# Patient Record
Sex: FEMALE | Race: WHITE | Employment: UNEMPLOYED | URBAN - METROPOLITAN AREA
[De-identification: names, ages, dates, MRNs, and addresses within clinical notes are randomized per-mention and may not be internally consistent; named-entity substitution may affect disease eponyms.]

---

## 2016-10-18 LAB — ANTIBODY: NEGATIVE

## 2018-03-12 ENCOUNTER — OFFICE VISIT (OUTPATIENT)
Dept: OBGYN CLINIC | Age: 48
End: 2018-03-12
Payer: COMMERCIAL

## 2018-03-12 VITALS
SYSTOLIC BLOOD PRESSURE: 120 MMHG | BODY MASS INDEX: 23.66 KG/M2 | WEIGHT: 142 LBS | HEIGHT: 65 IN | DIASTOLIC BLOOD PRESSURE: 74 MMHG

## 2018-03-12 DIAGNOSIS — Z12.39 SCREENING FOR BREAST CANCER: ICD-10-CM

## 2018-03-12 DIAGNOSIS — Z01.419 VISIT FOR GYNECOLOGIC EXAMINATION: ICD-10-CM

## 2018-03-12 DIAGNOSIS — Z01.419 VISIT FOR GYNECOLOGIC EXAMINATION: Primary | ICD-10-CM

## 2018-03-12 LAB
ESTRADIOL LEVEL: 85 PG/ML
FOLLICLE STIMULATING HORMONE: 12.5 MIU/ML
T4 FREE: 1.16 NG/DL (ref 0.93–1.7)
TSH SERPL DL<=0.05 MIU/L-ACNC: 0.13 UIU/ML (ref 0.27–4.2)

## 2018-03-12 PROCEDURE — 99396 PREV VISIT EST AGE 40-64: CPT | Performed by: OBSTETRICS & GYNECOLOGY

## 2018-03-12 RX ORDER — ALPRAZOLAM 0.25 MG/1
TABLET ORAL
Refills: 0 | COMMUNITY
Start: 2018-01-20 | End: 2019-04-12 | Stop reason: ALTCHOICE

## 2018-03-12 RX ORDER — AMLODIPINE BESYLATE 5 MG/1
TABLET ORAL
Refills: 5 | COMMUNITY
Start: 2018-02-12 | End: 2018-11-05 | Stop reason: SDUPTHER

## 2018-03-12 ASSESSMENT — PATIENT HEALTH QUESTIONNAIRE - PHQ9
SUM OF ALL RESPONSES TO PHQ9 QUESTIONS 1 & 2: 0
2. FEELING DOWN, DEPRESSED OR HOPELESS: 0
1. LITTLE INTEREST OR PLEASURE IN DOING THINGS: 0
SUM OF ALL RESPONSES TO PHQ QUESTIONS 1-9: 0

## 2018-03-12 ASSESSMENT — ENCOUNTER SYMPTOMS
ABDOMINAL PAIN: 0
BLOOD IN STOOL: 0
SORE THROAT: 0
SHORTNESS OF BREATH: 0
VOMITING: 0
DIARRHEA: 0
CONSTIPATION: 0
ABDOMINAL DISTENTION: 0
NAUSEA: 0
COUGH: 0
WHEEZING: 0

## 2018-03-12 NOTE — PROGRESS NOTES
Subjective:      Isidro Bishop is a 46 y.o. female  here for routine exam.  Current Complaints: normal menses, no abnormal bleeding, pelvic pain or discharge, no breast pain or new or enlarging lumps on self exam, she complains of some recent bleeding irregularities however nothing consistent. Menstrual history:   regular menses 30 days  Sexual activity:  yes, denies knowledge of risky exposure  Abnormal vaginal discharge:  No  Contraceptive method:  tubal ligation    Vitals:  /74 (Site: Right Arm, Position: Sitting, Cuff Size: Large Adult)   Ht 5' 5\" (1.651 m)   Wt 142 lb (64.4 kg)   LMP 2018 (Approximate)   BMI 23.63 kg/m²   Allergies:  Amoxicillin; Nsaids; and Pcn [penicillins]  Past Medical History:   Diagnosis Date    Stroke ([facility] [address] 2016    Thyroid disease      Past Surgical History:   Procedure Laterality Date    BLADDER SURGERY      DILATION AND CURETTAGE      ENDOMETRIAL ABLATION      TONSILLECTOMY       History reviewed. No pertinent family history. Social History     Social History    Marital status:      Spouse name: N/A    Number of children: N/A    Years of education: N/A     Occupational History    Not on file. Social History Main Topics    Smoking status: Former Smoker     Types: Cigarettes    Smokeless tobacco: Never Used    Alcohol use Yes      Comment: ocassionally    Drug use: No    Sexual activity: Not Currently     Other Topics Concern    Not on file     Social History Narrative    No narrative on file       Gynecologic History  Patient's last menstrual period was 2018 (approximate). Last Pap:  Results: normal  Last Mammogram: Yes Results: normal  no fmhx cancer  OB History      Para Term  AB Living    2 2       2    SAB TAB Ectopic Molar Multiple Live Births                       Patient's medications, allergies, past medical, surgical, social and family histories were reviewed and updated as appropriate. Review of Systems  Review of Systems   Constitutional: Negative for activity change, appetite change, fatigue and unexpected weight change. HENT: Negative for nosebleeds and sore throat. Eyes: Negative for visual disturbance. Respiratory: Negative for cough, shortness of breath and wheezing. Cardiovascular: Negative for chest pain, palpitations and leg swelling. Gastrointestinal: Negative for abdominal distention, abdominal pain, blood in stool, constipation, diarrhea, nausea and vomiting. Endocrine: Negative for cold intolerance, heat intolerance, polydipsia and polyuria. Genitourinary: Negative for difficulty urinating, dyspareunia, dysuria, frequency, genital sores, hematuria, menstrual problem, pelvic pain, urgency, vaginal bleeding, vaginal discharge and vaginal pain. Recent slight irregularity with prolonged cycle interval   Musculoskeletal: Negative for arthralgias. Skin: Negative for rash. Neurological: Negative for dizziness, weakness, light-headedness and headaches. Hematological: Negative for adenopathy. Does not bruise/bleed easily. Psychiatric/Behavioral: Negative for confusion and sleep disturbance. Objective:     Vitals:  /74 (Site: Right Arm, Position: Sitting, Cuff Size: Large Adult)   Ht 5' 5\" (1.651 m)   Wt 142 lb (64.4 kg)   LMP 01/16/2018 (Approximate)   BMI 23.63 kg/m²     Physical Exam   Constitutional: She is oriented to person, place, and time. She appears well-developed and well-nourished. No distress. HENT:   Head: Normocephalic. Right Ear: External ear normal.   Left Ear: External ear normal.   Nose: Nose normal.   Eyes: Conjunctivae and EOM are normal. Pupils are equal, round, and reactive to light. Neck: No tracheal deviation present. No thyromegaly present. Cardiovascular: Normal rate, regular rhythm, normal heart sounds and intact distal pulses. Exam reveals no gallop and no friction rub. No murmur heard.   Pulmonary/Chest: Effort normal and breath sounds normal. No respiratory distress. She has no wheezes. She has no rales. She exhibits no tenderness. Right breast exhibits no inverted nipple, no mass, no nipple discharge, no skin change and no tenderness. Left breast exhibits no inverted nipple, no mass, no nipple discharge, no skin change and no tenderness. Abdominal: Soft. Bowel sounds are normal. She exhibits no distension and no mass. There is no tenderness. There is no rebound and no guarding. Genitourinary: Vagina normal and uterus normal. No breast swelling, tenderness, discharge or bleeding. There is no rash, tenderness or lesion on the right labia. There is no rash, tenderness or lesion on the left labia. Uterus is not deviated, not enlarged, not fixed and not tender. Cervix exhibits no motion tenderness, no discharge and no friability. Right adnexum displays no mass, no tenderness and no fullness. Left adnexum displays no mass, no tenderness and no fullness. No erythema, tenderness or bleeding in the vagina. No vaginal discharge found. Genitourinary Comments: Uterus is not prolapsed and there is not a cystocele or rectocele noted   Musculoskeletal: She exhibits no edema. Lymphadenopathy:        Right: No inguinal adenopathy present. Left: No inguinal adenopathy present. Neurological: She is alert and oriented to person, place, and time. She displays normal reflexes. No cranial nerve deficit. Skin: Skin is warm and dry. She is not diaphoretic. Psychiatric: She has a normal mood and affect. Her behavior is normal. Judgment normal.       Assessment:     1. Visit for gynecologic examination  Candida DNA probe    Bacterial Vaginosis Panel    PAP SMEAR    Follicle Stimulating Hormone    Estradiol    TSH without Reflex    T4, Free   2. Screening for breast cancer  [facility] DIGITAL SCREEN W OR WO CAD BILATERAL       Body mass index is 23.63 kg/m².   Obesity:  Normal weight        Plan:   Pap smear : indicated:

## 2018-03-14 LAB — PAP SMEAR: NORMAL

## 2018-05-26 ENCOUNTER — HOSPITAL ENCOUNTER (OUTPATIENT)
Dept: WOMENS IMAGING | Age: 48
Discharge: HOME | End: 2018-05-28
Payer: COMMERCIAL

## 2018-05-26 DIAGNOSIS — Z12.39 SCREENING FOR BREAST CANCER: ICD-10-CM

## 2018-05-26 PROCEDURE — 77067 SCR MAMMO BI INCL CAD: CPT

## 2018-07-05 ENCOUNTER — TELEPHONE (OUTPATIENT)
Dept: FAMILY MEDICINE CLINIC | Age: 48
End: 2018-07-05

## 2018-07-05 ENCOUNTER — OFFICE VISIT (OUTPATIENT)
Dept: FAMILY MEDICINE CLINIC | Age: 48
End: 2018-07-05
Payer: COMMERCIAL

## 2018-07-05 VITALS
HEIGHT: 65 IN | RESPIRATION RATE: 18 BRPM | OXYGEN SATURATION: 97 % | SYSTOLIC BLOOD PRESSURE: 124 MMHG | BODY MASS INDEX: 24.16 KG/M2 | TEMPERATURE: 98.4 F | WEIGHT: 145 LBS | HEART RATE: 78 BPM | DIASTOLIC BLOOD PRESSURE: 74 MMHG

## 2018-07-05 DIAGNOSIS — I10 ESSENTIAL HYPERTENSION: ICD-10-CM

## 2018-07-05 DIAGNOSIS — F32.A DEPRESSION, UNSPECIFIED DEPRESSION TYPE: Primary | ICD-10-CM

## 2018-07-05 DIAGNOSIS — F32.A DEPRESSION, UNSPECIFIED DEPRESSION TYPE: ICD-10-CM

## 2018-07-05 DIAGNOSIS — Z86.73 HISTORY OF STROKE: ICD-10-CM

## 2018-07-05 DIAGNOSIS — E03.9 HYPOTHYROIDISM, UNSPECIFIED TYPE: ICD-10-CM

## 2018-07-05 LAB
ALBUMIN SERPL-MCNC: 4.9 G/DL (ref 3.9–4.9)
ALP BLD-CCNC: 60 U/L (ref 40–130)
ALT SERPL-CCNC: 19 U/L (ref 0–33)
ANION GAP SERPL CALCULATED.3IONS-SCNC: 15 MEQ/L (ref 7–13)
AST SERPL-CCNC: 20 U/L (ref 0–35)
BASOPHILS ABSOLUTE: 0.1 K/UL (ref 0–0.2)
BASOPHILS RELATIVE PERCENT: 0.6 %
BILIRUB SERPL-MCNC: <0.2 MG/DL (ref 0–1.2)
BUN BLDV-MCNC: 14 MG/DL (ref 6–20)
CALCIUM SERPL-MCNC: 10.3 MG/DL (ref 8.6–10.2)
CHLORIDE BLD-SCNC: 99 MEQ/L (ref 98–107)
CO2: 27 MEQ/L (ref 22–29)
CREAT SERPL-MCNC: 0.73 MG/DL (ref 0.5–0.9)
EOSINOPHILS ABSOLUTE: 0 K/UL (ref 0–0.7)
EOSINOPHILS RELATIVE PERCENT: 0.5 %
GFR AFRICAN AMERICAN: >60
GFR NON-AFRICAN AMERICAN: >60
GLOBULIN: 2.9 G/DL (ref 2.3–3.5)
GLUCOSE BLD-MCNC: 93 MG/DL (ref 74–109)
HCT VFR BLD CALC: 43.3 % (ref 37–47)
HEMOGLOBIN: 14.9 G/DL (ref 12–16)
LYMPHOCYTES ABSOLUTE: 2.1 K/UL (ref 1–4.8)
LYMPHOCYTES RELATIVE PERCENT: 24 %
MCH RBC QN AUTO: 32.2 PG (ref 27–31.3)
MCHC RBC AUTO-ENTMCNC: 34.4 % (ref 33–37)
MCV RBC AUTO: 93.8 FL (ref 82–100)
MONOCYTES ABSOLUTE: 0.5 K/UL (ref 0.2–0.8)
MONOCYTES RELATIVE PERCENT: 6.1 %
NEUTROPHILS ABSOLUTE: 5.9 K/UL (ref 1.4–6.5)
NEUTROPHILS RELATIVE PERCENT: 68.8 %
PDW BLD-RTO: 12.8 % (ref 11.5–14.5)
PLATELET # BLD: 268 K/UL (ref 130–400)
POTASSIUM SERPL-SCNC: 4.4 MEQ/L (ref 3.5–5.1)
RBC # BLD: 4.62 M/UL (ref 4.2–5.4)
SODIUM BLD-SCNC: 141 MEQ/L (ref 132–144)
T4 FREE: 1.42 NG/DL (ref 0.93–1.7)
TOTAL PROTEIN: 7.8 G/DL (ref 6.4–8.1)
TSH REFLEX: 1.28 UIU/ML (ref 0.27–4.2)
WBC # BLD: 8.6 K/UL (ref 4.8–10.8)

## 2018-07-05 PROCEDURE — 99204 OFFICE O/P NEW MOD 45 MIN: CPT | Performed by: INTERNAL MEDICINE

## 2018-07-05 RX ORDER — BUSPIRONE HYDROCHLORIDE 7.5 MG/1
7.5 TABLET ORAL 2 TIMES DAILY
Qty: 60 TABLET | Refills: 0 | Status: SHIPPED | OUTPATIENT
Start: 2018-07-05 | End: 2018-10-15

## 2018-07-05 RX ORDER — UBIDECARENONE 75 MG
1 CAPSULE ORAL
COMMUNITY
End: 2019-06-05 | Stop reason: CLARIF

## 2018-07-05 RX ORDER — ATORVASTATIN CALCIUM 40 MG/1
40 TABLET, FILM COATED ORAL DAILY
Qty: 90 TABLET | Refills: 0 | Status: SHIPPED | OUTPATIENT
Start: 2018-07-05 | End: 2018-08-07 | Stop reason: DRUGHIGH

## 2018-07-05 RX ORDER — LEVOTHYROXINE SODIUM 0.1 MG/1
100 TABLET ORAL
COMMUNITY
Start: 2018-03-29 | End: 2018-09-25

## 2018-07-05 ASSESSMENT — ENCOUNTER SYMPTOMS
ABDOMINAL PAIN: 0
BACK PAIN: 0
EYE PAIN: 0
SHORTNESS OF BREATH: 0

## 2018-07-05 NOTE — TELEPHONE ENCOUNTER
Patient states she was see today and there was a cholesterol  Script sent to the pharmacy but was not discussed at appt.  She wants to know why it was sent to the pharmacy

## 2018-07-05 NOTE — PROGRESS NOTES
Subjective:      Patient ID: Carlos Puga is a 46 y.o. female who presents today with:  Chief Complaint   Patient presents with    Depression     went off meds and feels that she is ready to start back on them    Menstrual Problem     has not had a cycle in 3months; has been for a check up with [facility]       HPI    Depression-Chronic issue. Associated with Anxiety. She has been on multiple SSRI' in the past. Wellbutrin made her more anxious. She has been off of it     Menstrual problems-She believes is related to her lola-menopausal symptoms. Hypertension-Chronic, essential in nature, improved with amlodipine. Past Medical History:   Diagnosis Date    Depression     Stroke ([facility] [address] 05/2016    Idiopathic she is still in process     Thyroid disease      Past Surgical History:   Procedure Laterality Date    BLADDER SURGERY      DILATION AND CURETTAGE      ENDOMETRIAL ABLATION      TONSILLECTOMY       Social History     Social History    Marital status:      Spouse name: N/A    Number of children: N/A    Years of education: N/A     Occupational History    Not on file. Social History Main Topics    Smoking status: Former Smoker     Types: Cigarettes     Start date: 7/5/1988     Quit date: 7/5/1990    Smokeless tobacco: Never Used    Alcohol use Yes      Comment: ocassionally    Drug use: No    Sexual activity: Not Currently     Other Topics Concern    Not on file     Social History Narrative    No narrative on file     Allergies   Allergen Reactions    Amoxicillin Hives    Nsaids Other (See Comments)     STROKE/NO NSAIDS ALLOWED    Pcn [Penicillins] Rash     Current Outpatient Prescriptions on File Prior to Visit   Medication Sig Dispense Refill    ALPRAZolam (XANAX) 0.25 MG tablet TAKE 1 TABLET BY MOUTH AS NEEDED for up to 90 days.   0    amLODIPine (NORVASC) 5 MG tablet TAKE 1 TABLET BY MOUTH ONCE DAILY  5    Multiple Vitamin (MVI, CELEBRATE, CHEWABLE TABLET) Take 1 tablet by mouth      aspirin 81 MG tablet Take 81 mg by mouth 2 times daily      Glucosamine-Chondroit-Vit C-Mn (GLUCOSAMINE 1500 COMPLEX PO) Take 1 tablet by mouth daily      fluticasone (FLONASE) 50 MCG/ACT nasal spray 2 sprays       No current facility-administered medications on file prior to visit. History reviewed. No pertinent family history. I have personally reviewed the ROS, PMH, PFH, and social history     Review of Systems   Constitutional: Negative for chills and fever. HENT: Negative for congestion. Eyes: Negative for pain. Respiratory: Negative for shortness of breath. Cardiovascular: Negative for chest pain. Gastrointestinal: Negative for abdominal pain. Genitourinary: Negative for hematuria. Musculoskeletal: Negative for back pain. Allergic/Immunologic: Negative for immunocompromised state. Neurological: Negative for headaches. Psychiatric/Behavioral: Negative for hallucinations. +depression  +Anxiety        Objective:   /74 (Site: Left Arm, Position: Sitting, Cuff Size: Large Adult)   Pulse 78   Temp 98.4 °F (36.9 °C) (Tympanic)   Resp 18   Ht 5' 4.76\" (1.645 m)   Wt 145 lb (65.8 kg)   LMP 04/05/2018 (Approximate) Comment: Has been 3months since last cycle  SpO2 97%   BMI 24.31 kg/m²     Physical Exam   Constitutional: She is oriented to person, place, and time. She appears well-developed and well-nourished. HENT:   Head: Normocephalic. Eyes: Pupils are equal, round, and reactive to light. Neck: No tracheal deviation present. Cardiovascular: Normal rate, regular rhythm and normal heart sounds. Exam reveals no gallop and no friction rub. No murmur heard. Pulmonary/Chest: No respiratory distress. Abdominal: Soft. Bowel sounds are normal. She exhibits no distension. There is no rebound. Musculoskeletal: She exhibits no edema. Neurological: She is oriented to person, place, and time. Skin: Skin is warm and dry.

## 2018-07-09 ENCOUNTER — TELEPHONE (OUTPATIENT)
Dept: FAMILY MEDICINE CLINIC | Age: 48
End: 2018-07-09

## 2018-07-09 NOTE — TELEPHONE ENCOUNTER
Please restart with just zoloft. The combination of the two may have been too much. If still having diarrhea I will switch.

## 2018-08-06 ENCOUNTER — OFFICE VISIT (OUTPATIENT)
Dept: FAMILY MEDICINE CLINIC | Age: 48
End: 2018-08-06
Payer: COMMERCIAL

## 2018-08-06 VITALS
TEMPERATURE: 98.4 F | SYSTOLIC BLOOD PRESSURE: 110 MMHG | OXYGEN SATURATION: 98 % | RESPIRATION RATE: 16 BRPM | HEART RATE: 82 BPM | WEIGHT: 144 LBS | BODY MASS INDEX: 23.99 KG/M2 | HEIGHT: 65 IN | DIASTOLIC BLOOD PRESSURE: 62 MMHG

## 2018-08-06 DIAGNOSIS — L91.8 SKIN TAG: ICD-10-CM

## 2018-08-06 DIAGNOSIS — Z86.73 HISTORY OF STROKE: ICD-10-CM

## 2018-08-06 DIAGNOSIS — F32.A DEPRESSION, UNSPECIFIED DEPRESSION TYPE: Primary | ICD-10-CM

## 2018-08-06 DIAGNOSIS — I10 ESSENTIAL HYPERTENSION: ICD-10-CM

## 2018-08-06 DIAGNOSIS — F41.9 ANXIETY: ICD-10-CM

## 2018-08-06 LAB
CHOLESTEROL, TOTAL: 244 MG/DL (ref 0–199)
HDLC SERPL-MCNC: 108 MG/DL (ref 40–59)
LDL CHOLESTEROL CALCULATED: 91 MG/DL (ref 0–129)
TRIGL SERPL-MCNC: 225 MG/DL (ref 0–200)

## 2018-08-06 PROCEDURE — 99214 OFFICE O/P EST MOD 30 MIN: CPT | Performed by: INTERNAL MEDICINE

## 2018-08-06 ASSESSMENT — ENCOUNTER SYMPTOMS
EYE PAIN: 0
SHORTNESS OF BREATH: 0
BACK PAIN: 0
ABDOMINAL PAIN: 0

## 2018-08-06 NOTE — PATIENT INSTRUCTIONS
Patient Education        Depression Treatment: Care Instructions  Your Care Instructions    Depression is a condition that affects the way you feel, think, and act. It causes symptoms such as low energy, loss of interest in daily activities, and sadness or grouchiness that goes on for a long time. Depression is very common and affects men and women of all ages. Depression is a medical illness caused by changes in the natural chemicals in your brain. It is not a character flaw, and it does not mean that you are a bad or weak person. It does not mean that you are going crazy. It is important to know that depression can be treated. Medicines, counseling, and self-care can all help. Many people do not get help because they are embarrassed or think that they will get over the depression on their own. But some people do not get better without treatment. Follow-up care is a key part of your treatment and safety. Be sure to make and go to all appointments, and call your doctor if you are having problems. It's also a good idea to know your test results and keep a list of the medicines you take. How can you care for yourself at home? Learn about antidepressant medicines  Antidepressant medicines can improve or end the symptoms of depression. You may need to take the medicine for at least 6 months, and often longer. Keep taking your medicine even if you feel better. If you stop taking it too soon, your symptoms may come back or get worse. You may start to feel better within 1 to 3 weeks of taking antidepressant medicine. But it can take as many as 6 to 8 weeks to see more improvement. Talk to your doctor if you have problems with your medicine or if you do not notice any improvement after 3 weeks. Antidepressants can make you feel tired, dizzy, or nervous. Some people have dry mouth, constipation, headaches, sexual problems, an upset stomach, or diarrhea.  Many of these side effects are mild and go away on their own \"I am hopeful\"; \"Things will get better\"; and \"I can ask for the help I need. \" Write down these statements and read them often, even if you don't believe them yet. · Be patient with yourself. It took time for your depression to develop, and it will take time for your symptoms to improve. Do not take on too much or be too hard on yourself. · Learn all you can about depression from written and online materials. · Check out behavioral health classes to learn more about dealing with depression. · Keep the numbers for these national suicide hotlines: 6-247-286-TALK (5-671.665.5855) and 2-228-ZATINOG (5-911.292.8851). If you or someone you know talks about suicide or feeling hopeless, get help right away. When should you call for help? Call 911 anytime you think you may need emergency care. For example, call if:    · You feel you cannot stop from hurting yourself or someone else.   [facility] your doctor now or seek immediate medical care if:    · You hear voices.     · You feel much more depressed.    Watch closely for changes in your health, and be sure to contact your doctor if:    · You are having problems with your depression medicine.     · You are not getting better as expected. Where can you learn more? Go to https://Coraidpebambieb.[employer]. org and sign in to your [employer] account. Enter E634 in the [employer] box to learn more about \"Depression Treatment: Care Instructions. \"     If you do not have an account, please click on the \"Sign Up Now\" link. Current as of: December 7, 2017  Content Version: 11.6  © 1247-4141 "[employer] Incorporated. Care instructions adapted under license by [facility] ([facility]). If you have questions about a medical condition or this instruction, always ask your healthcare professional. [address] any warranty or liability for your use of this information.

## 2018-08-07 DIAGNOSIS — Z86.73 HISTORY OF STROKE: Primary | ICD-10-CM

## 2018-08-07 RX ORDER — ATORVASTATIN CALCIUM 10 MG/1
10 TABLET, FILM COATED ORAL DAILY
Qty: 30 TABLET | Refills: 2 | Status: SHIPPED | OUTPATIENT
Start: 2018-08-07 | End: 2018-10-15

## 2018-08-13 ENCOUNTER — OFFICE VISIT (OUTPATIENT)
Dept: BEHAVIORAL/MENTAL HEALTH CLINIC | Age: 48
End: 2018-08-13
Payer: COMMERCIAL

## 2018-08-13 DIAGNOSIS — F33.1 MODERATE RECURRENT MAJOR DEPRESSION (HCC): Primary | ICD-10-CM

## 2018-08-13 PROCEDURE — 90791 PSYCH DIAGNOSTIC EVALUATION: CPT | Performed by: PSYCHOLOGIST

## 2018-08-13 ASSESSMENT — PATIENT HEALTH QUESTIONNAIRE - PHQ9
7. TROUBLE CONCENTRATING ON THINGS, SUCH AS READING THE NEWSPAPER OR WATCHING TELEVISION: 1
9. THOUGHTS THAT YOU WOULD BE BETTER OFF DEAD, OR OF HURTING YOURSELF: 0
5. POOR APPETITE OR OVEREATING: 2
SUM OF ALL RESPONSES TO PHQ9 QUESTIONS 1 & 2: 4
3. TROUBLE FALLING OR STAYING ASLEEP: 3
2. FEELING DOWN, DEPRESSED OR HOPELESS: 2
4. FEELING TIRED OR HAVING LITTLE ENERGY: 3
SUM OF ALL RESPONSES TO PHQ QUESTIONS 1-9: 14
6. FEELING BAD ABOUT YOURSELF - OR THAT YOU ARE A FAILURE OR HAVE LET YOURSELF OR YOUR FAMILY DOWN: 1
10. IF YOU CHECKED OFF ANY PROBLEMS, HOW DIFFICULT HAVE THESE PROBLEMS MADE IT FOR YOU TO DO YOUR WORK, TAKE CARE OF THINGS AT HOME, OR GET ALONG WITH OTHER PEOPLE: 1
SUM OF ALL RESPONSES TO PHQ QUESTIONS 1-9: 14
1. LITTLE INTEREST OR PLEASURE IN DOING THINGS: 2
8. MOVING OR SPEAKING SO SLOWLY THAT OTHER PEOPLE COULD HAVE NOTICED. OR THE OPPOSITE, BEING SO FIGETY OR RESTLESS THAT YOU HAVE BEEN MOVING AROUND A LOT MORE THAN USUAL: 0

## 2018-08-13 NOTE — PROGRESS NOTES
Behavioral Health Consultation  Betsey Sanchez, Ph.D., [facility]  Psychologist  8/13/18  11:27 AM      Time spent with Patient: 30 minutes  This is patient's first  [facility] appointment. Reason for Consult:  depression, anxiety, stress and relationship conflict  Referring Provider: Jayla Bone MD    Pt provided informed consent for the behavioral health program. Discussed with patient model of service to include the limits of confidentiality (i.e. abuse reporting, suicide intervention, etc.) and short-term intervention focused approach. Pt indicated understanding. Feedback given to PCP. S:   Pt reports a history of MH treatment though counseling  And has been on antidepressants most of her adult life through PCPs. Pt states she last had counseling at [facility] in [place] but left to do couples counseling and several providers have left the practice. Pt was just established with PCP and referred for [facility] . Pt notes she just started Zoloft and had terrible side effects, was switched to trintellix hoping she might have some relief. Since starting it she has had an improvement in the diarrhea but not relief. Pt lives alone and is in a relationship \"and that's a big reason I'm here\". Pt notes she has been dating someone for about 14 months and things were great at first. He started back on his antidepressant and experienced sexual side effects and \"ever since then the wall has gone up\". Pt's boyfriend has since stopped medication and hasn't restarted another antidepressant, had been taking steroids at that time. He is \"miserable\". \"honestly everything is telling me to end the relationship\" but doesn't know why she doesn't. Pt is from [place], has extended family  Close, has two daughters. 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 Pt  Used to exercise but had a stroke and other medical concerns \"so I backed off\". Pt notes fatigue.  Pt has good functional assessment, [place]-setting to identify pt's primary goals for [facility] visit / overall health, Supportive techniques, Provided Psychoeducation re: genesight testing, anxiety, depression and stress management, Explained relaxed breathing technique in detail and practiced this with pt in visit, Emphasized importance of regular practice of relaxation strategies to target / promote symptom relief and Provided pt list of websites and several smartphone helen resources for further practicing guided meditations and breathing exercises. Pt Behavioral Change Plan:    On Wednesday the Trintillex dose moves to 10mg on Friday add the Lipitor. Review the options with the Genesight testing    1. Dedicate 5 minutes 3x/day to practice the deep breathing    2. Review the list of apps and give some a try! [facility] Box, CBTi  and progressive muscle relaxation for sleep, etc.)    3. Read the below information about stress management    4. Return in about 2-3 weeks    Please note this report has been partially produced using speech recognition software  And may cause contain errors related to that system including grammar, punctuation and spelling as well as words and phrases that may seem inappropriate. If there are questions or concerns please feel free to contact me to clarify.

## 2018-08-13 NOTE — PATIENT INSTRUCTIONS
On Wednesday the Trintillex dose moves to 10mg on Friday add the Lipitor. Review the options with the Genesight testing    1. Dedicate 5 minutes 3x/day to practice the deep breathing    2. Review the list of apps and give some a try! [facility] Box, CBTi  and progressive muscle relaxation for sleep, etc.)    3. Read the below information about stress management    4. Return in about 2-3 weeks    \"The entire autonomic nervous system (and through it, our internal organs and glands) is largely driven by our breathing patterns. By changing our breathing we can influence millions of biochemical reactions in our body, producing more relaxing substances such as endorphins and fewer anxiety-producing ones like adrenaline and higher blood acidity. Mindfulness of the breath is so effective that it is common to all meditative and prayer traditions. \" Anxiety Fear & Breathing - Breathing. com    \"When overcoming high levels of anxiety, it is important to learn the techniques of correct breathing. Many people who live with high levels of anxiety are known to breathe through their chest. Shallow breathing through the chest means you are disrupting the balance of oxygen and carbon dioxide necessary to be in a relaxed state. This type of breathing will perpetuate the symptoms of anxiety. \" Yhat. com      Diaphragmatic Breathing             _____________________________________________________________________________  1. Sit in a comfortable position    2. Place one hand on your stomach and the other on your chest    3. Try to breathe so that only your stomach rises and falls    As you inhale, concentrate on your chest remaining relatively still while your stomach rises. It may be helpful for you to imagine that your pants are too big and you need to push your stomach out to hold them up. When exhaling, allow your stomach to fall in and the air to fully escape. Inhale slowly.   You may choose to hold the air breathing, muscle relaxation, and guided imagery/visualization to cope with stress, pain, anxiety and depression. I recommend to all my patients that they try different techniques to find the ones that work best for them. Below are 2 websites that have several breathing, relaxation, and visualization exercises that you can listen to and download for free.    NetworkZumba Fitnessair.tn. html  · Deep Breathing & Guided Relaxation Exercises (3)  · Guided Imagery/Visualization Exercises (5)  · Mindfulness & Meditation Exercises (3)  · Progressive Muscle Relaxation   · Soothing Instrumental Music (11)    http://[employer]/relax/  · Diaphragmatic Breathing   · Deep Breathing I   · Deep Breathing II   · Progressive Muscle Relaxation   · Guided Imagery: The SafetyCulture   · Guided Imagery: [facility]   · Relaxing Phrases   · Just This Breath   · Increasing Awareness   · Sending Thoughts Away on Clouds  · Sending Thoughts Away on Leaves  · Sorting Into Boxes     -----------------------------------------------------  Below are several apps that you can download to your smartphone to help with relaxation and mood coping. Ghbcpgq9Ijlol  Platform: StatAce  Cost: Free  Your breathing has a profound effect on your body. Dave Garcia know this fact to be true if youve ever taken deep breaths to calm yourself down when you were upset. That exercise can often make you feel more centered, and its proof that breathing is powerful. The Pyhxetz6Riypw helen uses guided breathing exercises to help reduce symptoms of an anxiety attack. If an attack is coming or the symptoms are unbearable, slip away into a quiet room, open your helen, and let the worry and stress slip away with each breath. Universal Breathing - Pranayama Free  Platform: StatAce  Cost: Free  Focused breathing exercises can help you regain composure during an anxiety attack.  They can also help you prevent an anxiety attack before videos that contain instructions for relaxing and clearing the mind. The videos are created by Dr. Maude Aguilar, a psychologist with more than 20 years of experience. In addition to viewing Dr. Fabian Gill videos, you can read a variety of articles related to anxiety, meditation, and stress management. Anxiety Free  Platform: Wagon   Cost: Free  Meditation requires mindfulness and a sense of presence in your thoughts. Hypnosis is one step beyond meditation. It works by sending signals to your brain and transforming it almost unknowingly. The creators of this helen say that its audio recordings contain subliminal signals that speak to the subconscious with powerful effect.  Hypnosis, like meditation, requires practice, and the goal is to get each user to a place where self-hypnosis is possible in order to reduce anxiety. Relax & Rest Guided Meditations  Platform: iPhone and Android  Cost: $0.99  While group meetings and discussions are always an option, some people find relaxation more easily on their own. This helen lets you relax in the space of your own home or office with three guided meditations. Breath Awareness Guided Meditation (5 minutes), Deep Rested Guided Meditation (13 minutes), and Whole Body Guided Relaxation (24 minutes) are designed specifically to help you relax and sink into a peaceful meditation moment. Equanimity - Meditation Timer & Tracker  Platform: Wagon   Cost: $4.99  Meditation is one way you can cope with the symptoms and side effects of anxiety. People who meditate can eventually train themselves to stay calm when feeling stressed or anxious. Equanimity - Meditation Timer & Tracker is simple and straightforward. Time each session and watch for visual light cues to let you know how long youve been meditating. Take notes about each session, and watch your progress as you learn to manage your stress and anxiety.   Virtual Hope Box  Platform: Wagon and Fannect  Cost: Mary Jane Escalera

## 2018-08-13 NOTE — Clinical Note
Pt would like to complete the 99dressesight testing if you would agree to order it. If so, can you please have the office reach out to her so she can come in for the swab?

## 2018-08-15 ENCOUNTER — TELEPHONE (OUTPATIENT)
Dept: FAMILY MEDICINE CLINIC | Age: 48
End: 2018-08-15

## 2018-08-15 NOTE — TELEPHONE ENCOUNTER
lmom x2 days to come in for genesight testing. No appt necessary, can come between 8:30-4:00 and have this done.

## 2018-08-20 ENCOUNTER — TELEPHONE (OUTPATIENT)
Dept: FAMILY MEDICINE CLINIC | Age: 48
End: 2018-08-20

## 2018-08-27 ENCOUNTER — TELEPHONE (OUTPATIENT)
Dept: FAMILY MEDICINE CLINIC | Age: 48
End: 2018-08-27

## 2018-08-27 NOTE — TELEPHONE ENCOUNTER
resubmitted pa for trintellix since pt has already tried 2 of the medications insurance wanted her to please advise pt when she calls back lmom

## 2018-08-31 NOTE — TELEPHONE ENCOUNTER
Left message for patient to call back.  I would suggest cymbalta, but I want to verify with her that she hasn't failed that one already

## 2018-09-05 NOTE — TELEPHONE ENCOUNTER
Patient called back to see status of PA for Trintellix. Has it been approved or are we still waiting?

## 2018-09-06 ENCOUNTER — TELEPHONE (OUTPATIENT)
Dept: FAMILY MEDICINE CLINIC | Age: 48
End: 2018-09-06

## 2018-09-06 NOTE — TELEPHONE ENCOUNTER
Patient is scheduled with you on 9/17/18 at 1030 for 15 minutes. It is a 6 wk f/u. Asking if you can remove skin tags during this appointment. Please advise.

## 2018-09-10 ENCOUNTER — TELEPHONE (OUTPATIENT)
Dept: FAMILY MEDICINE CLINIC | Age: 48
End: 2018-09-10

## 2018-09-17 ENCOUNTER — OFFICE VISIT (OUTPATIENT)
Dept: FAMILY MEDICINE CLINIC | Age: 48
End: 2018-09-17
Payer: COMMERCIAL

## 2018-09-17 VITALS
OXYGEN SATURATION: 96 % | HEART RATE: 76 BPM | HEIGHT: 65 IN | DIASTOLIC BLOOD PRESSURE: 60 MMHG | SYSTOLIC BLOOD PRESSURE: 114 MMHG | RESPIRATION RATE: 16 BRPM | BODY MASS INDEX: 23.96 KG/M2 | TEMPERATURE: 97.4 F

## 2018-09-17 DIAGNOSIS — E78.5 HYPERLIPIDEMIA, UNSPECIFIED HYPERLIPIDEMIA TYPE: ICD-10-CM

## 2018-09-17 DIAGNOSIS — F32.A DEPRESSION, UNSPECIFIED DEPRESSION TYPE: ICD-10-CM

## 2018-09-17 DIAGNOSIS — L98.9 SKIN LESION: ICD-10-CM

## 2018-09-17 DIAGNOSIS — L91.8 SKIN TAGS, MULTIPLE ACQUIRED: ICD-10-CM

## 2018-09-17 DIAGNOSIS — I10 ESSENTIAL HYPERTENSION: ICD-10-CM

## 2018-09-17 DIAGNOSIS — G47.09 OTHER INSOMNIA: ICD-10-CM

## 2018-09-17 DIAGNOSIS — L91.8 SKIN TAGS, MULTIPLE ACQUIRED: Primary | ICD-10-CM

## 2018-09-17 DIAGNOSIS — M21.619 BUNION: ICD-10-CM

## 2018-09-17 PROCEDURE — 99214 OFFICE O/P EST MOD 30 MIN: CPT | Performed by: INTERNAL MEDICINE

## 2018-09-17 PROCEDURE — 17110 DESTRUCTION B9 LES UP TO 14: CPT | Performed by: INTERNAL MEDICINE

## 2018-09-17 ASSESSMENT — ENCOUNTER SYMPTOMS
ABDOMINAL PAIN: 0
BACK PAIN: 0
EYE PAIN: 0
SHORTNESS OF BREATH: 0

## 2018-09-17 NOTE — PROGRESS NOTES
by mouth 2 times daily 60 tablet 0    ALPRAZolam (XANAX) 0.25 MG tablet TAKE 1 TABLET BY MOUTH AS NEEDED for up to 90 days. 0    amLODIPine (NORVASC) 5 MG tablet TAKE 1 TABLET BY MOUTH ONCE DAILY  5    Multiple Vitamin (MVI, CELEBRATE, CHEWABLE TABLET) Take 1 tablet by mouth      aspirin 81 MG tablet Take 81 mg by mouth 2 times daily      Glucosamine-Chondroit-Vit C-Mn (GLUCOSAMINE 1500 COMPLEX PO) Take 1 tablet by mouth daily      fluticasone (FLONASE) 50 MCG/ACT nasal spray 2 sprays      VORTIoxetine (TRINTELLIX) 5 MG tablet Take 1 tablet by mouth daily 14 tablet 0    sertraline (ZOLOFT) 50 MG tablet Take 1 tablet by mouth daily 30 tablet 1     No current facility-administered medications on file prior to visit. I have personally reviewed the ROS, PMH, PFH, and social history     Review of Systems   Constitutional: Negative for chills and fever. HENT: Negative for congestion. Eyes: Negative for pain. Respiratory: Negative for shortness of breath. Cardiovascular: Negative for chest pain. Gastrointestinal: Negative for abdominal pain. Genitourinary: Negative for hematuria. Musculoskeletal: Negative for back pain. Skin:        +skin tags  +lesion on left proximal thigh    Allergic/Immunologic: Negative for immunocompromised state. Neurological: Negative for headaches. Psychiatric/Behavioral: Negative for hallucinations. Objective:   /60 (Site: Left Upper Arm, Position: Sitting, Cuff Size: Large Adult)   Pulse 76   Temp 97.4 °F (36.3 °C) (Tympanic)   Resp 16   Ht 5' 5\" (1.651 m)   SpO2 96%   BMI 23.96 kg/m²     Physical Exam   Constitutional: She is oriented to person, place, and time. She appears well-developed and well-nourished. HENT:   Head: Normocephalic. Eyes: Pupils are equal, round, and reactive to light. Neck: No tracheal deviation present. Cardiovascular: Normal rate, regular rhythm and normal heart sounds.   Exam reveals no gallop and no friction rub. No murmur heard. Pulmonary/Chest: No respiratory distress. Abdominal: Soft. Bowel sounds are normal. She exhibits no distension. There is no rebound. Musculoskeletal: She exhibits no edema. Neurological: She is oriented to person, place, and time. Skin: Skin is warm and dry. Multiple flesh colored projections from axillae, back, and proximal thigh  Left whitish/flesh colored macule over left proximal thigh. Assessment:       Diagnosis Orders   1. Skin tags, multiple acquired  78742 - ID DESTRUCTION BENIGN LESIONS UP TO 14    Surgical Pathology    Surgical Pathology    Surgical Pathology    Surgical Pathology    Surgical Pathology   2. Depression, unspecified depression type     3. Essential hypertension     4. Other insomnia     5. Bunion  Referral To Podiatry - (AYESHA Barillas DPM   6. Skin lesion  Surgical Pathology    Surgical Pathology    Surgical Pathology    Surgical Pathology    Surgical Pathology   7. Hyperlipidemia, unspecified hyperlipidemia type  Lipid Panel         Plan:   R/B/A explained  Sites cleaned with alcohol and betadine  Sites anesthestized with 2% lidocaine with epi, time out taken prior to starting. Lesions removed with electrocautery. Same process followed for lesion on left proximal thigh but shave biopsy performed. Foreceps used to grab skin and 11 scalpel used to shave off section. Hemostasis achieved with electrocautery. Take 2 remfresh  Skin removal  Tj minor, she is not SI/HI  Continue Lipitor 10 mg once daily, repeat lipid panel in 3 months, likely increase to 20 mg once daily given history. Take 2 REM fresh, call if side effects occur. Referred to podiatry for suspected early bunions, referral to orthotics, etc.   Patient had one other lesion she wanted investigated for time purposes we will have to reschedule.      Orders Placed This Encounter   Procedures    Lipid Panel     Standing Status:   Future     Standing NO OR YES/SPECIMEN     Answer:   No    Referral To Podiatry - Vivian Galloway DPM ([facility])     Referral Priority:   Routine     Referral Type:   Eval and Treat     Referral Reason:   Specialty Services Required     Referred to Provider:   Charito Duarte DPM     Requested Specialty:   Podiatry     Number of Visits Requested:   1    89545 - [place] DESTRUCTION BENIGN LESIONS UP TO 14     No orders of the defined types were placed in this encounter. Return for regularly scheduled appointment with PCP, worsening symptoms, call ASAP for appointment.       Jayla Bone MD

## 2018-09-17 NOTE — PATIENT INSTRUCTIONS
Patient Education        Skin Tag Removal: Care Instructions  Your Care Instructions  Skin tags are small lumps of fleshy brown, tan, or pink skin. They are usually raised or hang from the skin on a small stalk. They often grow on the eyelids, neck, armpit, and groin. Skin tags are not moles and usually do not turn into cancer. You are more likely to get skin tags if you are overweight. They also tend to run in families. Skin tags may be removed if they bother you. Your doctor can remove an unwanted skin tag by simply cutting it off. However, new skin tags often form. Follow-up care is a key part of your treatment and safety. Be sure to make and go to all appointments, and call your doctor if you are having problems. It's also a good idea to know your test results and keep a list of the medicines you take. How can you care for yourself at home? · If clothing irritates a skin tag, cover it with a bandage to prevent rubbing and bleeding. · If you have a skin tag removed, clean the area with soap and water two times a day unless your doctor gives you different instructions. Don't use hydrogen peroxide or alcohol, which can slow healing. ¨ You may cover the wound with a thin layer of petroleum jelly, such as Vaseline, and a nonstick bandage. · Check all the skin on your body once a month for skin growths or other changes, such as color and feel of the skin. ¨  front of a full-length mirror. Look carefully at the front and back of your body. Then look at your right and left sides with your arms raised. [facility] your elbows and look carefully at your forearms, the back of your upper arms, and your palms. ¨ Look at your feet, the soles of your feet, and the spaces between your toes. ¨ Use a hand mirror to look at the back of your legs, the back of your neck, and your back, rear end (buttocks), and genital area. Part the hair on your head to look at your scalp.   · If you see a change in a skin growth, contact your doctor. Look for:  ¨ A mole that bleeds. ¨ A fast-growing mole. ¨ A scaly or crusted growth on the skin. ¨ A sore that will not heal.  When should you call for help? Call your doctor now or seek immediate medical care if:    · You have signs of infection such as:  ¨ Pain, warmth, or swelling in your skin. ¨ Red streaks near a wound in your skin. ¨ Pus coming from a wound in your skin. ¨ A fever.    Watch closely for changes in your health, and be sure to contact your doctor if:    · You have an area of normal skin that suddenly changes in shape, size, or how it looks.     · You do not get better as expected. Where can you learn more? Go to https://[employer]. org and sign in to your [employer] account. Enter [phone] in the [facility] box to learn more about \"Skin Tag Removal: Care Instructions. \"     If you do not have an account, please click on the \"Sign Up Now\" link. Current as of: October 5, 2017  Content Version: 11.7  © 5661-3673 [employer]. Care instructions adapted under license by [address] If you have questions about a medical condition or this instruction, always ask your healthcare professional. Prashanth Sumner any warranty or liability for your use of this information.

## 2018-09-24 ENCOUNTER — TELEPHONE (OUTPATIENT)
Dept: FAMILY MEDICINE CLINIC | Age: 48
End: 2018-09-24

## 2018-09-25 NOTE — TELEPHONE ENCOUNTER
Pt following up on her samples, she is asking if she can have an alternative for this medication, Viibryd as you noted below. Please advise.

## 2018-09-25 NOTE — TELEPHONE ENCOUNTER
We do not have samples. The drug rep has not contacted me back yet she was looking into her plan for me for coverage.

## 2018-09-27 ENCOUNTER — TELEPHONE (OUTPATIENT)
Dept: FAMILY MEDICINE CLINIC | Age: 48
End: 2018-09-27

## 2018-09-27 NOTE — TELEPHONE ENCOUNTER
Samples ready for pt. Patient assist paper work submitted and originals are ready for  also.  lmom informing pt

## 2018-10-15 ENCOUNTER — OFFICE VISIT (OUTPATIENT)
Dept: FAMILY MEDICINE CLINIC | Age: 48
End: 2018-10-15
Payer: COMMERCIAL

## 2018-10-15 ENCOUNTER — TELEPHONE (OUTPATIENT)
Dept: FAMILY MEDICINE CLINIC | Age: 48
End: 2018-10-15

## 2018-10-15 VITALS
HEART RATE: 61 BPM | SYSTOLIC BLOOD PRESSURE: 112 MMHG | TEMPERATURE: 98.2 F | DIASTOLIC BLOOD PRESSURE: 62 MMHG | OXYGEN SATURATION: 98 % | RESPIRATION RATE: 16 BRPM

## 2018-10-15 DIAGNOSIS — I63.9 CRYPTOGENIC STROKE (HCC): ICD-10-CM

## 2018-10-15 DIAGNOSIS — F32.A DEPRESSION, UNSPECIFIED DEPRESSION TYPE: ICD-10-CM

## 2018-10-15 DIAGNOSIS — Z12.11 SCREENING FOR COLON CANCER: ICD-10-CM

## 2018-10-15 DIAGNOSIS — E78.5 HYPERLIPIDEMIA, UNSPECIFIED HYPERLIPIDEMIA TYPE: Primary | ICD-10-CM

## 2018-10-15 DIAGNOSIS — Z23 ENCOUNTER FOR IMMUNIZATION: ICD-10-CM

## 2018-10-15 LAB
ALBUMIN SERPL-MCNC: 4.8 G/DL (ref 3.9–4.9)
ALP BLD-CCNC: 52 U/L (ref 40–130)
ALT SERPL-CCNC: 15 U/L (ref 0–33)
ANION GAP SERPL CALCULATED.3IONS-SCNC: 11 MEQ/L (ref 7–13)
AST SERPL-CCNC: 18 U/L (ref 0–35)
BILIRUB SERPL-MCNC: <0.2 MG/DL (ref 0–1.2)
BUN BLDV-MCNC: 20 MG/DL (ref 6–20)
CALCIUM SERPL-MCNC: 9.6 MG/DL (ref 8.6–10.2)
CHLORIDE BLD-SCNC: 101 MEQ/L (ref 98–107)
CO2: 28 MEQ/L (ref 22–29)
CREAT SERPL-MCNC: 0.58 MG/DL (ref 0.5–0.9)
GFR AFRICAN AMERICAN: >60
GFR NON-AFRICAN AMERICAN: >60
GLOBULIN: 2.3 G/DL (ref 2.3–3.5)
GLUCOSE BLD-MCNC: 81 MG/DL (ref 74–109)
POTASSIUM SERPL-SCNC: 4.1 MEQ/L (ref 3.5–5.1)
SODIUM BLD-SCNC: 140 MEQ/L (ref 132–144)
TOTAL PROTEIN: 7.1 G/DL (ref 6.4–8.1)

## 2018-10-15 PROCEDURE — 99214 OFFICE O/P EST MOD 30 MIN: CPT | Performed by: INTERNAL MEDICINE

## 2018-10-15 PROCEDURE — 90688 IIV4 VACCINE SPLT 0.5 ML IM: CPT | Performed by: INTERNAL MEDICINE

## 2018-10-15 PROCEDURE — 90471 IMMUNIZATION ADMIN: CPT | Performed by: INTERNAL MEDICINE

## 2018-10-15 RX ORDER — LEVOTHYROXINE SODIUM 0.1 MG/1
100 TABLET ORAL
COMMUNITY
Start: 2018-03-29 | End: 2018-11-05 | Stop reason: SDUPTHER

## 2018-10-15 RX ORDER — ATORVASTATIN CALCIUM 40 MG/1
TABLET, FILM COATED ORAL
Refills: 0 | COMMUNITY
Start: 2018-09-16 | End: 2019-04-12 | Stop reason: ALTCHOICE

## 2018-10-15 ASSESSMENT — ENCOUNTER SYMPTOMS
BACK PAIN: 0
ABDOMINAL PAIN: 0
SHORTNESS OF BREATH: 0
EYE PAIN: 0

## 2018-10-15 NOTE — PROGRESS NOTES
10 mg by mouth daily 30 tablet 1    vitamin B-12 (CYANOCOBALAMIN) 100 MCG tablet Take 1 tablet by mouth      amLODIPine (NORVASC) 5 MG tablet TAKE 1 TABLET BY MOUTH ONCE DAILY  5    Multiple Vitamin (MVI, CELEBRATE, CHEWABLE TABLET) Take 1 tablet by mouth      aspirin 81 MG tablet Take 81 mg by mouth 2 times daily      Glucosamine-Chondroit-Vit C-Mn (GLUCOSAMINE 1500 COMPLEX PO) Take 1 tablet by mouth daily      fluticasone (FLONASE) 50 MCG/ACT nasal spray 2 sprays      sertraline (ZOLOFT) 50 MG tablet Take 1 tablet by mouth daily 30 tablet 1    ALPRAZolam (XANAX) 0.25 MG tablet TAKE 1 TABLET BY MOUTH AS NEEDED for up to 90 days. 0     No current facility-administered medications on file prior to visit. I have personally reviewed the ROS, PMH, PFH, and social history     Review of Systems   Constitutional: Negative for chills and fever. HENT: Negative for congestion. Eyes: Negative for pain. Respiratory: Negative for shortness of breath. Cardiovascular: Negative for chest pain. Gastrointestinal: Negative for abdominal pain. Genitourinary: Negative for hematuria. Musculoskeletal: Negative for back pain. Allergic/Immunologic: Negative for immunocompromised state. Neurological: Negative for headaches. Psychiatric/Behavioral: Positive for sleep disturbance. Negative for hallucinations, self-injury and suicidal ideas. (depression)-well controlled. Objective:   /62 (Site: Left Upper Arm, Position: Sitting, Cuff Size: Large Adult)   Pulse 61   Temp 98.2 °F (36.8 °C) (Tympanic)   Resp 16   SpO2 98%     Physical Exam   Constitutional: She is oriented to person, place, and time. She appears well-developed and well-nourished. HENT:   Head: Normocephalic. Eyes: Pupils are equal, round, and reactive to light. Neck: No tracheal deviation present. Cardiovascular: Normal rate, regular rhythm and normal heart sounds. Exam reveals no gallop and no friction rub.

## 2018-10-15 NOTE — TELEPHONE ENCOUNTER
Test Ordered: Referral To Unknown External Hematology [LUA540]   Code: OXG889   ORD #: [id number]  [facility]ated Diagnosis: [facility] (I63.9 [ICD-10-CM])          Per Dr. Nuris Edwards,  are you wanting him to see patient for anticoag blood disorder? If no, then he is suggesting referral to cardio. Please advise.

## 2018-10-23 DIAGNOSIS — Z86.73 HISTORY OF STROKE: ICD-10-CM

## 2018-10-24 RX ORDER — ATORVASTATIN CALCIUM 40 MG/1
40 TABLET, FILM COATED ORAL DAILY
Qty: 90 TABLET | Refills: 1 | Status: SHIPPED | OUTPATIENT
Start: 2018-10-24 | End: 2018-11-05 | Stop reason: SDUPTHER

## 2018-11-02 ENCOUNTER — TELEPHONE (OUTPATIENT)
Dept: FAMILY MEDICINE CLINIC | Age: 48
End: 2018-11-02

## 2018-11-02 NOTE — TELEPHONE ENCOUNTER
Left a message for her to call back, scheduling will verify it's covered before they proceed. Then remainder of the costs would depend on her deductible, maxium out of pocket, etc. She would need to call [facility] for that. If she had to see one physician first a neurologist would be most helpful and helping her decide which tests/workup would be most helpful.

## 2018-11-05 DIAGNOSIS — Z86.73 HISTORY OF STROKE: ICD-10-CM

## 2018-11-05 RX ORDER — AMLODIPINE BESYLATE 5 MG/1
TABLET ORAL
Qty: 90 TABLET | Refills: 1 | Status: SHIPPED | OUTPATIENT
Start: 2018-11-05 | End: 2019-05-16 | Stop reason: SDUPTHER

## 2018-11-05 RX ORDER — ATORVASTATIN CALCIUM 40 MG/1
40 TABLET, FILM COATED ORAL DAILY
Qty: 90 TABLET | Refills: 1 | Status: SHIPPED | OUTPATIENT
Start: 2018-11-05 | End: 2019-02-12 | Stop reason: SINTOL

## 2018-11-05 RX ORDER — LEVOTHYROXINE SODIUM 0.1 MG/1
100 TABLET ORAL DAILY
Qty: 90 TABLET | Refills: 1 | Status: SHIPPED | OUTPATIENT
Start: 2018-11-05 | End: 2019-05-16 | Stop reason: SDUPTHER

## 2018-11-26 ENCOUNTER — HOSPITAL ENCOUNTER (OUTPATIENT)
Dept: NON INVASIVE DIAGNOSTICS | Age: 48
Discharge: HOME | End: 2018-11-26
Payer: COMMERCIAL

## 2018-11-26 DIAGNOSIS — I63.9 CRYPTOGENIC STROKE (HCC): ICD-10-CM

## 2018-12-04 ENCOUNTER — TELEPHONE (OUTPATIENT)
Dept: FAMILY MEDICINE CLINIC | Age: 48
End: 2018-12-04

## 2018-12-18 ENCOUNTER — OFFICE VISIT (OUTPATIENT)
Dept: FAMILY MEDICINE CLINIC | Age: 48
End: 2018-12-18
Payer: COMMERCIAL

## 2018-12-18 VITALS
OXYGEN SATURATION: 98 % | RESPIRATION RATE: 18 BRPM | SYSTOLIC BLOOD PRESSURE: 124 MMHG | DIASTOLIC BLOOD PRESSURE: 64 MMHG | HEART RATE: 67 BPM | TEMPERATURE: 98.2 F

## 2018-12-18 DIAGNOSIS — I63.9 CRYPTOGENIC STROKE (HCC): Primary | ICD-10-CM

## 2018-12-18 DIAGNOSIS — Z12.11 SCREENING FOR COLON CANCER: ICD-10-CM

## 2018-12-18 DIAGNOSIS — R15.9 INCONTINENCE OF FECES, UNSPECIFIED FECAL INCONTINENCE TYPE: ICD-10-CM

## 2018-12-18 PROCEDURE — 99214 OFFICE O/P EST MOD 30 MIN: CPT | Performed by: INTERNAL MEDICINE

## 2018-12-18 RX ORDER — FLUTICASONE PROPIONATE 50 MCG
1 SPRAY, SUSPENSION (ML) NASAL DAILY
Qty: 1 BOTTLE | Refills: 2 | Status: SHIPPED | OUTPATIENT
Start: 2018-12-18 | End: 2020-10-26

## 2018-12-18 RX ORDER — ROSUVASTATIN CALCIUM 5 MG/1
5 TABLET, COATED ORAL NIGHTLY
Qty: 30 TABLET | Refills: 2 | Status: SHIPPED | OUTPATIENT
Start: 2018-12-18 | End: 2019-02-12 | Stop reason: SDUPTHER

## 2018-12-18 ASSESSMENT — ENCOUNTER SYMPTOMS
ABDOMINAL PAIN: 0
SHORTNESS OF BREATH: 0
EYE PAIN: 0
BACK PAIN: 0

## 2018-12-18 NOTE — PROGRESS NOTES
Subjective:      Patient ID: Johnathan Browne is a 46 y.o. female who presents today with:  Chief Complaint   Patient presents with    Muscle Pain     tender everywhere    Discuss Medications    Diarrhea     dugan       HPI    Tender pain everywhere, started after she took the lipitor 40 mg once daily. She has history of cyprogenic stroke. She has not had complete follow up due to cost reasons. No known     Fecal incontinence-Chronic issue, no pain. She mentions it's embarrassing. NO pain. Needs colonoscopy regardless. Mentions some bloating and gas as well. Past Medical History:   Diagnosis Date    Depression     Stroke ([facility] 05/2016    Idiopathic she is still in process     Thyroid disease      Past Surgical History:   Procedure Laterality Date    BLADDER SURGERY      DILATION AND CURETTAGE      ENDOMETRIAL ABLATION      TONSILLECTOMY       Social History     Social History    Marital status:      Spouse name: N/A    Number of children: N/A    Years of education: N/A     Occupational History    Not on file.      Social History Main Topics    Smoking status: Former Smoker     Types: Cigarettes     Start date: 7/5/1988     Quit date: 7/5/1990    Smokeless tobacco: Never Used    Alcohol use Yes      Comment: ocassionally    Drug use: No    Sexual activity: Not Currently     Other Topics Concern    Not on file     Social History Narrative    No narrative on file     Allergies   Allergen Reactions    Amoxicillin Hives    Nsaids Other (See Comments)     STROKE/NO NSAIDS ALLOWED    Pcn [Penicillins] Rash     Current Outpatient Prescriptions on File Prior to Visit   Medication Sig Dispense Refill    atorvastatin (LIPITOR) 40 MG tablet Take 1 tablet by mouth daily 90 tablet 1    levothyroxine (SYNTHROID) 100 MCG tablet Take 1 tablet by mouth Daily 90 tablet 1    amLODIPine (NORVASC) 5 MG tablet TAKE 1 TABLET BY MOUTH ONCE DAILY 90 tablet 1    VORTIoxetine (TRINTELLIX) 10 MG TABS

## 2019-02-04 ENCOUNTER — OFFICE VISIT (OUTPATIENT)
Dept: GASTROENTEROLOGY | Age: 49
End: 2019-02-04

## 2019-02-04 VITALS
OXYGEN SATURATION: 97 % | WEIGHT: 149 LBS | HEART RATE: 69 BPM | DIASTOLIC BLOOD PRESSURE: 80 MMHG | SYSTOLIC BLOOD PRESSURE: 118 MMHG | TEMPERATURE: 97.9 F | HEIGHT: 65 IN | BODY MASS INDEX: 24.83 KG/M2

## 2019-02-04 DIAGNOSIS — Z12.11 COLON CANCER SCREENING: Primary | ICD-10-CM

## 2019-02-04 DIAGNOSIS — R15.9 FULL INCONTINENCE OF FECES: ICD-10-CM

## 2019-02-04 PROCEDURE — 99999 PR OFFICE/OUTPT VISIT,PROCEDURE ONLY: CPT | Performed by: NURSE PRACTITIONER

## 2019-02-04 RX ORDER — DULOXETIN HYDROCHLORIDE 60 MG/1
60 CAPSULE, DELAYED RELEASE ORAL DAILY
COMMUNITY
End: 2019-06-05 | Stop reason: CLARIF

## 2019-02-12 DIAGNOSIS — I63.9 CRYPTOGENIC STROKE (HCC): ICD-10-CM

## 2019-02-12 RX ORDER — ROSUVASTATIN CALCIUM 5 MG/1
5 TABLET, COATED ORAL NIGHTLY
Qty: 90 TABLET | Refills: 0 | Status: SHIPPED | OUTPATIENT
Start: 2019-02-12 | End: 2019-06-04 | Stop reason: ALTCHOICE

## 2019-03-04 ENCOUNTER — OUTSIDE SERVICES (OUTPATIENT)
Dept: GASTROENTEROLOGY | Age: 49
End: 2019-03-04
Payer: COMMERCIAL

## 2019-03-04 DIAGNOSIS — R19.7 DIARRHEA, UNSPECIFIED TYPE: ICD-10-CM

## 2019-03-04 DIAGNOSIS — Z12.11 COLON CANCER SCREENING: ICD-10-CM

## 2019-03-04 DIAGNOSIS — R15.9 FULL INCONTINENCE OF FECES: Primary | ICD-10-CM

## 2019-03-04 PROCEDURE — 45380 COLONOSCOPY AND BIOPSY: CPT | Performed by: INTERNAL MEDICINE

## 2019-04-12 ENCOUNTER — TELEPHONE (OUTPATIENT)
Dept: FAMILY MEDICINE CLINIC | Age: 49
End: 2019-04-12

## 2019-04-12 ENCOUNTER — OFFICE VISIT (OUTPATIENT)
Dept: FAMILY MEDICINE CLINIC | Age: 49
End: 2019-04-12
Payer: COMMERCIAL

## 2019-04-12 VITALS
RESPIRATION RATE: 15 BRPM | OXYGEN SATURATION: 98 % | WEIGHT: 148 LBS | HEIGHT: 65 IN | HEART RATE: 78 BPM | TEMPERATURE: 98.9 F | BODY MASS INDEX: 24.66 KG/M2 | DIASTOLIC BLOOD PRESSURE: 88 MMHG | SYSTOLIC BLOOD PRESSURE: 130 MMHG

## 2019-04-12 DIAGNOSIS — G47.00 INSOMNIA, UNSPECIFIED TYPE: ICD-10-CM

## 2019-04-12 DIAGNOSIS — N30.00 ACUTE CYSTITIS WITHOUT HEMATURIA: Primary | ICD-10-CM

## 2019-04-12 DIAGNOSIS — R15.9 INCONTINENCE OF FECES, UNSPECIFIED FECAL INCONTINENCE TYPE: ICD-10-CM

## 2019-04-12 DIAGNOSIS — E04.9 ENLARGED THYROID GLAND: ICD-10-CM

## 2019-04-12 DIAGNOSIS — M25.50 ARTHRALGIA, UNSPECIFIED JOINT: ICD-10-CM

## 2019-04-12 DIAGNOSIS — N30.00 ACUTE CYSTITIS WITHOUT HEMATURIA: ICD-10-CM

## 2019-04-12 PROBLEM — D50.9 IRON DEFICIENCY ANEMIA: Status: ACTIVE | Noted: 2019-04-12

## 2019-04-12 PROBLEM — E03.9 HYPOTHYROIDISM: Status: ACTIVE | Noted: 2019-04-12

## 2019-04-12 PROBLEM — I63.9 CEREBROVASCULAR ACCIDENT (CVA) (HCC): Status: ACTIVE | Noted: 2017-08-29

## 2019-04-12 PROBLEM — M25.851 RIGHT HIP IMPINGEMENT SYNDROME: Status: ACTIVE | Noted: 2017-08-01

## 2019-04-12 PROBLEM — F41.8 DEPRESSION WITH ANXIETY: Status: ACTIVE | Noted: 2017-08-29

## 2019-04-12 LAB
ALBUMIN SERPL-MCNC: 4.9 G/DL (ref 3.5–4.6)
ALP BLD-CCNC: 65 U/L (ref 40–130)
ALT SERPL-CCNC: 15 U/L (ref 0–33)
ANION GAP SERPL CALCULATED.3IONS-SCNC: 16 MEQ/L (ref 9–15)
AST SERPL-CCNC: 22 U/L (ref 0–35)
BACTERIA: NEGATIVE /HPF
BASOPHILS ABSOLUTE: 0 K/UL (ref 0–0.2)
BASOPHILS RELATIVE PERCENT: 0.5 %
BILIRUB SERPL-MCNC: <0.2 MG/DL (ref 0.2–0.7)
BILIRUBIN URINE: NEGATIVE
BLOOD, URINE: NEGATIVE
BUN BLDV-MCNC: 12 MG/DL (ref 6–20)
CALCIUM SERPL-MCNC: 9.6 MG/DL (ref 8.5–9.9)
CHLORIDE BLD-SCNC: 97 MEQ/L (ref 95–107)
CLARITY: CLEAR
CO2: 27 MEQ/L (ref 20–31)
COLOR: YELLOW
CREAT SERPL-MCNC: 0.46 MG/DL (ref 0.5–0.9)
EOSINOPHILS ABSOLUTE: 0.1 K/UL (ref 0–0.7)
EOSINOPHILS RELATIVE PERCENT: 1 %
EPITHELIAL CELLS, UA: NORMAL /HPF (ref 0–5)
FERRITIN: 112.5 NG/ML (ref 13–150)
GFR AFRICAN AMERICAN: >60
GFR NON-AFRICAN AMERICAN: >60
GLOBULIN: 2.8 G/DL (ref 2.3–3.5)
GLUCOSE BLD-MCNC: 84 MG/DL (ref 70–99)
GLUCOSE URINE: NEGATIVE MG/DL
HBA1C MFR BLD: 5.3 % (ref 4.8–5.9)
HCT VFR BLD CALC: 42 % (ref 37–47)
HEMOGLOBIN: 13.7 G/DL (ref 12–16)
HYALINE CASTS: NORMAL /HPF (ref 0–5)
IRON SATURATION: 32 % (ref 11–46)
IRON: 100 UG/DL (ref 37–145)
KETONES, URINE: NEGATIVE MG/DL
LEUKOCYTE ESTERASE, URINE: ABNORMAL
LYMPHOCYTES ABSOLUTE: 2.3 K/UL (ref 1–4.8)
LYMPHOCYTES RELATIVE PERCENT: 30.3 %
MCH RBC QN AUTO: 30.1 PG (ref 27–31.3)
MCHC RBC AUTO-ENTMCNC: 32.6 % (ref 33–37)
MCV RBC AUTO: 92.4 FL (ref 82–100)
MONOCYTES ABSOLUTE: 0.4 K/UL (ref 0.2–0.8)
MONOCYTES RELATIVE PERCENT: 5.6 %
NEUTROPHILS ABSOLUTE: 4.7 K/UL (ref 1.4–6.5)
NEUTROPHILS RELATIVE PERCENT: 62.6 %
NITRITE, URINE: NEGATIVE
PDW BLD-RTO: 13.1 % (ref 11.5–14.5)
PH UA: 7 (ref 5–9)
PLATELET # BLD: 250 K/UL (ref 130–400)
POTASSIUM SERPL-SCNC: 3.7 MEQ/L (ref 3.4–4.9)
PROTEIN UA: NEGATIVE MG/DL
RBC # BLD: 4.55 M/UL (ref 4.2–5.4)
RBC UA: NORMAL /HPF (ref 0–5)
SODIUM BLD-SCNC: 140 MEQ/L (ref 135–144)
SPECIFIC GRAVITY UA: 1 (ref 1–1.03)
TOTAL IRON BINDING CAPACITY: 314 UG/DL (ref 178–450)
TOTAL PROTEIN: 7.7 G/DL (ref 6.3–8)
TSH SERPL DL<=0.05 MIU/L-ACNC: 0.58 UIU/ML (ref 0.44–3.86)
URINE REFLEX TO CULTURE: YES
UROBILINOGEN, URINE: 0.2 E.U./DL
WBC # BLD: 7.5 K/UL (ref 4.8–10.8)
WBC UA: NORMAL /HPF (ref 0–5)

## 2019-04-12 PROCEDURE — 99214 OFFICE O/P EST MOD 30 MIN: CPT | Performed by: INTERNAL MEDICINE

## 2019-04-12 RX ORDER — SULFAMETHOXAZOLE AND TRIMETHOPRIM 800; 160 MG/1; MG/1
1 TABLET ORAL 2 TIMES DAILY
Qty: 10 TABLET | Refills: 0 | Status: SHIPPED | OUTPATIENT
Start: 2019-04-12 | End: 2019-04-17

## 2019-04-12 ASSESSMENT — ENCOUNTER SYMPTOMS
ABDOMINAL PAIN: 0
BACK PAIN: 0
EYE PAIN: 0
SHORTNESS OF BREATH: 0

## 2019-04-12 ASSESSMENT — PATIENT HEALTH QUESTIONNAIRE - PHQ9
1. LITTLE INTEREST OR PLEASURE IN DOING THINGS: 1
2. FEELING DOWN, DEPRESSED OR HOPELESS: 1
SUM OF ALL RESPONSES TO PHQ9 QUESTIONS 1 & 2: 2
SUM OF ALL RESPONSES TO PHQ QUESTIONS 1-9: 2
SUM OF ALL RESPONSES TO PHQ QUESTIONS 1-9: 2

## 2019-04-12 NOTE — PROGRESS NOTES
Subjective:      Patient ID: Niall Parikh is a 44 y.o. female who presents today with:  Chief Complaint   Patient presents with    Urinary Tract Infection     burning, odor with urine, dark urine, lower back pain    Discuss Labs     results from Colon screening     Insomnia    Other     fecal incontinence       HPI     UTI-Burning, frequency, questionable lower back pain. She mentions she always has back pain. (That's not new)     Fecal incontinence-Not improved with kegel, had colonoscopy and biopsies taken. Curious about other options. Not much better at all. Sensation of choking-On and off, was worried about her thyroid. No pain with swallowing, no fevers or chills. Insomnia -Was on remfresh wsan't working. Curious about other options. Complains of some stress and depression with health concerns regarding her daughter.        Past Medical History:   Diagnosis Date    Depression     Iron deficiency anemia 2019    Stroke ([facility] 2016    Idiopathic she is still in process     Thyroid disease      Past Surgical History:   Procedure Laterality Date    BLADDER SURGERY      DILATION AND CURETTAGE      ENDOMETRIAL ABLATION      TONSILLECTOMY       Social History     Socioeconomic History    Marital status:      Spouse name: Not on file    Number of children: Not on file    Years of education: Not on file    Highest education level: Not on file   Occupational History    Not on file   Social Needs    Financial resource strain: Not on file    Food insecurity:     Worry: Not on file     Inability: Not on file    Transportation needs:     Medical: Not on file     Non-medical: Not on file   Tobacco Use    Smoking status: Former Smoker     Types: Cigarettes     Start date: 1988     Last attempt to quit: 1990     Years since quittin.7    Smokeless tobacco: Never Used   Substance and Sexual Activity    Alcohol use: Yes     Comment: ocassionally    Drug use: No    Negative for chest pain. Gastrointestinal: Negative for abdominal pain. Genitourinary: Positive for dysuria. Negative for hematuria. Musculoskeletal: Negative for back pain. Allergic/Immunologic: Negative for immunocompromised state. Neurological: Negative for headaches. Psychiatric/Behavioral: Negative for hallucinations. Objective:   /88 (Site: Left Upper Arm, Position: Sitting, Cuff Size: Large Adult)   Pulse 78   Temp 98.9 °F (37.2 °C) (Tympanic)   Resp 15   Ht 5' 5\" (1.651 m)   Wt 148 lb (67.1 kg)   LMP 10/12/2018   SpO2 98%   BMI 24.63 kg/m²     Physical Exam   Constitutional: She is oriented to person, place, and time. She appears well-developed and well-nourished. HENT:   Head: Normocephalic. Enlarged thyroid    Eyes: Pupils are equal, round, and reactive to light. Neck: No tracheal deviation present. Cardiovascular: Normal rate, regular rhythm and normal heart sounds. Exam reveals no gallop and no friction rub. No murmur heard. Pulmonary/Chest: No respiratory distress. Abdominal: Soft. Bowel sounds are normal. She exhibits no distension. There is no rebound. Musculoskeletal: She exhibits no edema. Neurological: She is oriented to person, place, and time. Skin: Skin is warm and dry. Assessment:       Diagnosis Orders   1. Acute cystitis without hematuria  C.trachomatis N.gonorrhoeae DNA, Urine    CBC Auto Differential    Comprehensive Metabolic Panel    TSH without Reflex    Hemoglobin A1C    Iron And Tibc    Ferritin    Urine Reflex to Culture    sulfamethoxazole-trimethoprim (BACTRIM DS) 800-160 MG per tablet   2. Arthralgia, unspecified joint  C.trachomatis N.gonorrhoeae DNA, Urine    CBC Auto Differential    Comprehensive Metabolic Panel    TSH without Reflex    Hemoglobin A1C    Iron And Tibc    Ferritin   3.  Insomnia, unspecified type  CBC Auto Differential    Comprehensive Metabolic Panel    TSH without Reflex    Hemoglobin A1C    Iron And Tibc    Ferritin   4. Incontinence of feces, unspecified fecal incontinence type  Isabel Tuttle MD, Colorectal Surgery, [place]    Ferritin   5. Enlarged thyroid gland  US HEAD NECK SOFT TISSUE THYROID    Iron And Tibc    Ferritin         Plan:    wean off cymbalta   Restart trintellex   start trazodone once stable on trintellex  Referral to surgery (she want to wait)  Hold on infammatory markers  I think she has some symptoms of fibromyalgia  Under some stress  No si/hi  Labs per below  Don't think pyelo  Thyroid us concerned about goiter. No flank tenderness  Intermittent \"aches and pains\"  Bactrim  Chronci aches and pains (Fibro)  Call if uti sx get worse or focal back pain, fevers, chills, etc.   Please take yogurt and or probiotics, if you develop any diarrhea call us immediately. There is a risk of c-diff with any antibiotic, this will need to be treated and can have serious and even lethal consequences if not.    Will finalize colonoscopy follow up will   Call in 2 weeks if still having arthralgias and will get rf, ccp, etc.   Close follow up   Orders Placed This Encounter   Procedures    C.trachomatis N.gonorrhoeae DNA, Urine     Standing Status:   Future     Number of Occurrences:   1     Standing Expiration Date:   4/12/2020    US HEAD NECK SOFT TISSUE THYROID     Standing Status:   Future     Standing Expiration Date:   4/12/2020     Order Specific Question:   Reason for exam:     Answer:   large thyroid    CBC Auto Differential     Standing Status:   Future     Number of Occurrences:   1     Standing Expiration Date:   4/12/2020    Comprehensive Metabolic Panel     Standing Status:   Future     Number of Occurrences:   1     Standing Expiration Date:   4/11/2020    TSH without Reflex     Standing Status:   Future     Number of Occurrences:   1     Standing Expiration Date:   4/11/2020    Hemoglobin A1C     Standing Status:   Future     Number of Occurrences:   1     Standing Expiration Date:   4/11/2020    Iron And Tibc     Standing Status:   Future     Number of Occurrences:   1     Standing Expiration Date:   4/12/2020     Order Specific Question:   Is Patient Fasting? Answer:   yes     Order Specific Question:   No of Hours? Answer:   8    Ferritin     Standing Status:   Future     Number of Occurrences:   1     Standing Expiration Date:   4/12/2020    Urine Reflex to Culture     Standing Status:   Future     Number of Occurrences:   1     Standing Expiration Date:   4/12/2020     Order Specific Question:   SPECIFY(EX-CATH,MIDSTREAM,CYSTO,ETC)? Answer:   mid stream   Melani Burgos MD, Colorectal Surgery, [place]     Referral Priority:   Routine     Referral Type:   Eval and Treat     Referral Reason:   Specialty Services Required     Referred to Provider:   Darrick Damon MD     Requested Specialty:   Colon and Rectal Surgery     Number of Visits Requested:   1     Orders Placed This Encounter   Medications    sulfamethoxazole-trimethoprim (BACTRIM DS) 800-160 MG per tablet     Sig: Take 1 tablet by mouth 2 times daily for 5 days     Dispense:  10 tablet     Refill:  0       Return in about 1 month (around 5/10/2019) for regularly scheduled appointment with PCP, worsening symptoms, call ASAP for appointment. Elise Glaser MD    If anything should change or worsen call ASAP, don't wait for next scheduled appointment.

## 2019-04-14 LAB — URINE CULTURE, ROUTINE: NORMAL

## 2019-04-17 LAB
C. TRACHOMATIS DNA ,URINE: NEGATIVE
N. GONORRHOEAE DNA, URINE: NEGATIVE

## 2019-04-22 ENCOUNTER — OFFICE VISIT (OUTPATIENT)
Dept: FAMILY MEDICINE CLINIC | Age: 49
End: 2019-04-22
Payer: COMMERCIAL

## 2019-04-22 VITALS
OXYGEN SATURATION: 99 % | HEIGHT: 65 IN | DIASTOLIC BLOOD PRESSURE: 74 MMHG | RESPIRATION RATE: 12 BRPM | TEMPERATURE: 98.2 F | HEART RATE: 66 BPM | SYSTOLIC BLOOD PRESSURE: 112 MMHG | BODY MASS INDEX: 24.66 KG/M2 | WEIGHT: 148 LBS

## 2019-04-22 DIAGNOSIS — J02.9 SORE THROAT: ICD-10-CM

## 2019-04-22 DIAGNOSIS — J06.9 VIRAL URI: Primary | ICD-10-CM

## 2019-04-22 LAB — S PYO AG THROAT QL: NORMAL

## 2019-04-22 PROCEDURE — 99213 OFFICE O/P EST LOW 20 MIN: CPT | Performed by: NURSE PRACTITIONER

## 2019-04-22 PROCEDURE — 87880 STREP A ASSAY W/OPTIC: CPT | Performed by: NURSE PRACTITIONER

## 2019-04-22 RX ORDER — BROMPHENIRAMINE MALEATE, PSEUDOEPHEDRINE HYDROCHLORIDE, AND DEXTROMETHORPHAN HYDROBROMIDE 2; 30; 10 MG/5ML; MG/5ML; MG/5ML
10 SYRUP ORAL 4 TIMES DAILY PRN
Qty: 240 ML | Refills: 0 | Status: SHIPPED | OUTPATIENT
Start: 2019-04-22 | End: 2019-06-04 | Stop reason: ALTCHOICE

## 2019-04-22 ASSESSMENT — ENCOUNTER SYMPTOMS
EYES NEGATIVE: 1
ALLERGIC/IMMUNOLOGIC NEGATIVE: 1
COUGH: 0
RHINORRHEA: 0
VOMITING: 0
SHORTNESS OF BREATH: 0
SORE THROAT: 1
ABDOMINAL PAIN: 0
BACK PAIN: 0
NAUSEA: 0
SINUS PRESSURE: 1
WHEEZING: 0
SINUS COMPLAINT: 1

## 2019-04-22 NOTE — PROGRESS NOTES
Subjective  [address] [facility], 44 y.o. female presents today with:  Chief Complaint   Patient presents with    Sinusitis     Sinus congestion, ear pain, headaches, sore throat X 3 days. Pt was exposed to strep. Would not like to be tested until seen. Sinus Problem   This is a new problem. The current episode started in the past 7 days. The problem is unchanged. There has been no fever. Associated symptoms include congestion, ear pain, headaches, sinus pressure and a sore throat. Pertinent negatives include no chills, coughing or shortness of breath. Past treatments include nothing. Review of Systems   Constitutional: Negative for appetite change, chills, fatigue, fever and unexpected weight change. HENT: Positive for congestion, ear pain, sinus pressure and sore throat. Negative for rhinorrhea. Eyes: Negative. Respiratory: Negative for cough, shortness of breath and wheezing. Cardiovascular: Negative for chest pain. Gastrointestinal: Negative for abdominal pain, nausea and vomiting. Endocrine: Negative. Genitourinary: Negative for difficulty urinating, dysuria, menstrual problem, pelvic pain, vaginal bleeding, vaginal discharge and vaginal pain. Musculoskeletal: Negative for back pain. Skin: Negative. Allergic/Immunologic: Negative. Neurological: Positive for headaches. Hematological: Negative. Psychiatric/Behavioral: Negative.         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     Thyroid disease      Past Surgical History:   Procedure Laterality Date    BLADDER SURGERY      COLONOSCOPY  03/04/2019    KERRY AMADO MD    DILATION AND CURETTAGE      ENDOMETRIAL ABLATION      TONSILLECTOMY       Social History     Socioeconomic History    Marital status:      Spouse name: Not on file    Number of children: Not on file    Years of education: Not on file    Highest education level: Not on file   Occupational History    Not on file   Social Needs    Financial resource strain: Not on file    Food insecurity:     Worry: Not on file     Inability: Not on file    Transportation needs:     Medical: Not on file     Non-medical: Not on file   Tobacco Use    Smoking status: Former Smoker     Types: Cigarettes     Start date: 1988     Last attempt to quit: 1990     Years since quittin.8    Smokeless tobacco: Never Used   Substance and Sexual Activity    Alcohol use: Yes     Comment: ocassionally    Drug use: No    Sexual activity: Not Currently   Lifestyle    Physical activity:     Days per week: Not on file     Minutes per session: Not on file    Stress: Not on file   Relationships    Social connections:     Talks on phone: Not on file     Gets together: Not on file     Attends Jewish service: Not on file     Active member of club or organization: Not on file     Attends meetings of clubs or organizations: Not on file     Relationship status: Not on file    Intimate partner violence:     Fear of current or ex partner: Not on file     Emotionally abused: Not on file     Physically abused: Not on file     Forced sexual activity: Not on file   Other Topics Concern    Not on file   Social History Narrative    Not on file     History reviewed. No pertinent family history.   Allergies   Allergen Reactions    Amoxicillin Hives    Nsaids Other (See Comments)     STROKE/NO NSAIDS ALLOWED    Pcn [Penicillins] Rash     Current Outpatient Medications   Medication Sig Dispense Refill    brompheniramine-pseudoephedrine-DM 2-30-10 MG/5ML syrup Take 10 mLs by mouth 4 times daily as needed for Congestion or Cough 240 mL 0    rosuvastatin (CRESTOR) 5 MG tablet Take 1 tablet by mouth nightly 90 tablet 0    DULoxetine (CYMBALTA) 60 MG extended release capsule Take 60 mg by mouth daily      fluticasone (FLONASE) 50 MCG/ACT nasal spray 1 spray by Nasal route daily 1 Bottle 2    levothyroxine (SYNTHROID) 100 MCG tablet Take 1 tablet by mouth Daily 90 tablet 1    amLODIPine (NORVASC) 5 MG tablet TAKE 1 TABLET BY MOUTH ONCE DAILY 90 tablet 1    vitamin B-12 (CYANOCOBALAMIN) 100 MCG tablet Take 1 tablet by mouth      Multiple Vitamin (MVI, CELEBRATE, CHEWABLE TABLET) Take 1 tablet by mouth      aspirin 81 MG tablet Take 81 mg by mouth 2 times daily       No current facility-administered medications for this visit. PMH, Surgical Hx, Family Hx, and Social Hxreviewed and updated. Health Maintenance reviewed. Objective    Vitals:    04/22/19 1641   BP: 112/74   Site: Left Upper Arm   Position: Sitting   Cuff Size: Medium Adult   Pulse: 66   Resp: 12   Temp: 98.2 °F (36.8 °C)   TempSrc: Oral   SpO2: 99%   Weight: 148 lb (67.1 kg)   Height: 5' 5\" (1.651 m)       Physical Exam   Constitutional: She is oriented to person, place, and time. She appears well-developed and well-nourished. No distress. HENT:   Right Ear: External ear normal.   Left Ear: External ear normal.   Nose: Nose normal.   Mouth/Throat: Oropharynx is clear and moist.   Eyes: Pupils are equal, round, and reactive to light. Neck: Normal range of motion. Cardiovascular: Normal rate and regular rhythm. Pulmonary/Chest: Effort normal and breath sounds normal. No respiratory distress. She has no wheezes. She has no rales. Abdominal: Soft. Bowel sounds are normal. She exhibits no distension and no mass. There is no tenderness. There is no rebound and no guarding. No hernia. Musculoskeletal: Normal range of motion. Neurological: She is alert and oriented to person, place, and time. Skin: Skin is warm and dry. Capillary refill takes less than 2 seconds. Psychiatric: She has a normal mood and affect. Nursing note and vitals reviewed. Assessment & Plan    Diagnosis Orders   1. Viral URI     2.  Sore throat  POCT rapid strep A     Orders Placed This Encounter   Procedures    POCT rapid strep A     Orders Placed This Encounter   Medications    brompheniramine-pseudoephedrine-DM 2-30-10 MG/5ML syrup     Sig: Take 10 mLs by mouth 4 times daily as needed for Congestion or Cough     Dispense:  240 mL     Refill:  0     There are no discontinued medications. Return if symptoms worsen or fail to improve. Reviewed with the patient: current clinical status, medications, activities and diet. Side effects, adverse effects of the medication prescribed today, as well as treatment plan/ rationale and resultexpectations have been discussed with the patient who expresses understanding and desires to proceed. Close follow up to evaluate treatment resultsand for coordination of care. I have reviewed the patient's medical history in detail and updated the computerized patient record.     Mei Lara, RN, NP

## 2019-05-16 RX ORDER — LEVOTHYROXINE SODIUM 0.1 MG/1
100 TABLET ORAL DAILY
Qty: 90 TABLET | Refills: 1 | Status: SHIPPED | OUTPATIENT
Start: 2019-05-16 | End: 2019-07-26 | Stop reason: SDUPTHER

## 2019-05-16 RX ORDER — AMLODIPINE BESYLATE 5 MG/1
TABLET ORAL
Qty: 90 TABLET | Refills: 1 | Status: SHIPPED | OUTPATIENT
Start: 2019-05-16 | End: 2019-07-26 | Stop reason: SDUPTHER

## 2019-05-17 ENCOUNTER — OFFICE VISIT (OUTPATIENT)
Dept: SURGERY | Age: 49
End: 2019-05-17
Payer: COMMERCIAL

## 2019-05-17 VITALS
BODY MASS INDEX: 24.99 KG/M2 | TEMPERATURE: 97.9 F | OXYGEN SATURATION: 98 % | HEIGHT: 65 IN | WEIGHT: 150 LBS | HEART RATE: 69 BPM

## 2019-05-17 DIAGNOSIS — R15.9 FULL INCONTINENCE OF FECES: Primary | ICD-10-CM

## 2019-05-17 PROCEDURE — 99203 OFFICE O/P NEW LOW 30 MIN: CPT | Performed by: COLON & RECTAL SURGERY

## 2019-05-17 ASSESSMENT — ENCOUNTER SYMPTOMS
ABDOMINAL DISTENTION: 0
ANAL BLEEDING: 0
CHEST TIGHTNESS: 0
DIARRHEA: 0
CONSTIPATION: 0
RECTAL PAIN: 0
VOMITING: 0
ABDOMINAL PAIN: 0
NAUSEA: 0

## 2019-05-17 NOTE — PROGRESS NOTES
Subjective:      Patient ID: Edna Kumar is a 44 y.o. female who presents for:  Chief Complaint   Patient presents with    New Patient       This is a 47-year-old female who has had issues over the past year with worsening fecal incontinence. There is occasionally with loose stool but she has described incontinence of formed stool in the recent past.  She's had some rectal bleeding as well. She had a colonoscopy by Dr. Joel Troy which was unremarkable. She is here for surgical evaluation. She had 2 vaginal childbirths between 30 and 27 years ago. Both were associated with an episiotomy. 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 She has full recovery. Cause of her CVA is unknown.       Past Medical History:   Diagnosis Date    Depression     Iron deficiency anemia 2019    Stroke ([address] 2016    Idiopathic she is still in process     Thyroid disease      Past Surgical History:   Procedure Laterality Date    BLADDER SURGERY      COLONOSCOPY  2019    KERRY AMADO MD    DILATION AND CURETTAGE      ENDOMETRIAL ABLATION      TONSILLECTOMY       Social History     Socioeconomic History    Marital status:      Spouse name: Not on file    Number of children: Not on file    Years of education: Not on file    Highest education level: Not on file   Occupational History    Not on file   Social Needs    Financial resource strain: Not on file    Food insecurity:     Worry: Not on file     Inability: Not on file    Transportation needs:     Medical: Not on file     Non-medical: Not on file   Tobacco Use    Smoking status: Former Smoker     Types: Cigarettes     Start date: 1988     Last attempt to quit: 1990     Years since quittin.8    Smokeless tobacco: Never Used   Substance and Sexual Activity    Alcohol use: Yes     Comment: ocassionally    Drug use: No    Sexual activity: Not Currently   Lifestyle    Physical activity:     Days per week: Not on file     Minutes per session: Not on file    Stress: Not on file   Relationships    Social connections:     Talks on phone: Not on file     Gets together: Not on file     Attends Jew service: Not on file     Active member of club or organization: Not on file     Attends meetings of clubs or organizations: Not on file     Relationship status: Not on file    Intimate partner violence:     Fear of current or ex partner: Not on file     Emotionally abused: Not on file     Physically abused: Not on file     Forced sexual activity: Not on file   Other Topics Concern    Not on file   Social History Narrative    Not on file     History reviewed. No pertinent family history. Allergies:  Amoxicillin; Nsaids; and Pcn [penicillins]    Review of Systems   Constitutional: Negative for activity change, chills and fever. Respiratory: Negative for chest tightness. Cardiovascular: Negative for chest pain. Gastrointestinal: Negative for abdominal distention, abdominal pain, anal bleeding, constipation, diarrhea, nausea, rectal pain and vomiting. Genitourinary: Negative for difficulty urinating. Musculoskeletal: Negative for arthralgias. Neurological: Negative for dizziness, seizures, light-headedness and headaches. Psychiatric/Behavioral: Negative for agitation and confusion. Objective:    Pulse 69   Temp 97.9 °F (36.6 °C) (Temporal)   Ht 5' 5\" (1.651 m)   Wt 150 lb (68 kg)   SpO2 98%   BMI 24.96 kg/m²     Physical Exam   Constitutional: She is oriented to person, place, and time. She appears well-developed and well-nourished. HENT:   Head: Normocephalic and atraumatic. Eyes: Pupils are equal, round, and reactive to light. Neck: Normal range of motion. Neck supple. Cardiovascular: Normal rate, regular rhythm and normal heart sounds. Pulmonary/Chest: Effort normal and breath sounds normal. No respiratory distress. She has no wheezes. Abdominal: She exhibits no distension and no mass. There is no tenderness. There is no rebound and no guarding. Genitourinary:   Genitourinary Comments: No significant external hemorrhoid seen. Digital exam is remarkable for a patulous anus with no significant squeeze pressures on Valsalva. There appears to be an anterior defect by physical exam.   Musculoskeletal: Normal range of motion. Neurological: She is alert and oriented to person, place, and time. Skin: Skin is warm and dry. No rash noted. No erythema. No pallor. Psychiatric: She has a normal mood and affect. Her behavior is normal. Judgment normal.            Assessment/Plan:          Diagnosis Orders   1. Full incontinence of feces        I reviewed all the records in her chart. I discussed the benefits of an endorectal ultrasound to evaluate the sphincter muscles especially in the anterior position given her clinical history. She wishes to proceed. I have contacted Dr. Niels Davenport and forwarded this note so that his office might contact her regarding scheduling this ultrasound procedure soon. Please note this report has beenpartially produced using speech recognition software and may cause contain errors related to that system including grammar, punctuation and spelling as well as words and phrases that may seem inappropriate.  If there arequestions or concerns please feel free to contact me to clarify

## 2019-05-30 ENCOUNTER — TELEPHONE (OUTPATIENT)
Dept: FAMILY MEDICINE CLINIC | Age: 49
End: 2019-05-30

## 2019-06-04 ENCOUNTER — OFFICE VISIT (OUTPATIENT)
Dept: FAMILY MEDICINE CLINIC | Age: 49
End: 2019-06-04
Payer: COMMERCIAL

## 2019-06-04 VITALS
HEART RATE: 62 BPM | DIASTOLIC BLOOD PRESSURE: 66 MMHG | WEIGHT: 151 LBS | BODY MASS INDEX: 25.16 KG/M2 | HEIGHT: 65 IN | OXYGEN SATURATION: 98 % | RESPIRATION RATE: 15 BRPM | SYSTOLIC BLOOD PRESSURE: 126 MMHG | TEMPERATURE: 98 F

## 2019-06-04 DIAGNOSIS — R15.9 FULL INCONTINENCE OF FECES: ICD-10-CM

## 2019-06-04 DIAGNOSIS — J01.10 ACUTE NON-RECURRENT FRONTAL SINUSITIS: Primary | ICD-10-CM

## 2019-06-04 DIAGNOSIS — F32.A DEPRESSION, UNSPECIFIED DEPRESSION TYPE: ICD-10-CM

## 2019-06-04 DIAGNOSIS — Z86.73 HISTORY OF STROKE: ICD-10-CM

## 2019-06-04 PROCEDURE — 99214 OFFICE O/P EST MOD 30 MIN: CPT | Performed by: INTERNAL MEDICINE

## 2019-06-04 RX ORDER — DOXYCYCLINE HYCLATE 100 MG
100 TABLET ORAL 2 TIMES DAILY
Qty: 14 TABLET | Refills: 0 | Status: SHIPPED | OUTPATIENT
Start: 2019-06-04 | End: 2019-06-11

## 2019-06-04 ASSESSMENT — ENCOUNTER SYMPTOMS
EYE PAIN: 0
ABDOMINAL PAIN: 0
SHORTNESS OF BREATH: 0
BACK PAIN: 0

## 2019-06-04 NOTE — PROGRESS NOTES
Subjective:      Patient ID: Kat Ortiz is a 44 y.o. female who presents today with:  Chief Complaint   Patient presents with    Sinus Problem     pain, pressure and drainage, has only taken OTC products     Cough     productive with white mucous, itchy throat        HPI   Sinus pain and pressure with a little bit of raspy throat. Flonase and OTC allergy pill helped. (Loratadine). \"A lot better\" and getting the stuff out.  Still some sinus pressure and     Past Medical History:   Diagnosis Date    Depression     Iron deficiency anemia 2019    Stroke ([address] 2016    Idiopathic she is still in process     Thyroid disease      Past Surgical History:   Procedure Laterality Date    BLADDER SURGERY      COLONOSCOPY  2019    KERRY AMADO MD    DILATION AND CURETTAGE      ENDOMETRIAL ABLATION      TONSILLECTOMY       Social History     Socioeconomic History    Marital status:      Spouse name: Not on file    Number of children: Not on file    Years of education: Not on file    Highest education level: Not on file   Occupational History    Not on file   Social Needs    Financial resource strain: Not on file    Food insecurity:     Worry: Not on file     Inability: Not on file    Transportation needs:     Medical: Not on file     Non-medical: Not on file   Tobacco Use    Smoking status: Former Smoker     Types: Cigarettes     Start date: 1988     Last attempt to quit: 1990     Years since quittin.9    Smokeless tobacco: Never Used   Substance and Sexual Activity    Alcohol use: Yes     Comment: ocassionally    Drug use: No    Sexual activity: Not Currently   Lifestyle    Physical activity:     Days per week: Not on file     Minutes per session: Not on file    Stress: Not on file   Relationships    Social connections:     Talks on phone: Not on file     Gets together: Not on file     Attends Alevism service: Not on file     Active member of club or organization: Not on file     Attends meetings of clubs or organizations: Not on file     Relationship status: Not on file    Intimate partner violence:     Fear of current or ex partner: Not on file     Emotionally abused: Not on file     Physically abused: Not on file     Forced sexual activity: Not on file   Other Topics Concern    Not on file   Social History Narrative    Not on file     Allergies   Allergen Reactions    Amoxicillin Hives    Nsaids Other (See Comments)     STROKE/NO NSAIDS ALLOWED    Pcn [Penicillins] Rash     Current Outpatient Medications on File Prior to Visit   Medication Sig Dispense Refill    VORTIoxetine (TRINTELLIX) 10 MG TABS tablet Take 10 mg by mouth daily      levothyroxine (SYNTHROID) 100 MCG tablet Take 1 tablet by mouth Daily 90 tablet 1    amLODIPine (NORVASC) 5 MG tablet TAKE 1 TABLET BY MOUTH ONCE DAILY 90 tablet 1    fluticasone (FLONASE) 50 MCG/ACT nasal spray 1 spray by Nasal route daily 1 Bottle 2    aspirin 81 MG tablet Take 81 mg by mouth 2 times daily      DULoxetine (CYMBALTA) 60 MG extended release capsule Take 60 mg by mouth daily      vitamin B-12 (CYANOCOBALAMIN) 100 MCG tablet Take 1 tablet by mouth      Multiple Vitamin (MVI, CELEBRATE, CHEWABLE TABLET) Take 1 tablet by mouth       No current facility-administered medications on file prior to visit. I have personally reviewed the ROS, PMH, PFH, and social history     Review of Systems   Constitutional: Negative for chills and fever. HENT: Negative for congestion. Eyes: Negative for pain. Respiratory: Negative for shortness of breath. Cardiovascular: Negative for chest pain. Gastrointestinal: Negative for abdominal pain. Genitourinary: Negative for hematuria. Musculoskeletal: Negative for back pain. Allergic/Immunologic: Negative for immunocompromised state. Neurological: Negative for headaches. Psychiatric/Behavioral: Negative for hallucinations.        Objective:   BP 126/66 (Site: Left Upper Arm, Position: Sitting, Cuff Size: Large Adult)   Pulse 62   Temp 98 °F (36.7 °C) (Tympanic)   Resp 15   Ht 5' 5\" (1.651 m)   Wt 151 lb (68.5 kg)   SpO2 98%   BMI 25.13 kg/m²     Physical Exam   Constitutional: She is oriented to person, place, and time. She appears well-developed and well-nourished. HENT:   Head: Normocephalic. Uvula is midline and mucous membranes are normal. Posterior oropharynx without erythema. No oropharyngeal exudate, posterior oropharyngeal edema or tonsillar abscesses. Slightly inflamed inferior turbinates  BL tm without erythema    Eyes: Pupils are equal, round, and reactive to light. Neck: No tracheal deviation present. Cardiovascular: Normal rate, regular rhythm and normal heart sounds. Exam reveals no gallop and no friction rub. No murmur heard. Pulmonary/Chest: No respiratory distress. Abdominal: Soft. Bowel sounds are normal. She exhibits no distension. There is no rebound. Musculoskeletal: She exhibits no edema. Neurological: She is oriented to person, place, and time. Skin: Skin is warm and dry. Assessment:       Diagnosis Orders   1. Acute non-recurrent frontal sinusitis     2. Full incontinence of feces     3. Depression, unspecified depression type     4. History of stroke  Lipid Panel         Plan: Add mucinex and saline spray  If not improvement then take doxy  Please take yogurt and or probiotics, if you develop any diarrhea call us immediately. There is a risk of c-diff with any antibiotic, this will need to be treated and can have serious and even lethal consequences if not. If something worsens call asap. Has EUS of anal sphincter. Seeing OB/GYN most likely cystoscele or rectocele, no symptoms  On trintellex no depression (uncontrolled) No si/hi. She's on fence about getting Rectal US due to cost  Has thyroid US pending.     Didn't tolerate crestor  Understands risk of stroke, and goal ldl under 70  Repeat lipid panel and then decision on plan   Orders Placed This Encounter   Procedures    Lipid Panel     Standing Status:   Future     Standing Expiration Date:   6/4/2020     Order Specific Question:   Is Patient Fasting?/# of Hours     Answer:   8     Orders Placed This Encounter   Medications    doxycycline hyclate (VIBRA-TABS) 100 MG tablet     Sig: Take 1 tablet by mouth 2 times daily for 7 days     Dispense:  14 tablet     Refill:  0       Return for regularly scheduled appointment with PCP, worsening symptoms, call ASAP for appointment. Steve Valdivia MD    If anything should change or worsen call ASAP, don't wait for next scheduled appointment.

## 2019-06-05 ENCOUNTER — OFFICE VISIT (OUTPATIENT)
Dept: OBGYN CLINIC | Age: 49
End: 2019-06-05
Payer: COMMERCIAL

## 2019-06-05 VITALS — DIASTOLIC BLOOD PRESSURE: 72 MMHG | BODY MASS INDEX: 25.13 KG/M2 | SYSTOLIC BLOOD PRESSURE: 116 MMHG | HEIGHT: 65 IN

## 2019-06-05 DIAGNOSIS — N81.89 PELVIC FLOOR WEAKNESS IN FEMALE: Primary | ICD-10-CM

## 2019-06-05 DIAGNOSIS — R15.9 FULL INCONTINENCE OF FECES: ICD-10-CM

## 2019-06-05 PROCEDURE — 99214 OFFICE O/P EST MOD 30 MIN: CPT | Performed by: OBSTETRICS & GYNECOLOGY

## 2019-06-05 ASSESSMENT — ENCOUNTER SYMPTOMS
BLOOD IN STOOL: 0
VOMITING: 0
NAUSEA: 0
SHORTNESS OF BREATH: 0
CONSTIPATION: 0
WHEEZING: 0
DIARRHEA: 0
APNEA: 0
ANAL BLEEDING: 1
ABDOMINAL DISTENTION: 0
COUGH: 0
ABDOMINAL PAIN: 0
SORE THROAT: 0

## 2019-06-05 NOTE — PROGRESS NOTES
Subjective:      Patient ID:  Pauly Snyder is a 44 y.o. female with chief complaint of:  Chief Complaint   Patient presents with    Incontinence     pt reports she seen bulge that she was able to push back up, unsure if it is her uterus or bladder onset 5/29. reports bowl issues, incont and urinary incont -VB/pain. She presents today for evaluation and management of pelvic floor dysfunction. Patient has a history of frequent urination and urgency with some incontinence. Patient wakes at night as well to urinate. These symptoms have worsened in the last 6 months and she is noted some protrusion of tissue that she can palpate when she is wiping. Patient has also been bothered at the same time with new onset of fecal incontinence. She says she has intermittent loss of control of when she defecates as well as when she passes gas. Patient was seen by the gastroenterologist as well as a colorectal surgeon and underwent a colonoscopy and physical exam.  Colon surgeon believes that there is some defect in the anterior rectal sphincter and would like patient to have analGram.  Patient is reluctant to proceed with this evaluation secondary to cost at this time she would have to prepare for thousand allergies to deductible for said procedure. She has has not had any other intervention. Patient does not frequently sexually active with evaluation and questioning she does admit to rectal intercourse      Past Medical History:   Diagnosis Date    Depression     Iron deficiency anemia 4/12/2019    Stroke ([facility] [address] 05/2016    Idiopathic she is still in process     Thyroid disease      Past Surgical History:   Procedure Laterality Date    BLADDER SURGERY      done by Dr. Mamta Huitron x10 yrs    COLONOSCOPY  03/04/2019    KERRY AMADO MD    DILATION AND CURETTAGE      ENDOMETRIAL ABLATION      TONSILLECTOMY       No family history on file.   Current Outpatient Medications on File Prior to Visit   Medication Sig Dispense Refill    VORTIoxetine (TRINTELLIX) 10 MG TABS tablet Take 10 mg by mouth daily      levothyroxine (SYNTHROID) 100 MCG tablet Take 1 tablet by mouth Daily 90 tablet 1    amLODIPine (NORVASC) 5 MG tablet TAKE 1 TABLET BY MOUTH ONCE DAILY 90 tablet 1    fluticasone (FLONASE) 50 MCG/ACT nasal spray 1 spray by Nasal route daily 1 Bottle 2    aspirin 81 MG tablet Take 81 mg by mouth 2 times daily      doxycycline hyclate (VIBRA-TABS) 100 MG tablet Take 1 tablet by mouth 2 times daily for 7 days 14 tablet 0     No current facility-administered medications on file prior to visit. Allergies:  Amoxicillin; Nsaids; and Pcn [penicillins]    Review of Systems   Constitutional: Negative for activity change, appetite change, fatigue, fever and unexpected weight change. HENT: Negative for nosebleeds and sore throat. Eyes: Negative for visual disturbance. Respiratory: Negative for apnea, cough, shortness of breath and wheezing. Cardiovascular: Negative for chest pain, palpitations and leg swelling. Gastrointestinal: Positive for anal bleeding. Negative for abdominal distention, abdominal pain, blood in stool, constipation, diarrhea, nausea and vomiting. Incontinence of gas and fecal matter   Endocrine: Negative for cold intolerance, heat intolerance, polydipsia and polyuria. Genitourinary: Positive for frequency and urgency. Negative for difficulty urinating, dyspareunia, dysuria, genital sores, hematuria, menstrual problem, pelvic pain, vaginal bleeding, vaginal discharge and vaginal pain. Intermittent urinary incontinence noted primarily with urgency minimal with stress   Musculoskeletal: Negative for arthralgias. Skin: Negative for rash. Neurological: Negative for dizziness, weakness, light-headedness and headaches. Patient is status post a recent CVA stay she has no residual and she still being worked up   Hematological: Negative for adenopathy. Does not bruise/bleed easily. Psychiatric/Behavioral: Negative for agitation, confusion and sleep disturbance. Objective:   /72   Ht 5' 5\" (1.651 m)   LMP 11/01/2018 (LMP Unknown) Comment: approx  BMI 25.13 kg/m²      Physical Exam   Constitutional: She is oriented to person, place, and time. She appears well-developed and well-nourished. Cardiovascular: Normal rate, regular rhythm, normal heart sounds and intact distal pulses. Pulmonary/Chest: Effort normal.   Abdominal: Soft. Bowel sounds are normal.   Genitourinary: There is no rash or tenderness on the right labia. There is no rash or tenderness on the left labia. Uterus is not deviated, not enlarged, not fixed and not tender. Cervix exhibits no motion tenderness, no discharge and no friability. No tenderness in the vagina. Genitourinary Comments: Minimal's bladder prolapse no uterine prolapse poor tone vaginally and rectally patient unable to elicit adequate rectal tone with Valsalva and with Julia the same was noted of the vaginal perineal body   Neurological: She is alert and oriented to person, place, and time. She exhibits abnormal muscle tone (Vaginal tone and rectal tone). Psychiatric: She has a normal mood and affect. Assessment:       Diagnosis Orders   1. Pelvic floor weakness in female  Ambulatory referral to Physical Therapy   2. Full incontinence of feces  Ambulatory referral to Physical Therapy         Plan:      Orders Placed This Encounter   Procedures    Ambulatory referral to Physical Therapy     Referral Priority:   Routine     Referral Type:   Physical Medicine     Referral Reason:   Specialty Services Required     Requested Specialty:   Physical Therapy     Number of Visits Requested:   1     No orders of the defined types were placed in this encounter.   Patient is ambiguous about the anal evaluation will at this time recommend pelvic floor physical therapy and however in order to assess sphincter dysfunction patient needs to consider the Analgram.  This was discussed    No follow-ups on file.      Damari Calle, DO

## 2019-06-05 NOTE — PROGRESS NOTES
The patient was asked if she would like a chaperone present for her intimate exam. She  Declined the chaperone.  [place]    Patient accepts doctor student to be present/perform exam.

## 2019-06-12 DIAGNOSIS — M62.89 PELVIC FLOOR DYSFUNCTION: Primary | ICD-10-CM

## 2019-07-26 ENCOUNTER — TELEPHONE (OUTPATIENT)
Dept: FAMILY MEDICINE CLINIC | Age: 49
End: 2019-07-26

## 2019-07-26 RX ORDER — AMLODIPINE BESYLATE 5 MG/1
TABLET ORAL
Qty: 90 TABLET | Refills: 1 | Status: SHIPPED | OUTPATIENT
Start: 2019-07-26 | End: 2019-08-16

## 2019-07-26 RX ORDER — LEVOTHYROXINE SODIUM 0.1 MG/1
100 TABLET ORAL DAILY
Qty: 90 TABLET | Refills: 1 | Status: SHIPPED | OUTPATIENT
Start: 2019-07-26 | End: 2019-08-16

## 2019-07-26 NOTE — TELEPHONE ENCOUNTER
Pt called office requesting medication refill. Pt aware provider out of office. Pt states she has enough medication until provider returns to office. Rx requested:  Requested Prescriptions     Pending Prescriptions Disp Refills    levothyroxine (SYNTHROID) 100 MCG tablet 90 tablet 1     Sig: Take 1 tablet by mouth Daily    amLODIPine (NORVASC) 5 MG tablet 90 tablet 1     Sig: TAKE 1 TABLET BY MOUTH ONCE DAILY    VORTIoxetine (TRINTELLIX) 10 MG TABS tablet       Sig: Take 1 tablet by mouth daily       Last Office Visit:   6/4/2019    Next Visit Date:  No future appointments.

## 2019-07-27 DIAGNOSIS — E03.9 ACQUIRED HYPOTHYROIDISM: Primary | ICD-10-CM

## 2019-08-02 ENCOUNTER — TELEPHONE (OUTPATIENT)
Dept: FAMILY MEDICINE CLINIC | Age: 49
End: 2019-08-02

## 2019-08-02 DIAGNOSIS — M25.50 ARTHRALGIA, UNSPECIFIED JOINT: Primary | ICD-10-CM

## 2019-08-02 NOTE — TELEPHONE ENCOUNTER
Patient is asking if an order to check on possible Arthritis  Is something that can be added to her outstanding blood work.   Patient had d/c'd medications that could have been causing her pain, which did not help      Mackenzie Dumas [phone]

## 2019-08-05 DIAGNOSIS — M25.50 ARTHRALGIA, UNSPECIFIED JOINT: Primary | ICD-10-CM

## 2019-08-10 DIAGNOSIS — M25.50 ARTHRALGIA, UNSPECIFIED JOINT: ICD-10-CM

## 2019-08-10 DIAGNOSIS — E03.9 ACQUIRED HYPOTHYROIDISM: ICD-10-CM

## 2019-08-10 DIAGNOSIS — Z86.73 HISTORY OF STROKE: ICD-10-CM

## 2019-08-10 LAB
CHOLESTEROL, TOTAL: 227 MG/DL (ref 0–199)
HDLC SERPL-MCNC: 103 MG/DL (ref 40–59)
LDL CHOLESTEROL CALCULATED: 112 MG/DL (ref 0–129)
RHEUMATOID FACTOR: <10 IU/ML (ref 0–14)
TRIGL SERPL-MCNC: 58 MG/DL (ref 0–150)
TSH REFLEX: 2.18 UIU/ML (ref 0.44–3.86)

## 2019-08-13 LAB
ANA IGG, ELISA: NORMAL
ANTINUCLEAR AB INTERPRETIVE COMMENT: ABNORMAL
ANTINUCLEAR ANTIBODY, HEP-2, IGG: DETECTED
CCP IGG ANTIBODIES: 2 UNITS (ref 0–19)
RHEUMATOID FACTOR: <10 IU/ML (ref 0–14)

## 2019-08-14 LAB
ANTI JO-1 IGG: 0 AU/ML (ref 0–40)
DOUBLE STRANDED DNA AB, IGG: NORMAL
ENA TO RNP ANTIBODY: 1 AU/ML (ref 0–40)
ENA TO SMITH (SM) ANTIBODY: 0 AU/ML (ref 0–40)
ENA TO SSB (LA) ANTIBODY: 0 AU/ML (ref 0–40)
SCLERODERMA (SCL-70) AB: 0 AU/ML (ref 0–40)
SSA 52 (RO) (ENA) AB, IGG: 1 AU/ML (ref 0–40)
SSA 60 (RO) (ENA) AB, IGG: 0 AU/ML (ref 0–40)

## 2019-08-16 ENCOUNTER — TELEPHONE (OUTPATIENT)
Dept: FAMILY MEDICINE CLINIC | Age: 49
End: 2019-08-16

## 2019-08-16 RX ORDER — LEVOTHYROXINE SODIUM 0.1 MG/1
100 TABLET ORAL DAILY
Qty: 6 TABLET | Refills: 0 | Status: SHIPPED | OUTPATIENT
Start: 2019-08-16 | End: 2020-02-06 | Stop reason: SDUPTHER

## 2019-08-16 RX ORDER — AMLODIPINE BESYLATE 5 MG/1
5 TABLET ORAL DAILY
Qty: 6 TABLET | Refills: 0 | Status: SHIPPED | OUTPATIENT
Start: 2019-08-16 | End: 2020-02-06

## 2019-08-16 NOTE — TELEPHONE ENCOUNTER
6 days sent, cannot due xanax with discussion and csa. I see she has an appointment tomorrow can discuss then.

## 2019-08-17 ENCOUNTER — TELEPHONE (OUTPATIENT)
Dept: FAMILY MEDICINE CLINIC | Age: 49
End: 2019-08-17

## 2019-08-17 ENCOUNTER — OFFICE VISIT (OUTPATIENT)
Dept: FAMILY MEDICINE CLINIC | Age: 49
End: 2019-08-17
Payer: COMMERCIAL

## 2019-08-17 VITALS
DIASTOLIC BLOOD PRESSURE: 80 MMHG | TEMPERATURE: 98.3 F | RESPIRATION RATE: 16 BRPM | HEIGHT: 65 IN | HEART RATE: 72 BPM | OXYGEN SATURATION: 98 % | SYSTOLIC BLOOD PRESSURE: 126 MMHG | BODY MASS INDEX: 25.13 KG/M2

## 2019-08-17 DIAGNOSIS — E78.5 HYPERLIPIDEMIA, UNSPECIFIED HYPERLIPIDEMIA TYPE: ICD-10-CM

## 2019-08-17 DIAGNOSIS — G43.809 OTHER MIGRAINE WITHOUT STATUS MIGRAINOSUS, NOT INTRACTABLE: ICD-10-CM

## 2019-08-17 DIAGNOSIS — I10 ESSENTIAL HYPERTENSION: ICD-10-CM

## 2019-08-17 DIAGNOSIS — F32.A DEPRESSION, UNSPECIFIED DEPRESSION TYPE: Primary | ICD-10-CM

## 2019-08-17 DIAGNOSIS — F41.9 ANXIETY: ICD-10-CM

## 2019-08-17 PROCEDURE — 99214 OFFICE O/P EST MOD 30 MIN: CPT | Performed by: INTERNAL MEDICINE

## 2019-08-17 RX ORDER — BUTALBITAL, ACETAMINOPHEN AND CAFFEINE 300; 40; 50 MG/1; MG/1; MG/1
1 CAPSULE ORAL DAILY PRN
Qty: 15 CAPSULE | Refills: 0 | Status: SHIPPED | OUTPATIENT
Start: 2019-08-17 | End: 2019-09-15 | Stop reason: SDUPTHER

## 2019-08-17 RX ORDER — LORAZEPAM 0.5 MG/1
0.5 TABLET ORAL DAILY PRN
Qty: 15 TABLET | Refills: 0 | Status: SHIPPED | OUTPATIENT
Start: 2019-08-17 | End: 2019-09-15 | Stop reason: SDUPTHER

## 2019-08-17 RX ORDER — ATORVASTATIN CALCIUM 10 MG/1
10 TABLET, FILM COATED ORAL DAILY
Qty: 30 TABLET | Refills: 0 | Status: SHIPPED | OUTPATIENT
Start: 2019-08-17 | End: 2019-09-15 | Stop reason: SDUPTHER

## 2019-08-17 ASSESSMENT — ENCOUNTER SYMPTOMS
BACK PAIN: 0
EYE PAIN: 0
SHORTNESS OF BREATH: 0
ABDOMINAL PAIN: 0

## 2019-08-17 NOTE — PROGRESS NOTES
1990     Last attempt to quit: 1990     Years since quittin.1    Smokeless tobacco: Never Used   Substance and Sexual Activity    Alcohol use: Yes     Comment: ocassionally    Drug use: No    Sexual activity: Not Currently   Lifestyle    Physical activity:     Days per week: Not on file     Minutes per session: Not on file    Stress: Not on file   Relationships    Social connections:     Talks on phone: Not on file     Gets together: Not on file     Attends Rastafarian service: Not on file     Active member of club or organization: Not on file     Attends meetings of clubs or organizations: Not on file     Relationship status: Not on file    Intimate partner violence:     Fear of current or ex partner: Not on file     Emotionally abused: Not on file     Physically abused: Not on file     Forced sexual activity: Not on file   Other Topics Concern    Not on file   Social History Narrative    Not on file     Allergies   Allergen Reactions    Amoxicillin Hives    Nsaids Other (See Comments)     STROKE/NO NSAIDS ALLOWED    Pcn [Penicillins] Rash     Current Outpatient Medications on File Prior to Visit   Medication Sig Dispense Refill    levothyroxine (SYNTHROID) 100 MCG tablet Take 1 tablet by mouth daily for 6 days 6 tablet 0    amLODIPine (NORVASC) 5 MG tablet Take 1 tablet by mouth daily for 6 days 6 tablet 0    fluticasone (FLONASE) 50 MCG/ACT nasal spray 1 spray by Nasal route daily 1 Bottle 2    aspirin 81 MG tablet Take 81 mg by mouth 2 times daily       No current facility-administered medications on file prior to visit. I have personally reviewed the ROS, PMH, PFH, and social history     Review of Systems   Constitutional: Negative for chills and fever. HENT: Negative for congestion. Eyes: Negative for pain. Respiratory: Negative for shortness of breath. Cardiovascular: Negative for chest pain. Gastrointestinal: Negative for abdominal pain.    Genitourinary:

## 2019-08-17 NOTE — PATIENT INSTRUCTIONS
Patient Education        lorazepam (oral)  Pronunciation:  carlito A ze konstantin  Brand:  Ativan  What is the most important information I should know about lorazepam?  You should not use this medicine if you have narrow-angle glaucoma or myasthenia gravis, or if you are allergic to Valium or a similar medicine. Do not use lorazepam if you are pregnant. This medicine can cause birth defects or life-threatening withdrawal symptoms in a . Lorazepam may be habit-forming. Misuse of habit-forming medicine can cause addiction, overdose, or death. What is lorazepam?  Lorazepam is a benzodiazepine (nakita-rodo-dye-AZE-eh-peen). Lorazepam affects chemicals in the brain that may be unbalanced in people with anxiety. Lorazepam is used to treat anxiety disorders. Lorazepam may also be used for purposes not listed in this medication guide. What should I discuss with my healthcare provider before taking lorazepam?  It is dangerous to purchase lorazepam on the Internet or from vendors outside the [place]. Medications distributed from [employer] may contain dangerous ingredients, or may not be distributed by a licensed pharmacy. The sale and distribution of lorazepam outside the [place] does not comply with the regulations of the Food and Drug Administration (FDA) for the safe use of this medication. You should not take lorazepam if you have:  · narrow-angle glaucoma;  · myasthenia gravis; or  · a history of allergic reaction to any benzodiazepine, such as diazepam (Valium), chlordiazepoxide, clonazepam, flurazepam, and others. To make sure lorazepam is safe for you, tell your doctor if you have:  · seizures or epilepsy;  · kidney or liver disease (especially alcoholic liver disease);  · asthma or other breathing disorder;  · open-angle glaucoma;  · a history of depression or suicidal thoughts or behavior;  · a history of drug or alcohol addiction; or  · if you use a narcotic (opioid) medication.   Do not use lorazepam if or  · feeling unsteady. This is not a complete list of side effects and others may occur. Tell your doctor about any unusual or bothersome side effect. You may report side effects to FDA at 6-548-FDA-3862. What other drugs will affect lorazepam?  Taking lorazepam with other drugs that make you sleepy or slow your breathing can cause dangerous side effects or death. Ask your doctor before taking a sleeping pill, narcotic pain medicine, prescription cough medicine, a muscle relaxer, or medicine for anxiety, depression, or seizures. Tell your doctor about all your current medicines and any you start or stop using, especially:  · any other medicines to treat anxiety;  · probenecid;  · aminophylline or theophylline;  · an antidepressant, or medicine to treat mental illness;  · a barbiturate such as phenobarbital;  · narcotic pain medicine;  · seizure medicine; or  · medicine that contains an antihistamine (such as sleep medicine, cold or allergy medicine). This list is not complete. Other drugs may interact with lorazepam, including prescription and over-the-counter medicines, vitamins, and herbal products. Not all possible interactions are listed in this medication guide. Where can I get more information? Your pharmacist can provide more information about lorazepam.  Remember, keep this and all other medicines out of the reach of children, never share your medicines with others, and use this medication only for the indication prescribed. Every effort has been made to ensure that the information provided by [address] is accurate, up-to-date, and complete, but no guarantee is made to that effect. Drug information contained herein may be time sensitive. [facility] information has been compiled for use by healthcare practitioners and consumers in the [place] and therefore [facility] does not warrant that uses outside of the [place] are appropriate, unless specifically indicated otherwise. and coughing up blood or vomit that looks like coffee grounds. Aspirin should not be given to a child or teenager who has a fever. Butalbital may be habit-forming. Misuse of habit-forming medicine can cause addiction, overdose, or death. What is aspirin, butalbital, and caffeine? Aspirin is a pain reliever, as well as an anti-inflammatory and a fever reducer. Butalbital is a barbiturate. It relaxes muscle contractions involved in a tension headache. Caffeine is a central nervous system stimulant. It relaxes muscle contractions in blood vessels to improve blood flow. Aspirin, butalbital, and caffeine is a combination medicine used to treat tension headaches. This medicine is not for treating headaches that come and go. Aspirin, butalbital, and caffeine may also be used for purposes not those listed in this medication guide. What should I discuss with my healthcare provider before taking aspirin, butalbital, and caffeine? Aspirin may cause stomach or intestinal bleeding, which can be fatal. This can occur without warning while you are taking this medicine. You should not use this medicine if you are allergic to aspirin, butalbital, or caffeine, or if you have:  · stomach ulcer;  · severe liver disease;  · porphyria (a genetic enzyme disorder that causes symptoms affecting the skin or nervous system);  · a bleeding or blood clotting disorder (such as hemophilia); or  · asthma, or a history of severe allergic reaction (sneezing, runny or stuffy nose, wheezing, shortness of breath) after taking aspirin or another NSAID (nonsteroidal anti-inflammatory drug) such as Advil, Motrin, Aleve, Celebrex, Orudis, Indocin, Lodine, Voltaren, Toradol, Mobic, Relafen, Feldene, and others.   To make sure aspirin, butalbital, and caffeine is safe for you, tell your doctor if you have:  · liver or kidney disease;  · asthma or another respiratory disease;  · fluid retention;  · heart disease, congestive heart failure, high blood doctor, nurse or pharmacist.  Copyright 3564-6318 [employer] [facility]. Version: 2.03. Revision date: 11/28/2016. Care instructions adapted under license by [facility] ([facility]). If you have questions about a medical condition or this instruction, always ask your healthcare professional. [place] [address] any warranty or liability for your use of this information.

## 2019-09-13 ENCOUNTER — TELEPHONE (OUTPATIENT)
Dept: FAMILY MEDICINE CLINIC | Age: 49
End: 2019-09-13

## 2019-10-18 ENCOUNTER — OFFICE VISIT (OUTPATIENT)
Dept: FAMILY MEDICINE CLINIC | Age: 49
End: 2019-10-18
Payer: COMMERCIAL

## 2019-10-18 VITALS
HEART RATE: 74 BPM | HEIGHT: 65 IN | OXYGEN SATURATION: 98 % | SYSTOLIC BLOOD PRESSURE: 122 MMHG | BODY MASS INDEX: 25.99 KG/M2 | WEIGHT: 156 LBS | RESPIRATION RATE: 15 BRPM | DIASTOLIC BLOOD PRESSURE: 62 MMHG | TEMPERATURE: 98.9 F

## 2019-10-18 DIAGNOSIS — E04.9 GOITER: ICD-10-CM

## 2019-10-18 DIAGNOSIS — J32.9 SINUSITIS, UNSPECIFIED CHRONICITY, UNSPECIFIED LOCATION: ICD-10-CM

## 2019-10-18 DIAGNOSIS — M25.50 ARTHRALGIA, UNSPECIFIED JOINT: Primary | ICD-10-CM

## 2019-10-18 PROCEDURE — G8598 ASA/ANTIPLAT THER USED: HCPCS | Performed by: INTERNAL MEDICINE

## 2019-10-18 PROCEDURE — 1036F TOBACCO NON-USER: CPT | Performed by: INTERNAL MEDICINE

## 2019-10-18 PROCEDURE — 3017F COLORECTAL CA SCREEN DOC REV: CPT | Performed by: INTERNAL MEDICINE

## 2019-10-18 PROCEDURE — G8419 CALC BMI OUT NRM PARAM NOF/U: HCPCS | Performed by: INTERNAL MEDICINE

## 2019-10-18 PROCEDURE — G8427 DOCREV CUR MEDS BY ELIG CLIN: HCPCS | Performed by: INTERNAL MEDICINE

## 2019-10-18 PROCEDURE — 99213 OFFICE O/P EST LOW 20 MIN: CPT | Performed by: INTERNAL MEDICINE

## 2019-10-18 PROCEDURE — G8484 FLU IMMUNIZE NO ADMIN: HCPCS | Performed by: INTERNAL MEDICINE

## 2019-10-18 RX ORDER — DOXYCYCLINE HYCLATE 100 MG
100 TABLET ORAL 2 TIMES DAILY
Qty: 14 TABLET | Refills: 0 | Status: SHIPPED | OUTPATIENT
Start: 2019-10-18 | End: 2019-10-25

## 2019-10-18 RX ORDER — AZITHROMYCIN 250 MG/1
250 TABLET, FILM COATED ORAL PRN
Refills: 0 | COMMUNITY
Start: 2019-10-15 | End: 2020-02-06 | Stop reason: ALTCHOICE

## 2019-10-18 ASSESSMENT — ENCOUNTER SYMPTOMS
EYE PAIN: 0
SINUS PRESSURE: 1
SINUS PAIN: 1
BACK PAIN: 0
SHORTNESS OF BREATH: 0
ABDOMINAL PAIN: 0

## 2019-10-26 DIAGNOSIS — M25.50 ARTHRALGIA, UNSPECIFIED JOINT: ICD-10-CM

## 2019-10-26 LAB
C-REACTIVE PROTEIN: 0.4 MG/L (ref 0–5)
SEDIMENTATION RATE, ERYTHROCYTE: 7 MM (ref 0–30)

## 2019-11-03 DIAGNOSIS — Z12.39 SCREENING FOR BREAST CANCER: Primary | ICD-10-CM

## 2019-11-03 DIAGNOSIS — M25.50 ARTHRALGIA, UNSPECIFIED JOINT: Primary | ICD-10-CM

## 2019-11-13 ENCOUNTER — TELEPHONE (OUTPATIENT)
Dept: FAMILY MEDICINE CLINIC | Age: 49
End: 2019-11-13

## 2019-12-11 ENCOUNTER — TELEPHONE (OUTPATIENT)
Dept: FAMILY MEDICINE CLINIC | Age: 49
End: 2019-12-11

## 2019-12-11 DIAGNOSIS — R76.8 ELEVATED ANTINUCLEAR ANTIBODY (ANA) LEVEL: Primary | ICD-10-CM

## 2019-12-12 ENCOUNTER — OFFICE VISIT (OUTPATIENT)
Dept: FAMILY MEDICINE CLINIC | Age: 49
End: 2019-12-12
Payer: COMMERCIAL

## 2019-12-12 VITALS
SYSTOLIC BLOOD PRESSURE: 106 MMHG | WEIGHT: 155 LBS | OXYGEN SATURATION: 99 % | TEMPERATURE: 97.1 F | DIASTOLIC BLOOD PRESSURE: 72 MMHG | HEART RATE: 74 BPM | BODY MASS INDEX: 25.79 KG/M2

## 2019-12-12 DIAGNOSIS — R10.9 LEFT FLANK PAIN: ICD-10-CM

## 2019-12-12 DIAGNOSIS — R10.9 LEFT FLANK PAIN: Primary | ICD-10-CM

## 2019-12-12 LAB
ANION GAP SERPL CALCULATED.3IONS-SCNC: 13 MEQ/L (ref 9–15)
BACTERIA: NEGATIVE /HPF
BILIRUBIN URINE: NEGATIVE
BLOOD, URINE: ABNORMAL
BUN BLDV-MCNC: 15 MG/DL (ref 6–20)
CALCIUM SERPL-MCNC: 9.6 MG/DL (ref 8.5–9.9)
CHLORIDE BLD-SCNC: 104 MEQ/L (ref 95–107)
CLARITY: CLEAR
CO2: 28 MEQ/L (ref 20–31)
COLOR: YELLOW
CREAT SERPL-MCNC: 0.6 MG/DL (ref 0.5–0.9)
EPITHELIAL CELLS, UA: ABNORMAL /HPF (ref 0–5)
GFR AFRICAN AMERICAN: >60
GFR NON-AFRICAN AMERICAN: >60
GLUCOSE BLD-MCNC: 75 MG/DL (ref 70–99)
GLUCOSE URINE: NEGATIVE MG/DL
HYALINE CASTS: ABNORMAL /HPF (ref 0–5)
KETONES, URINE: NEGATIVE MG/DL
LEUKOCYTE ESTERASE, URINE: NEGATIVE
NITRITE, URINE: NEGATIVE
PH UA: 6.5 (ref 5–9)
POTASSIUM SERPL-SCNC: 3.8 MEQ/L (ref 3.4–4.9)
PROTEIN UA: NEGATIVE MG/DL
RBC UA: ABNORMAL /HPF (ref 0–5)
SODIUM BLD-SCNC: 145 MEQ/L (ref 135–144)
SPECIFIC GRAVITY UA: 1.02 (ref 1–1.03)
URINE REFLEX TO CULTURE: YES
UROBILINOGEN, URINE: 0.2 E.U./DL
WBC UA: ABNORMAL /HPF (ref 0–5)

## 2019-12-12 PROCEDURE — 1036F TOBACCO NON-USER: CPT | Performed by: INTERNAL MEDICINE

## 2019-12-12 PROCEDURE — 3017F COLORECTAL CA SCREEN DOC REV: CPT | Performed by: INTERNAL MEDICINE

## 2019-12-12 PROCEDURE — G8484 FLU IMMUNIZE NO ADMIN: HCPCS | Performed by: INTERNAL MEDICINE

## 2019-12-12 PROCEDURE — 99213 OFFICE O/P EST LOW 20 MIN: CPT | Performed by: INTERNAL MEDICINE

## 2019-12-12 PROCEDURE — G8419 CALC BMI OUT NRM PARAM NOF/U: HCPCS | Performed by: INTERNAL MEDICINE

## 2019-12-12 PROCEDURE — G8598 ASA/ANTIPLAT THER USED: HCPCS | Performed by: INTERNAL MEDICINE

## 2019-12-12 PROCEDURE — G8427 DOCREV CUR MEDS BY ELIG CLIN: HCPCS | Performed by: INTERNAL MEDICINE

## 2019-12-12 RX ORDER — LEVOTHYROXINE SODIUM 100 MCG
100 TABLET ORAL
COMMUNITY
Start: 2019-12-06 | End: 2020-02-06

## 2019-12-12 RX ORDER — LORAZEPAM 0.5 MG/1
0.5 TABLET ORAL
COMMUNITY
Start: 2019-12-07 | End: 2020-10-26

## 2019-12-12 RX ORDER — CYCLOBENZAPRINE HCL 10 MG
10 TABLET ORAL 3 TIMES DAILY PRN
Qty: 30 TABLET | Refills: 0 | Status: SHIPPED | OUTPATIENT
Start: 2019-12-12 | End: 2019-12-22

## 2019-12-12 RX ORDER — AMLODIPINE BESYLATE 5 MG/1
5 TABLET ORAL
COMMUNITY
Start: 2019-12-06 | End: 2020-02-06 | Stop reason: SDUPTHER

## 2019-12-12 ASSESSMENT — ENCOUNTER SYMPTOMS
SINUS PRESSURE: 0
APNEA: 0
DIARRHEA: 0
BLOOD IN STOOL: 0
EYE ITCHING: 0
PHOTOPHOBIA: 0
EYE DISCHARGE: 0
VOICE CHANGE: 0
SORE THROAT: 0
TROUBLE SWALLOWING: 0
ABDOMINAL PAIN: 0
NAUSEA: 0
RECTAL PAIN: 0
COUGH: 0
BACK PAIN: 0
COLOR CHANGE: 0
FACIAL SWELLING: 0
EYE PAIN: 0
CONSTIPATION: 0
EYE REDNESS: 0
RHINORRHEA: 0
SINUS PAIN: 0
WHEEZING: 0
SHORTNESS OF BREATH: 0
CHEST TIGHTNESS: 0
ABDOMINAL DISTENTION: 0
VOMITING: 0

## 2019-12-14 LAB — URINE CULTURE, ROUTINE: NORMAL

## 2019-12-15 DIAGNOSIS — R31.9 HEMATURIA, UNSPECIFIED TYPE: Primary | ICD-10-CM

## 2019-12-20 ENCOUNTER — TELEPHONE (OUTPATIENT)
Dept: FAMILY MEDICINE CLINIC | Age: 49
End: 2019-12-20

## 2019-12-20 RX ORDER — BUPROPION HYDROCHLORIDE 150 MG/1
150 TABLET ORAL EVERY MORNING
Qty: 30 TABLET | Refills: 0 | Status: SHIPPED | OUTPATIENT
Start: 2019-12-20 | End: 2020-02-06 | Stop reason: ALTCHOICE

## 2020-01-03 RX ORDER — DOXYCYCLINE HYCLATE 100 MG
100 TABLET ORAL 2 TIMES DAILY
Qty: 14 TABLET | Refills: 0 | Status: SHIPPED | OUTPATIENT
Start: 2020-01-03 | End: 2020-01-10

## 2020-01-30 ENCOUNTER — TELEPHONE (OUTPATIENT)
Dept: FAMILY MEDICINE CLINIC | Age: 50
End: 2020-01-30

## 2020-01-31 ENCOUNTER — TELEPHONE (OUTPATIENT)
Dept: FAMILY MEDICINE CLINIC | Age: 50
End: 2020-01-31

## 2020-01-31 NOTE — TELEPHONE ENCOUNTER
----- Message from Alyson Kline sent at 1/29/2020  4:45 PM EST -----  Steffany Burch would like to go to dr Justin Stapleton and kamala marley for rheumatology.     Thanks   Lauri Cope

## 2020-01-31 NOTE — TELEPHONE ENCOUNTER
Pt called. She finally heard back from Dr. Radha Hoyt and was able to schedule an appt on 2/28. Pt didn't know if she needed a new referral for Dr. Radha Hoyt or if the original one was still good. Pt would like a phone call to verify, thank you!

## 2020-02-05 ENCOUNTER — TELEPHONE (OUTPATIENT)
Dept: FAMILY MEDICINE CLINIC | Age: 50
End: 2020-02-05

## 2020-02-05 NOTE — TELEPHONE ENCOUNTER
Pt wanted advice or wanting to see if you could call addition medication in for her right lower flank/back pain. She was seen originally for this in 12/12/2019. She has tried Lidocaine patch, heat, massage, stretching and pain has gotten worse since 1/30/2020. Does not want to use muscle relaxant as that just makes her sleepy. She made appt for this afternoon, but if she could avoid that and have something called in, she would do that.      Pharm: [facility]

## 2020-02-05 NOTE — TELEPHONE ENCOUNTER
I would ask Dr. Brenda Pino to prescribe, I cannot do a narcotic but can do anti inflammatory, if she doesn't hear from Dr. Batsheva Lerma by 8 pm then please have her call office to have me paged. If she has urinary incontinence, fevers,  chills,  numbness in or around groin,  Weakness, etc then she should call asap.

## 2020-02-06 ENCOUNTER — OFFICE VISIT (OUTPATIENT)
Dept: FAMILY MEDICINE CLINIC | Age: 50
End: 2020-02-06
Payer: COMMERCIAL

## 2020-02-06 VITALS
WEIGHT: 152 LBS | SYSTOLIC BLOOD PRESSURE: 108 MMHG | TEMPERATURE: 98.6 F | HEIGHT: 65 IN | RESPIRATION RATE: 15 BRPM | OXYGEN SATURATION: 97 % | BODY MASS INDEX: 25.33 KG/M2 | HEART RATE: 72 BPM | DIASTOLIC BLOOD PRESSURE: 54 MMHG

## 2020-02-06 DIAGNOSIS — M54.41 ACUTE RIGHT-SIDED LOW BACK PAIN WITH RIGHT-SIDED SCIATICA: ICD-10-CM

## 2020-02-06 LAB
BILIRUBIN URINE: NEGATIVE
BLOOD, URINE: NEGATIVE
CLARITY: CLEAR
COLOR: YELLOW
GLUCOSE URINE: NEGATIVE MG/DL
KETONES, URINE: NEGATIVE MG/DL
LEUKOCYTE ESTERASE, URINE: NEGATIVE
NITRITE, URINE: NEGATIVE
PH UA: 7 (ref 5–9)
PROTEIN UA: NEGATIVE MG/DL
SPECIFIC GRAVITY UA: 1.01 (ref 1–1.03)
UROBILINOGEN, URINE: 0.2 E.U./DL

## 2020-02-06 PROCEDURE — 3017F COLORECTAL CA SCREEN DOC REV: CPT | Performed by: INTERNAL MEDICINE

## 2020-02-06 PROCEDURE — G8419 CALC BMI OUT NRM PARAM NOF/U: HCPCS | Performed by: INTERNAL MEDICINE

## 2020-02-06 PROCEDURE — 90471 IMMUNIZATION ADMIN: CPT | Performed by: INTERNAL MEDICINE

## 2020-02-06 PROCEDURE — 1036F TOBACCO NON-USER: CPT | Performed by: INTERNAL MEDICINE

## 2020-02-06 PROCEDURE — G8482 FLU IMMUNIZE ORDER/ADMIN: HCPCS | Performed by: INTERNAL MEDICINE

## 2020-02-06 PROCEDURE — 99213 OFFICE O/P EST LOW 20 MIN: CPT | Performed by: INTERNAL MEDICINE

## 2020-02-06 PROCEDURE — G8427 DOCREV CUR MEDS BY ELIG CLIN: HCPCS | Performed by: INTERNAL MEDICINE

## 2020-02-06 PROCEDURE — 90688 IIV4 VACCINE SPLT 0.5 ML IM: CPT | Performed by: INTERNAL MEDICINE

## 2020-02-06 RX ORDER — LEVOTHYROXINE SODIUM 0.1 MG/1
100 TABLET ORAL DAILY
Qty: 90 TABLET | Refills: 3 | Status: SHIPPED | OUTPATIENT
Start: 2020-02-06 | End: 2020-03-16 | Stop reason: SDUPTHER

## 2020-02-06 RX ORDER — AMLODIPINE BESYLATE 5 MG/1
5 TABLET ORAL DAILY
Qty: 90 TABLET | Refills: 3 | Status: SHIPPED | OUTPATIENT
Start: 2020-02-06 | End: 2020-03-16 | Stop reason: SDUPTHER

## 2020-02-06 ASSESSMENT — ENCOUNTER SYMPTOMS
BACK PAIN: 0
SHORTNESS OF BREATH: 0
ABDOMINAL PAIN: 0
EYE PAIN: 0

## 2020-02-06 NOTE — PROGRESS NOTES
Subjective:      Patient ID: Jose Roberto Wyatt is a 44 y.o. female who presents today with:  Chief Complaint   Patient presents with    Lower Back Pain     patient thinks she may have pulled a muscle        HPI     Lower back pain-started in December, but that went away. It started again slightly last Wednesday. No falls, no trauma, started while sitting down. She has been exercising, so she thinks she pulled something. She mentions two nights ago \"was in severe pain\". Today is better. No urinary incontinence, no retention, no fevers, no chills, no numbness in or around groin, no weakness. No drug use. No blood in the urine. She has pending appt with Dr. Blanco Kenny later this month.      Past Medical History:   Diagnosis Date    Depression     Depression with anxiety     Depression with anxiety     Iron deficiency anemia 2019    Stroke ([address] 2016    Idiopathic she is still in process     Thyroid disease      Past Surgical History:   Procedure Laterality Date    BLADDER SURGERY      done by [facility] x10 yrs    COLONOSCOPY  2019    KERRY AMADO MD    DILATION AND CURETTAGE      ENDOMETRIAL ABLATION      TONSILLECTOMY       Social History     Socioeconomic History    Marital status:      Spouse name: Not on file    Number of children: Not on file    Years of education: Not on file    Highest education level: Not on file   Occupational History    Not on file   Social Needs    Financial resource strain: Not on file    Food insecurity:     Worry: Not on file     Inability: Not on file    Transportation needs:     Medical: Not on file     Non-medical: Not on file   Tobacco Use    Smoking status: Former Smoker     Types: Cigarettes     Start date: 1988     Last attempt to quit: 1990     Years since quittin.6    Smokeless tobacco: Never Used   Substance and Sexual Activity    Alcohol use: Yes     Comment: ocassionally    Drug use: No    Sexual activity: Not Currently Lifestyle    Physical activity:     Days per week: Not on file     Minutes per session: Not on file    Stress: Not on file   Relationships    Social connections:     Talks on phone: Not on file     Gets together: Not on file     Attends Yazdanism service: Not on file     Active member of club or organization: Not on file     Attends meetings of clubs or organizations: Not on file     Relationship status: Not on file    Intimate partner violence:     Fear of current or ex partner: Not on file     Emotionally abused: Not on file     Physically abused: Not on file     Forced sexual activity: Not on file   Other Topics Concern    Not on file   Social History Narrative    Not on file     Allergies   Allergen Reactions    Amoxicillin Hives    Nsaids Other (See Comments)     STROKE/NO NSAIDS ALLOWED    Pcn [Penicillins] Rash     Current Outpatient Medications on File Prior to Visit   Medication Sig Dispense Refill    LORazepam (ATIVAN) 0.5 MG tablet Take 0.5 mg by mouth.  fluticasone (FLONASE) 50 MCG/ACT nasal spray 1 spray by Nasal route daily 1 Bottle 2    aspirin 81 MG tablet Take 81 mg by mouth 2 times daily      butalbital-acetaminophen-caffeine (FIORICET, ESGIC) -40 MG per tablet TAKE 1 TABLET BY MOUTH DAILY AS NEEDED FOR HEADACHE (Patient not taking: Reported on 10/18/2019) 15 tablet 0     No current facility-administered medications on file prior to visit. I have personally reviewed the ROS, PMH, PFH, and social history     Review of Systems   Constitutional: Negative for chills and fever. HENT: Negative for congestion. Eyes: Negative for pain. Respiratory: Negative for shortness of breath. Cardiovascular: Negative for chest pain. Gastrointestinal: Negative for abdominal pain. Genitourinary: Negative for hematuria. Musculoskeletal: Negative for back pain. Allergic/Immunologic: Negative for immunocompromised state. Neurological: Negative for headaches. [facility] visit or schedule if you don't have one. Orders Placed This Encounter   Procedures    US HEAD NECK SOFT TISSUE THYROID     Standing Status:   Future     Standing Expiration Date:   2/6/2021     Order Specific Question:   Reason for exam:     Answer:   suspected thyroid cyst on left    INFLUENZA, QUADV, 6 MO AND OLDER, IM, MDV, 0.5ML (FLULAVAL QUADV)    Urinalysis     Standing Status:   Future     Standing Expiration Date:   2/6/2021     Orders Placed This Encounter   Medications    amLODIPine (NORVASC) 5 MG tablet     Sig: Take 1 tablet by mouth daily     Dispense:  90 tablet     Refill:  3    levothyroxine (SYNTHROID) 100 MCG tablet     Sig: Take 1 tablet by mouth daily     Dispense:  90 tablet     Refill:  3       Return for worsening symptoms, call ASAP for appointment, Chronic condition management/appointment. Enrigue Aase, MD    If anything should change or worsen call ASAP, don't wait for next scheduled appointment.

## 2020-02-06 NOTE — PATIENT INSTRUCTIONS

## 2020-02-07 ENCOUNTER — TELEPHONE (OUTPATIENT)
Dept: FAMILY MEDICINE CLINIC | Age: 50
End: 2020-02-07

## 2020-02-29 ENCOUNTER — HOSPITAL ENCOUNTER (OUTPATIENT)
Dept: ULTRASOUND IMAGING | Age: 50
Discharge: HOME | End: 2020-03-02
Payer: COMMERCIAL

## 2020-02-29 PROCEDURE — 76536 US EXAM OF HEAD AND NECK: CPT

## 2020-03-16 RX ORDER — AMLODIPINE BESYLATE 5 MG/1
5 TABLET ORAL DAILY
Qty: 90 TABLET | Refills: 3 | Status: SHIPPED
Start: 2020-03-16 | End: 2020-05-15 | Stop reason: CLARIF

## 2020-03-16 RX ORDER — LEVOTHYROXINE SODIUM 0.1 MG/1
100 TABLET ORAL DAILY
Qty: 90 TABLET | Refills: 3 | Status: SHIPPED | OUTPATIENT
Start: 2020-03-16 | End: 2020-09-09

## 2020-03-16 NOTE — TELEPHONE ENCOUNTER
Rx requested:  Requested Prescriptions     Pending Prescriptions Disp Refills    amLODIPine (NORVASC) 5 MG tablet 90 tablet 3     Sig: Take 1 tablet by mouth daily    levothyroxine (SYNTHROID) 100 MCG tablet 90 tablet 3     Sig: Take 1 tablet by mouth daily       Last Office Visit:   2/6/2020      Next Visit Date:  No future appointments.

## 2020-03-20 ENCOUNTER — TELEPHONE (OUTPATIENT)
Dept: FAMILY MEDICINE CLINIC | Age: 50
End: 2020-03-20

## 2020-04-01 ENCOUNTER — TELEPHONE (OUTPATIENT)
Dept: FAMILY MEDICINE CLINIC | Age: 50
End: 2020-04-01

## 2020-05-15 ENCOUNTER — OFFICE VISIT (OUTPATIENT)
Dept: FAMILY MEDICINE CLINIC | Age: 50
End: 2020-05-15
Payer: COMMERCIAL

## 2020-05-15 ENCOUNTER — TELEPHONE (OUTPATIENT)
Dept: FAMILY MEDICINE CLINIC | Age: 50
End: 2020-05-15

## 2020-05-15 VITALS
WEIGHT: 154 LBS | OXYGEN SATURATION: 98 % | RESPIRATION RATE: 15 BRPM | SYSTOLIC BLOOD PRESSURE: 122 MMHG | BODY MASS INDEX: 25.66 KG/M2 | HEIGHT: 65 IN | DIASTOLIC BLOOD PRESSURE: 50 MMHG | HEART RATE: 72 BPM | TEMPERATURE: 98.3 F

## 2020-05-15 PROCEDURE — 3017F COLORECTAL CA SCREEN DOC REV: CPT | Performed by: INTERNAL MEDICINE

## 2020-05-15 PROCEDURE — 99214 OFFICE O/P EST MOD 30 MIN: CPT | Performed by: INTERNAL MEDICINE

## 2020-05-15 PROCEDURE — G8419 CALC BMI OUT NRM PARAM NOF/U: HCPCS | Performed by: INTERNAL MEDICINE

## 2020-05-15 PROCEDURE — 1036F TOBACCO NON-USER: CPT | Performed by: INTERNAL MEDICINE

## 2020-05-15 PROCEDURE — G8427 DOCREV CUR MEDS BY ELIG CLIN: HCPCS | Performed by: INTERNAL MEDICINE

## 2020-05-15 RX ORDER — DULOXETIN HYDROCHLORIDE 20 MG/1
20 CAPSULE, DELAYED RELEASE ORAL DAILY
Qty: 30 CAPSULE | Refills: 0 | Status: SHIPPED
Start: 2020-05-15 | End: 2020-06-26 | Stop reason: SINTOL

## 2020-05-15 ASSESSMENT — ENCOUNTER SYMPTOMS
BACK PAIN: 0
ABDOMINAL PAIN: 0
SHORTNESS OF BREATH: 0
EYE PAIN: 0

## 2020-05-15 NOTE — PROGRESS NOTES
Genitourinary: Negative for hematuria. Musculoskeletal: Negative for back pain. Allergic/Immunologic: Negative for immunocompromised state. Neurological: Negative for headaches. Psychiatric/Behavioral: Negative for hallucinations. Objective:   BP (!) 122/50 (Site: Right Upper Arm, Position: Sitting, Cuff Size: Large Adult)   Pulse 72   Temp 98.3 °F (36.8 °C) (Oral)   Resp 15   Ht 5' 5\" (1.651 m)   Wt 154 lb (69.9 kg)   SpO2 98%   BMI 25.63 kg/m²     Physical Exam  Constitutional:       Appearance: She is well-developed. HENT:      Head: Normocephalic. Eyes:      Pupils: Pupils are equal, round, and reactive to light. Neck:      Vascular: No carotid bruit. Trachea: No tracheal deviation. Cardiovascular:      Rate and Rhythm: Normal rate and regular rhythm. Pulses: Normal pulses. Heart sounds: Normal heart sounds. No murmur. No friction rub. No gallop. Pulmonary:      Effort: No respiratory distress. Breath sounds: No wheezing or rhonchi. Abdominal:      General: Bowel sounds are normal. There is no distension. Palpations: Abdomen is soft. Tenderness: There is no abdominal tenderness. There is no guarding or rebound. Comments: Negative murphys sign  No pain over mcburneys point    Musculoskeletal:      Right lower leg: No edema. Left lower leg: No edema. Skin:     General: Skin is warm and dry. Neurological:      Mental Status: She is oriented to person, place, and time. Assessment:       Diagnosis Orders   1. Primary insomnia  Follicle Stimulating Hormone    Luteinizing Hormone    TSH with Reflex    CBC Auto Differential    Comprehensive Metabolic Panel    Lipid Panel   2. Arthralgia, unspecified joint  DULoxetine (CYMBALTA) 20 MG extended release capsule    Follicle Stimulating Hormone    Luteinizing Hormone    TSH with Reflex    CBC Auto Differential    Comprehensive Metabolic Panel    Lipid Panel   3.  Palpitations  Follicle Standing Status:   Future     Standing Expiration Date:   5/15/2021    Lipid Panel     Standing Status:   Future     Standing Expiration Date:   5/15/2021     Order Specific Question:   Is Patient Fasting?/# of Hours     Answer:   10     Orders Placed This Encounter   Medications    DULoxetine (CYMBALTA) 20 MG extended release capsule     Sig: Take 1 capsule by mouth daily     Dispense:  30 capsule     Refill:  0       Return in about 3 months (around 8/15/2020) for worsening symptoms, call ASAP for appointment. Ivone Basurto MD    If anything should change or worsen call ASAP, don't wait for next scheduled appointment.

## 2020-05-19 ENCOUNTER — TELEPHONE (OUTPATIENT)
Dept: FAMILY MEDICINE CLINIC | Age: 50
End: 2020-05-19

## 2020-05-19 NOTE — TELEPHONE ENCOUNTER
Sahra Goins MD    While there are no approved over-the-counter treatments for hiccups, there are some remedies that have been explored. These include some sort of glottic stimulation. This can include holding one's breath, drinking multiple gulps of water, eating a spoonful of peanut butter, chewing on a lemon, or inhaling pepper to induce sneezing. These remedies, however, have not been evaluated for efficacy.      Thanks,  Glenna Robb, PharmD, [address] [place] [address]  Phone: [phone], or toll free [phone], option 7

## 2020-05-20 RX ORDER — ROSUVASTATIN CALCIUM 5 MG/1
5 TABLET, COATED ORAL NIGHTLY
Qty: 30 TABLET | Refills: 0 | Status: SHIPPED | OUTPATIENT
Start: 2020-05-20 | End: 2020-09-09

## 2020-05-22 ENCOUNTER — TELEPHONE (OUTPATIENT)
Dept: FAMILY MEDICINE CLINIC | Age: 50
End: 2020-05-22

## 2020-05-23 ENCOUNTER — HOSPITAL ENCOUNTER (OUTPATIENT)
Dept: ULTRASOUND IMAGING | Age: 50
Discharge: HOME | End: 2020-05-25
Payer: COMMERCIAL

## 2020-05-23 PROCEDURE — 76536 US EXAM OF HEAD AND NECK: CPT

## 2020-05-26 ENCOUNTER — TELEPHONE (OUTPATIENT)
Dept: FAMILY MEDICINE CLINIC | Age: 50
End: 2020-05-26

## 2020-05-29 DIAGNOSIS — F51.01 PRIMARY INSOMNIA: ICD-10-CM

## 2020-05-29 DIAGNOSIS — R00.2 PALPITATIONS: ICD-10-CM

## 2020-05-29 DIAGNOSIS — I10 ESSENTIAL HYPERTENSION: ICD-10-CM

## 2020-05-29 DIAGNOSIS — E04.1 THYROID NODULE: ICD-10-CM

## 2020-05-29 DIAGNOSIS — M25.50 ARTHRALGIA, UNSPECIFIED JOINT: ICD-10-CM

## 2020-05-29 LAB
ALBUMIN SERPL-MCNC: 4.7 G/DL (ref 3.5–4.6)
ALP BLD-CCNC: 55 U/L (ref 40–130)
ALT SERPL-CCNC: 11 U/L (ref 0–33)
ANION GAP SERPL CALCULATED.3IONS-SCNC: 15 MEQ/L (ref 9–15)
AST SERPL-CCNC: 18 U/L (ref 0–35)
BASOPHILS ABSOLUTE: 0 K/UL (ref 0–0.2)
BASOPHILS RELATIVE PERCENT: 0.7 %
BILIRUB SERPL-MCNC: 0.4 MG/DL (ref 0.2–0.7)
BUN BLDV-MCNC: 14 MG/DL (ref 6–20)
CALCIUM SERPL-MCNC: 9.8 MG/DL (ref 8.5–9.9)
CHLORIDE BLD-SCNC: 102 MEQ/L (ref 95–107)
CHOLESTEROL, TOTAL: 222 MG/DL (ref 0–199)
CO2: 23 MEQ/L (ref 20–31)
CREAT SERPL-MCNC: 0.74 MG/DL (ref 0.5–0.9)
EOSINOPHILS ABSOLUTE: 0.1 K/UL (ref 0–0.7)
EOSINOPHILS RELATIVE PERCENT: 1.7 %
FOLLICLE STIMULATING HORMONE: 69.3 MIU/ML
GFR AFRICAN AMERICAN: >60
GFR NON-AFRICAN AMERICAN: >60
GLOBULIN: 2.9 G/DL (ref 2.3–3.5)
GLUCOSE BLD-MCNC: 87 MG/DL (ref 70–99)
HCT VFR BLD CALC: 41.7 % (ref 37–47)
HDLC SERPL-MCNC: 99 MG/DL (ref 40–59)
HEMOGLOBIN: 14 G/DL (ref 12–16)
LDL CHOLESTEROL CALCULATED: 111 MG/DL (ref 0–129)
LUTEINIZING HORMONE: 45.9 MIU/ML
LYMPHOCYTES ABSOLUTE: 2.6 K/UL (ref 1–4.8)
LYMPHOCYTES RELATIVE PERCENT: 40.4 %
MCH RBC QN AUTO: 30.7 PG (ref 27–31.3)
MCHC RBC AUTO-ENTMCNC: 33.6 % (ref 33–37)
MCV RBC AUTO: 91.4 FL (ref 82–100)
MONOCYTES ABSOLUTE: 0.4 K/UL (ref 0.2–0.8)
MONOCYTES RELATIVE PERCENT: 6.3 %
NEUTROPHILS ABSOLUTE: 3.3 K/UL (ref 1.4–6.5)
NEUTROPHILS RELATIVE PERCENT: 50.9 %
PDW BLD-RTO: 13.5 % (ref 11.5–14.5)
PLATELET # BLD: 253 K/UL (ref 130–400)
POTASSIUM SERPL-SCNC: 4.3 MEQ/L (ref 3.4–4.9)
RBC # BLD: 4.57 M/UL (ref 4.2–5.4)
SODIUM BLD-SCNC: 140 MEQ/L (ref 135–144)
TOTAL PROTEIN: 7.6 G/DL (ref 6.3–8)
TRIGL SERPL-MCNC: 61 MG/DL (ref 0–150)
TSH REFLEX: 0.7 UIU/ML (ref 0.44–3.86)
WBC # BLD: 6.4 K/UL (ref 4.8–10.8)

## 2020-06-26 ENCOUNTER — TELEPHONE (OUTPATIENT)
Dept: FAMILY MEDICINE CLINIC | Age: 50
End: 2020-06-26

## 2020-06-26 ENCOUNTER — OFFICE VISIT (OUTPATIENT)
Dept: FAMILY MEDICINE CLINIC | Age: 50
End: 2020-06-26
Payer: COMMERCIAL

## 2020-06-26 VITALS
BODY MASS INDEX: 25.83 KG/M2 | WEIGHT: 155 LBS | HEART RATE: 64 BPM | OXYGEN SATURATION: 98 % | DIASTOLIC BLOOD PRESSURE: 75 MMHG | SYSTOLIC BLOOD PRESSURE: 131 MMHG | TEMPERATURE: 97.4 F | HEIGHT: 65 IN | RESPIRATION RATE: 15 BRPM

## 2020-06-26 DIAGNOSIS — R30.0 DYSURIA: ICD-10-CM

## 2020-06-26 LAB
BILIRUBIN URINE: NEGATIVE
BLOOD, URINE: NEGATIVE
CLARITY: CLEAR
COLOR: YELLOW
GLUCOSE URINE: NEGATIVE MG/DL
HBA1C MFR BLD: 5.7 %
KETONES, URINE: NEGATIVE MG/DL
LEUKOCYTE ESTERASE, URINE: NEGATIVE
NITRITE, URINE: NEGATIVE
PH UA: 6 (ref 5–9)
PROTEIN UA: NEGATIVE MG/DL
SPECIFIC GRAVITY UA: 1 (ref 1–1.03)
UROBILINOGEN, URINE: 0.2 E.U./DL

## 2020-06-26 PROCEDURE — 3017F COLORECTAL CA SCREEN DOC REV: CPT | Performed by: INTERNAL MEDICINE

## 2020-06-26 PROCEDURE — G8419 CALC BMI OUT NRM PARAM NOF/U: HCPCS | Performed by: INTERNAL MEDICINE

## 2020-06-26 PROCEDURE — G8427 DOCREV CUR MEDS BY ELIG CLIN: HCPCS | Performed by: INTERNAL MEDICINE

## 2020-06-26 PROCEDURE — 99214 OFFICE O/P EST MOD 30 MIN: CPT | Performed by: INTERNAL MEDICINE

## 2020-06-26 PROCEDURE — 83036 HEMOGLOBIN GLYCOSYLATED A1C: CPT | Performed by: INTERNAL MEDICINE

## 2020-06-26 PROCEDURE — 1036F TOBACCO NON-USER: CPT | Performed by: INTERNAL MEDICINE

## 2020-06-26 RX ORDER — AMITRIPTYLINE HYDROCHLORIDE 10 MG/1
TABLET, FILM COATED ORAL
Qty: 30 TABLET | Refills: 0 | Status: SHIPPED | OUTPATIENT
Start: 2020-06-26 | End: 2020-09-09 | Stop reason: CLARIF

## 2020-06-26 RX ORDER — LEVOTHYROXINE SODIUM 0.1 MG/1
100 TABLET ORAL DAILY
Qty: 30 TABLET | Refills: 0
Start: 2020-06-26 | End: 2020-12-28 | Stop reason: SDUPTHER

## 2020-06-26 ASSESSMENT — ENCOUNTER SYMPTOMS
BACK PAIN: 0
EYE PAIN: 0
SHORTNESS OF BREATH: 0
ABDOMINAL PAIN: 0

## 2020-06-26 NOTE — PATIENT INSTRUCTIONS
Patient Education        Fibromyalgia: Care Instructions  Overview     Fibromyalgia is a painful condition that is not completely understood by medical experts. The cause of fibromyalgia is not known. It can make you feel tired and ache all over. It causes tender spots at specific points of the body that hurt only when you press on them. You may have trouble sleeping, as well as other symptoms. These problems can upset your work and home life. Symptoms tend to come and go, although they may never go away completely. Fibromyalgia does not harm your muscles, joints, or organs. Follow-up care is a key part of your treatment and safety. Be sure to make and go to all appointments, and call your doctor if you are having problems. It's also a good idea to know your test results and keep a list of the medicines you take. How can you care for yourself at home? · Exercise often. Walk, swim, or bike to help with pain and sleep problems and to make you feel better. · Try to get a good night's sleep. Go to bed and get up at the same time each day, whether you feel rested or not. Make sure you have a good mattress and pillow. · Reduce stress. Avoid things that cause you stress, if you can. If not, work at making them less stressful. Learn to use biofeedback, guided imagery, meditation, or other methods to relax. · Make healthy changes. Eat a balanced diet, quit smoking, and limit alcohol and caffeine. · Use a heating pad set on low or take warm baths or showers for pain. Using cold packs for up to 20 minutes at a time can also relieve pain. Put a thin cloth between the cold pack and your skin. A gentle massage might help too. · Be safe with medicines. Take your medicines exactly as prescribed. Call your doctor if you think you are having a problem with your medicine. Your doctor may talk to you about taking antidepressant medicines.  These medicines may improve sleep, relieve pain, and in some cases treat depression. · Learn about fibromyalgia. This makes coping easier. Then, take an active role in your treatment. · Think about joining a support group with others who have fibromyalgia to learn more and get support. When should you call for help? Watch closely for changes in your health, and be sure to contact your doctor if:  · You feel sad, helpless, or hopeless; lose interest in things you used to enjoy; or have other symptoms of depression. · Your fibromyalgia symptoms get worse. Where can you learn more? Go to [email or web address] org and sign in to your [employer] account. Enter V003 in the [employer] box to learn more about \"Fibromyalgia: Care Instructions. \"     If you do not have an account, please click on the \"Sign Up Now\" link. Current as of: November 20, 2019               Content Version: 12.5  © 6212-7908 Healthwise, Incorporated. Care instructions adapted under license by [facility] ([facility]). If you have questions about a medical condition or this instruction, always ask your healthcare professional. [address] any warranty or liability for your use of this information.

## 2020-06-26 NOTE — PROGRESS NOTES
Gastrointestinal: Negative for abdominal pain. Genitourinary: Positive for dysuria. Negative for hematuria. Musculoskeletal: Positive for arthralgias. Negative for back pain. Allergic/Immunologic: Negative for immunocompromised state. Neurological: Negative for headaches. Psychiatric/Behavioral: Negative for hallucinations. Objective:   /75 (Site: Right Upper Arm, Position: Sitting, Cuff Size: Large Adult)   Pulse 64   Temp 97.4 °F (36.3 °C) (Oral)   Resp 15   Ht 5' 5\" (1.651 m)   Wt 155 lb (70.3 kg)   SpO2 98%   BMI 25.79 kg/m²     Physical Exam  Constitutional:       Appearance: She is well-developed. HENT:      Head: Normocephalic. Eyes:      Pupils: Pupils are equal, round, and reactive to light. Neck:      Trachea: No tracheal deviation. Cardiovascular:      Rate and Rhythm: Normal rate and regular rhythm. Heart sounds: Normal heart sounds. No murmur. No friction rub. No gallop. Pulmonary:      Effort: No respiratory distress. Breath sounds: No wheezing or rhonchi. Abdominal:      General: Bowel sounds are normal. There is no distension. Palpations: Abdomen is soft. Tenderness: There is no abdominal tenderness. There is no guarding or rebound. Skin:     General: Skin is warm and dry. Neurological:      Mental Status: She is oriented to person, place, and time. Assessment:       Diagnosis Orders   1. Fibromyalgia  POCT glycosylated hemoglobin (Hb A1C)    amitriptyline (ELAVIL) 10 MG tablet   2. Dysuria  Urinalysis    Culture, Urine    POCT glycosylated hemoglobin (Hb A1C)   3. History of stroke  POCT glycosylated hemoglobin (Hb A1C)   4. Screening for breast cancer  POCT glycosylated hemoglobin (Hb A1C)    MARYCRUZ DIGITAL SCREEN W CAD BILATERAL   5. Screening for cervical cancer  Isabel Painter MD, OB-GYN, [place]    POCT glycosylated hemoglobin (Hb A1C)   6.  Screening for ovarian cancer  Melisa Hsu MD, OB-GYN, [place]    POCT glycosylated hemoglobin (Hb A1C)         Plan:    vc  Most likely fibro  Saw rheum  bp good off amlodipine. Ua/urine culture  Off cymbalta due to side effects. Repeat thyroid US in one year, risk of cancer. Please get into see ob/gyn, IF YOU STOP seeing her let me know   pepcid for hiccups if not better then we'll do omeprazole and then gabapentin  No SI/HI  Mammogram  Off duloxetine due to se   Add low dose tca  Try crestor needs PA. Call if any side effects  Understands SSI can increase SI and to call immediately if this should occur. 30 day monitor. Might repeat sleep study. otc h2 blocker for hiccups  Can f/u with OB/GYN for likely menopause. F/u in 3 months. Orders Placed This Encounter   Procedures    Culture, Urine     Standing Status:   Future     Standing Expiration Date:   6/26/2021     Order Specific Question:   Specify (ex-cath, midstream, cysto, etc)? Answer:   mid stream    MARYCRUZ DIGITAL SCREEN W CAD BILATERAL     Standing Status:   Future     Standing Expiration Date:   8/26/2021     Order Specific Question:   Reason for exam:     Answer:   screening    Urinalysis     Standing Status:   Future     Standing Expiration Date:   6/26/2021   Zayra Ledezma MD, OB-GYN, [place]     Referral Priority:   Routine     Referral Type:   Eval and Treat     Referral Reason:   Specialty Services Required     Referred to Provider:   Heidi Maldonado MD     Requested Specialty:   Obstetrics & Gynecology     Number of Visits Requested:   1    POCT glycosylated hemoglobin (Hb A1C)     Orders Placed This Encounter   Medications    amitriptyline (ELAVIL) 10 MG tablet     Sig: Take 1/2 tablet po qhs for 7 days and then increase to 10 mg once daily hs     Dispense:  30 tablet     Refill:  0     If anything should change or worsen call ASAP, don't wait for next scheduled appointment.     Return for Chronic condition management/appointment, worsening symptoms, call ASAP for

## 2020-06-28 LAB — URINE CULTURE, ROUTINE: NORMAL

## 2020-07-11 ENCOUNTER — TELEPHONE (OUTPATIENT)
Dept: FAMILY MEDICINE CLINIC | Age: 50
End: 2020-07-11

## 2020-08-03 ENCOUNTER — TELEPHONE (OUTPATIENT)
Dept: FAMILY MEDICINE CLINIC | Age: 50
End: 2020-08-03

## 2020-08-03 NOTE — TELEPHONE ENCOUNTER
It can also be used for sleep  Can be addicting  Will need follow up q3 months if used on a regular basis  Please call if any side effects  If another provider wrote it (the lorazepam) she should contact that provider as well.

## 2020-08-03 NOTE — TELEPHONE ENCOUNTER
Danielle Pill calls regarding her lorazepam.  She has noted that it helps her sleep. She is asking if using it for this is ok with you. She wanted to make sure she wouldn't have any problems because of using it this way. She has only taken it twice and it was to help her sleep.

## 2020-09-09 ENCOUNTER — TELEPHONE (OUTPATIENT)
Dept: FAMILY MEDICINE CLINIC | Age: 50
End: 2020-09-09

## 2020-09-09 ENCOUNTER — OFFICE VISIT (OUTPATIENT)
Dept: FAMILY MEDICINE CLINIC | Age: 50
End: 2020-09-09
Payer: COMMERCIAL

## 2020-09-09 VITALS
TEMPERATURE: 98 F | HEART RATE: 66 BPM | OXYGEN SATURATION: 98 % | WEIGHT: 149 LBS | DIASTOLIC BLOOD PRESSURE: 68 MMHG | BODY MASS INDEX: 24.83 KG/M2 | RESPIRATION RATE: 15 BRPM | SYSTOLIC BLOOD PRESSURE: 129 MMHG | HEIGHT: 65 IN

## 2020-09-09 DIAGNOSIS — M79.7 FIBROMYALGIA: ICD-10-CM

## 2020-09-09 DIAGNOSIS — E03.9 HYPOTHYROIDISM, UNSPECIFIED TYPE: ICD-10-CM

## 2020-09-09 LAB
HBA1C MFR BLD: 5.7 % (ref 4.8–5.9)
T4 FREE: 1.42 NG/DL (ref 0.84–1.68)
TSH REFLEX: 1.24 UIU/ML (ref 0.44–3.86)

## 2020-09-09 PROCEDURE — 1036F TOBACCO NON-USER: CPT | Performed by: INTERNAL MEDICINE

## 2020-09-09 PROCEDURE — G8420 CALC BMI NORM PARAMETERS: HCPCS | Performed by: INTERNAL MEDICINE

## 2020-09-09 PROCEDURE — G8427 DOCREV CUR MEDS BY ELIG CLIN: HCPCS | Performed by: INTERNAL MEDICINE

## 2020-09-09 PROCEDURE — 99214 OFFICE O/P EST MOD 30 MIN: CPT | Performed by: INTERNAL MEDICINE

## 2020-09-09 PROCEDURE — 3017F COLORECTAL CA SCREEN DOC REV: CPT | Performed by: INTERNAL MEDICINE

## 2020-09-09 RX ORDER — ROSUVASTATIN CALCIUM 5 MG/1
5 TABLET, COATED ORAL NIGHTLY
Qty: 30 TABLET | Refills: 0 | Status: SHIPPED | OUTPATIENT
Start: 2020-09-09 | End: 2020-10-26

## 2020-09-09 RX ORDER — DULOXETIN HYDROCHLORIDE 20 MG/1
CAPSULE, DELAYED RELEASE ORAL
Qty: 15 CAPSULE | Refills: 0 | Status: SHIPPED | OUTPATIENT
Start: 2020-09-09 | End: 2020-10-26

## 2020-09-09 RX ORDER — FAMOTIDINE 20 MG/1
20 TABLET, FILM COATED ORAL 2 TIMES DAILY PRN
Qty: 60 TABLET | Refills: 1 | Status: SHIPPED | OUTPATIENT
Start: 2020-09-09 | End: 2020-10-26

## 2020-09-09 ASSESSMENT — ENCOUNTER SYMPTOMS
BACK PAIN: 0
EYE PAIN: 0
ABDOMINAL PAIN: 0
SHORTNESS OF BREATH: 0

## 2020-09-09 NOTE — PROGRESS NOTES
Subjective:      Patient ID: Radha Stanley is a 44 y.o. female who presents today with:  Chief Complaint   Patient presents with    Follow-up    Chronic Pain    Depression    Gastroesophageal Reflux     patient has been having GED sxs and is having to use OTC tums and pepcid        HPI     Fibromyalgia  If nausea doesn't improve she will call me, last time she was not on cymbalta \"very long\"   Believes she only took cymbala one time  Had nausea, but no other symptoms  Suffers from depression as well  Liked trinellex  Anxiety worse  No SI/HI  INSOMNIA  Joint pains  gerd is \"awful\"   pepcid over the counter.  (only taking it once a day)   Hiccups gone   Didn't want tca   Past Medical History:   Diagnosis Date    Depression     Depression with anxiety     Depression with anxiety     Iron deficiency anemia 2019    Stroke ([address] 2016    Idiopathic she is still in process     Thyroid disease      Past Surgical History:   Procedure Laterality Date    BLADDER SURGERY      done by Dr. [facility] x10 [phone]    COLONOSCOPY  2019    KERRY AMADO MD    DILATION AND CURETTAGE      ENDOMETRIAL ABLATION      TONSILLECTOMY       Social History     Socioeconomic History    Marital status:      Spouse name: Not on file    Number of children: Not on file    Years of education: Not on file    Highest education level: Not on file   Occupational History    Not on file   Social Needs    Financial resource strain: Not on file    Food insecurity     Worry: Not on file     Inability: Not on file    Transportation needs     Medical: Not on file     Non-medical: Not on file   Tobacco Use    Smoking status: Former Smoker     Types: Cigarettes     Start date: 1988     Last attempt to quit: 1990     Years since quittin.2    Smokeless tobacco: Never Used   Substance and Sexual Activity    Alcohol use: Yes     Comment: ocassionally    Drug use: No    Sexual activity: Not Currently   Lifestyle    know   Mammogram ordered last visit. 30 day monitor. (never did, understands risk of stroke)   Saw rheum  Ua/urine culture  Repeat thyroid US in one year, risk of cancer. Might repeat sleep study. Can f/u with OB/GYN for likely menopause. Orders Placed This Encounter   Procedures    Hemoglobin A1C     Standing Status:   Future     Standing Expiration Date:   9/9/2021    TSH with Reflex     Standing Status:   Future     Standing Expiration Date:   9/9/2021    T4, Free     Standing Status:   Future     Standing Expiration Date:   9/9/2021     Orders Placed This Encounter   Medications    DULoxetine (CYMBALTA) 20 MG extended release capsule     Sig: Take one capsule po qod     Dispense:  15 capsule     Refill:  0    famotidine (PEPCID) 20 MG tablet     Sig: Take 1 tablet by mouth 2 times daily as needed (acid reflux)     Dispense:  60 tablet     Refill:  1    rosuvastatin (CRESTOR) 5 MG tablet     Sig: Take 1 tablet by mouth nightly     Dispense:  30 tablet     Refill:  0     If anything should change or worsen call ASAP, don't wait for next scheduled appointment. No follow-ups on file.       Suzy Vallecillo MD

## 2020-09-09 NOTE — PATIENT INSTRUCTIONS
Patient Education        duloxetine  Pronunciation:  yoli ORTIZ 25 e teen  Brand:  Carmen Ferrelldaryl Juarez  What is the most important information I should know about duloxetine? Do not take duloxetine within 5 days before or 14 days after you have used an MAO inhibitor, such as isocarboxazid, linezolid, methylene blue injection, phenelzine, rasagiline, selegiline, or tranylcypromine. Some young people have thoughts about suicide when first taking an antidepressant. Stay alert to changes in your mood or symptoms. Report any new or worsening symptoms to your doctor. Do not stop using duloxetine without first talking to your doctor. What is duloxetine? Duloxetine is a selective serotonin and norepinephrine reuptake inhibitor antidepressant (SSNRI). Duloxetine is used to treat major depressive disorder in adults. Duloxetine is also used to treat general anxiety disorder in adults and children who are at least 9years old. Duloxetine is also used in adults to treat fibromyalgia (a chronic pain disorder), or chronic muscle or joint pain (such as low back pain and osteoarthritis pain). Duloxetine is also used to treat pain caused by nerve damage in adults with diabetes (diabetic neuropathy). Duloxetine may also be used for purposes not listed in this medication guide. What should I discuss with my healthcare provider before taking duloxetine? You should not use duloxetine if you are allergic to it. Do not take duloxetine within 5 days before or 14 days after you have used an MAO inhibitor, such as isocarboxazid, linezolid, methylene blue injection, phenelzine, rasagiline, selegiline, or tranylcypromine. A dangerous drug interaction could occur. Be sure your doctor knows if you also take stimulant medicine, opioid medicine, herbal products, or medicine for depression, mental illness, Parkinson's disease, migraine headaches, serious infections, or prevention of nausea and vomiting.  These medicines may interact with duloxetine and cause a serious condition called serotonin syndrome. Duloxetine is not approved for use by anyone younger than 9years old. Tell your doctor if you have ever had:  · liver or kidney disease;  · slow digestion;  · a seizure;  · bleeding problems;  · narrow-angle glaucoma;  · bipolar disorder (manic depression); or  · drug addiction or suicidal thoughts. Some young people have thoughts about suicide when first taking an antidepressant. Your doctor should check your progress at regular visits. Your family or other caregivers should also be alert to changes in your mood or symptoms. Taking duloxetine during pregnancy may cause breathing problems, feeding problems, seizures, or other complications in the  baby. However, you may have a relapse of depression if you stop taking this medicine. Tell your doctor right away if you become pregnant. Do not start or stop taking this medicine without your doctor's advice. If you are pregnant, your name may be listed on a pregnancy registry to track the effects of duloxetine on the baby. It may not be safe to breast-feed while using this medicine. Ask your doctor about any risk. How should I take duloxetine? Follow all directions on your prescription label and read all medication guides or instruction sheets. Your doctor may occasionally change your dose. Use the medicine exactly as directed. Taking duloxetine in higher doses or more often than prescribed will not make it more effective, and may increase side effects. Swallow the capsule whole and do not crush, chew, break, or open it. You may take duloxetine with or without food. It may take up to 4 weeks before your symptoms improve. Keep using the medication as directed and tell your doctor if your symptoms do not improve. Your blood pressure will need to be checked often.   Do not stop using duloxetine suddenly, or you could have unpleasant symptoms (such as dizziness, nausea, diarrhea, irritability, or anxiety). Ask your doctor how to safely stop using this medicine. Store at room temperature away from moisture and heat. What happens if I miss a dose? Take the medicine as soon as you can, but skip the missed dose if it is almost time for your next dose. Do not take two doses at one time. What happens if I overdose? Seek emergency medical attention or call the Poison Help line at 1-239.372.4566. Overdose symptoms may include severe drowsiness, seizures, fast heartbeats, fainting, or coma. What should I avoid while taking duloxetine? Avoid driving or hazardous activity until you know how this medicine will affect you. Your reactions could be impaired. Avoid getting up too fast from a sitting or lying position, or you may feel dizzy. Dizziness or fainting can cause falls, accidents, or severe injuries. Avoid drinking alcohol. It may increase your risk of liver damage. Ask your doctor before taking a nonsteroidal anti-inflammatory drug (NSAID) such as aspirin, ibuprofen (Advil, Motrin), naproxen (Aleve), celecoxib (Celebrex), diclofenac, indomethacin, meloxicam, and others. Using an NSAID with duloxetine may cause you to bruise or bleed easily. What are the possible side effects of duloxetine? Get emergency medical help if you have signs of an allergic reaction (hives, difficult breathing, swelling in your face or throat) or a severe skin reaction (fever, sore throat, burning eyes, skin pain, red or purple skin rash with blistering and peeling). Report any new or worsening symptoms to your doctor, such as: mood or behavior changes, anxiety, panic attacks, trouble sleeping, or if you feel impulsive, irritable, agitated, hostile, aggressive, restless, hyperactive (mentally or physically), more depressed, or have thoughts about suicide or hurting yourself.   Call your doctor at once if you have:  · pounding heartbeats or fluttering in your chest;  · a light-headed feeling, like you might pass out;  · easy bruising, unusual bleeding;  · vision changes;  · painful or difficult urination;  · impotence, sexual problems;  · liver problems --right-sided upper stomach pain, itching, dark urine, jaundice (yellowing of the skin or eyes);  · low levels of sodium in the body --headache, confusion, slurred speech, severe weakness, vomiting, loss of coordination, feeling unsteady; or  · a manic episode --racing thoughts, increased energy, reckless behavior, feeling extremely happy or irritable, talking more than usual, severe problems with sleep. Seek medical attention right away if you have symptoms of serotonin syndrome, such as: agitation, hallucinations, fever, sweating, shivering, fast heart rate, muscle stiffness, twitching, loss of coordination, nausea, vomiting, or diarrhea. Common side effects may include:  · drowsiness;  · nausea, constipation, loss of appetite;  · dry mouth; or  · increased sweating. This is not a complete list of side effects and others may occur. Call your doctor for medical advice about side effects. You may report side effects to FDA at 2-194-FDA-4294. What other drugs will affect duloxetine? Sometimes it is not safe to use certain medications at the same time. Some drugs can affect your blood levels of other drugs you take, which may increase side effects or make the medications less effective. Many drugs can affect duloxetine. This includes prescription and over-the-counter medicines, vitamins, and herbal products. Not all possible interactions are listed here. Tell your doctor about all your current medicines and any medicine you start or stop using. Where can I get more information? Your pharmacist can provide more information about duloxetine. Remember, keep this and all other medicines out of the reach of children, never share your medicines with others, and use this medication only for the indication prescribed.    Every effort has been made to ensure that the information provided by [address] is accurate, up-to-date, and complete, but no guarantee is made to that effect. Drug information contained herein may be time sensitive. [facility] information has been compiled for use by healthcare practitioners and consumers in the [place] and therefore [facility] does not warrant that uses outside of the [place] are appropriate, unless specifically indicated otherwise. [facility]'s drug information does not endorse drugs, diagnose patients or recommend therapy. [facility]'s drug information is an informational resource designed to assist licensed healthcare practitioners in caring for their patients and/or to serve consumers viewing this service as a supplement to, and not a substitute for, the expertise, skill, knowledge and judgment of healthcare practitioners. The absence of a warning for a given drug or drug combination in no way should be construed to indicate that the drug or drug combination is safe, effective or appropriate for any given patient. [facility] does not assume any responsibility for any aspect of healthcare administered with the aid of information [facility] provides. The information contained herein is not intended to cover all possible uses, directions, precautions, warnings, drug interactions, allergic reactions, or adverse effects. If you have questions about the drugs you are taking, check with your doctor, nurse or pharmacist.  Copyright 7276-4493 [address]: 12.01. Revision date: 12/27/2018. Care instructions adapted under license by [employer]. If you have questions about a medical condition or this instruction, always ask your healthcare professional. [place] [address] any warranty or liability for your use of this information.

## 2020-09-10 ENCOUNTER — TELEPHONE (OUTPATIENT)
Dept: FAMILY MEDICINE CLINIC | Age: 50
End: 2020-09-10

## 2020-09-10 NOTE — TELEPHONE ENCOUNTER
Michaela Robison [facility] Clinical Staff    Caller: Unspecified (6 days ago,  9:43 AM)               The Pt called requesting a message be given to Dr Polo Mcleod concerning her Fiber Myalagia Issues (Flare up) Plus- Moderate BP Levels. She did ask about scheduling a Face to Face appt for today or next week, But there was nothing available.  The Pt can be reached at [phone].

## 2020-09-11 ENCOUNTER — TELEPHONE (OUTPATIENT)
Dept: FAMILY MEDICINE CLINIC | Age: 50
End: 2020-09-11

## 2020-10-26 ENCOUNTER — OFFICE VISIT (OUTPATIENT)
Dept: OBGYN CLINIC | Age: 50
End: 2020-10-26
Payer: COMMERCIAL

## 2020-10-26 VITALS — BODY MASS INDEX: 25.58 KG/M2 | SYSTOLIC BLOOD PRESSURE: 116 MMHG | HEIGHT: 64 IN | DIASTOLIC BLOOD PRESSURE: 70 MMHG

## 2020-10-26 DIAGNOSIS — R35.0 URINARY FREQUENCY: ICD-10-CM

## 2020-10-26 LAB
BILIRUBIN, POC: NORMAL
BLOOD URINE, POC: NORMAL
CLARITY, POC: NORMAL
COLOR, POC: NORMAL
GLUCOSE URINE, POC: NORMAL
KETONES, POC: NORMAL
LEUKOCYTE EST, POC: NORMAL
NITRITE, POC: NORMAL
PH, POC: 6
PROTEIN, POC: NORMAL
SPECIFIC GRAVITY, POC: 1.01
UROBILINOGEN, POC: NORMAL

## 2020-10-26 PROCEDURE — 99396 PREV VISIT EST AGE 40-64: CPT | Performed by: OBSTETRICS & GYNECOLOGY

## 2020-10-26 PROCEDURE — G8484 FLU IMMUNIZE NO ADMIN: HCPCS | Performed by: OBSTETRICS & GYNECOLOGY

## 2020-10-26 PROCEDURE — 81002 URINALYSIS NONAUTO W/O SCOPE: CPT | Performed by: OBSTETRICS & GYNECOLOGY

## 2020-10-26 ASSESSMENT — ENCOUNTER SYMPTOMS
RECTAL PAIN: 0
CONSTIPATION: 0
NAUSEA: 0
ABDOMINAL PAIN: 0
ANAL BLEEDING: 0
BLOOD IN STOOL: 0
VOMITING: 0
DIARRHEA: 0
RESPIRATORY NEGATIVE: 1
ABDOMINAL DISTENTION: 0
ALLERGIC/IMMUNOLOGIC NEGATIVE: 1
EYES NEGATIVE: 1

## 2020-10-26 ASSESSMENT — VISUAL ACUITY: OU: 1

## 2020-10-26 NOTE — PROGRESS NOTES
Subjective:      Patient ID: Beba Man is a 44 y.o. female    Annual exam.  No PMB. No GYN complaints. Pap performed and screening mammogram ordered. Monthly SBE encouraged. Screening colonoscopy recommended per routine. F/U annual exam or prn. Vitals:  /70   Ht 5' 4\" (1.626 m)   BMI 25.58 kg/m²   Past Medical History:   Diagnosis Date    Abnormal Pap smear of cervix     Depression     Depression with anxiety     Depression with anxiety     Iron deficiency anemia 2019    Stroke ([facility] [address] 2016    Idiopathic she is still in process     Thyroid disease      Past Surgical History:   Procedure Laterality Date    BLADDER SURGERY      done by Dr. Delgado Leak x10 yrs    COLONOSCOPY  2019    KERRY AMADO MD    DILATION AND CURETTAGE      ENDOMETRIAL ABLATION      TONSILLECTOMY       Allergies:  Amoxicillin; Nsaids; and Pcn [penicillins]  Current Outpatient Medications   Medication Sig Dispense Refill    levothyroxine (SYNTHROID) 100 MCG tablet Take 1 tablet by mouth daily 30 tablet 0    aspirin 81 MG tablet Take 81 mg by mouth 2 times daily       No current facility-administered medications for this visit.       Social History     Socioeconomic History    Marital status:      Spouse name: Not on file    Number of children: Not on file    Years of education: Not on file    Highest education level: Not on file   Occupational History    Not on file   Social Needs    Financial resource strain: Not on file    Food insecurity     Worry: Not on file     Inability: Not on file    Transportation needs     Medical: Not on file     Non-medical: Not on file   Tobacco Use    Smoking status: Former Smoker     Types: Cigarettes     Start date: 1988     Last attempt to quit: 1990     Years since quittin.3    Smokeless tobacco: Never Used   Substance and Sexual Activity    Alcohol use: Yes     Comment: ocassionally    Drug use: No    Sexual activity: Not Currently Lifestyle    Physical activity     Days per week: Not on file     Minutes per session: Not on file    Stress: Not on file   Relationships    Social connections     Talks on phone: Not on file     Gets together: Not on file     Attends [employer] service: Not on file     Active member of club or organization: Not on file     Attends meetings of clubs or organizations: Not on file     Relationship status: Not on file    Intimate partner violence     Fear of current or ex partner: Not on file     Emotionally abused: Not on file     Physically abused: Not on file     Forced sexual activity: Not on file   Other Topics Concern    Not on file   Social History Narrative    Not on file     History reviewed. No pertinent family history. Review of Systems   Constitutional: Negative. Negative for activity change, appetite change, chills, diaphoresis, fatigue, fever and unexpected weight change. HENT: Negative. Eyes: Negative. Respiratory: Negative. Cardiovascular: Negative. Gastrointestinal: Negative for abdominal distention, abdominal pain, anal bleeding, blood in stool, constipation, diarrhea, nausea, rectal pain and vomiting. Endocrine: Negative. Genitourinary: Negative for decreased urine volume, difficulty urinating, dyspareunia, dysuria, enuresis, flank pain, frequency, genital sores, hematuria, menstrual problem, pelvic pain, urgency, vaginal bleeding, vaginal discharge and vaginal pain. Musculoskeletal: Negative. Skin: Negative. Allergic/Immunologic: Negative. Neurological: Negative. Hematological: Negative. Psychiatric/Behavioral: Negative. Objective:     Physical Exam  Constitutional:       Appearance: She is well-developed. HENT:      Head: Normocephalic. Eyes:      General: Lids are normal. Vision grossly intact. Neck:      Musculoskeletal: Normal range of motion and neck supple. Thyroid: No thyromegaly.    Cardiovascular:      Rate and Rhythm: Normal oriented to person, place, and time. Psychiatric:         Behavior: Behavior normal.         Thought Content: Thought content normal.         Judgment: Judgment normal.         Assessment:       Diagnosis Orders   1. Encounter for well woman exam with routine gynecological exam     2. Screening mammogram, encounter for     3. Urinary frequency  POCT Urinalysis no Micro        Plan:      Medications placedthis encounter:  No orders of the defined types were placed in this encounter. Orders placedthis encounter:  Orders Placed This Encounter   Procedures    POCT Urinalysis no Micro         Follow up:  Return in about 1 year (around 10/26/2021) for Annual.   Repeat Annual GYN exam every 1 year. Cervical Cytology exam starts age 24. If Negative Cytology;  follow-up screening per current guidelines. Mammograms yearly starting at age 36. Calcium and Vitamin D dosing reviewed ( age appropriate ). Colonoscopy and bone density screening discussed ( age appropriate ). Birth control and STD prevention discussed ( age appropriate ). Gardisil counseling completed for all patients 7-35 yo. Routine health maintenance ( per PCP and guidelines ). The patient was asked if she would like a chaperone present for her intimate exam. She  Declined the chaperone.  Poly Sahu

## 2020-10-28 LAB — URINE CULTURE, ROUTINE: NORMAL

## 2020-11-10 ENCOUNTER — TELEPHONE (OUTPATIENT)
Dept: FAMILY MEDICINE CLINIC | Age: 50
End: 2020-11-10

## 2020-11-13 NOTE — TELEPHONE ENCOUNTER
Spoke to patient, she is still having abdominal issues. She also mentions having cough and elevated temp at times. I let her know that we added her to scheduled 11/20/20 @ 1 pm for VV. I also ordered a Covid test per her request. Patient ad rapid negative test but still has sxs at this time.  She will call back over the weekend if she needs anything else

## 2020-11-16 ENCOUNTER — HOSPITAL ENCOUNTER (OUTPATIENT)
Dept: WOMENS IMAGING | Age: 50
Discharge: HOME | End: 2020-11-18
Payer: COMMERCIAL

## 2020-11-16 PROCEDURE — 77063 BREAST TOMOSYNTHESIS BI: CPT

## 2020-11-18 ENCOUNTER — NURSE TRIAGE (OUTPATIENT)
Dept: OTHER | Facility: CLINIC | Age: 50
End: 2020-11-18

## 2020-11-18 ENCOUNTER — TELEPHONE (OUTPATIENT)
Dept: FAMILY MEDICINE CLINIC | Age: 50
End: 2020-11-18

## 2020-11-18 NOTE — TELEPHONE ENCOUNTER
Reason for Disposition   Message left on identified voice mail    Protocols used: NO CONTACT OR DUPLICATE CONTACT CALL-ADULT-AH    Pt called into [facility] and attempted transfer to this writer at 0793 for complaint of CP and pressure when she woke this morning. Pt hung up prior to call being transferred to writer. Writer attempted calling pt x2 on identified line and was unsuccessful. LVMs. Writer call [facility] non emergency number and was transferred to [facility] and spoke to [email or web address]. Pt name and address given and well check will be done to ensure safety of pt. No triage by this writer was done at this time. [id number] Writer received a call back from [employer], pt is not answering. Gave officer pts phone number and also the number of her emergency contact in chart. [facility] [facility] office called- Dispatcher Yahaira Rosas requested pts SS# and writer provided. [address] [facility] called-  gave access to pts apartment to do check. Pt is not there. Pts car is not in the parking lot. Officer is going to attempt a call to parent and pts work at this time.

## 2020-11-18 NOTE — TELEPHONE ENCOUNTER
Patient received call back from office during transfer and ended call. Attention Provider: Thank you for allowing me to participate in the care of your patient. The patient was connected to triage in response to information provided to the ECC. Please do not respond through this encounter as the response is not directed to a shared pool.         Reason for Disposition   Caller has already spoken with the PCP (or office), and has no further questions    Protocols used: NO CONTACT OR DUPLICATE CONTACT CALL-ADULT-[place]

## 2020-11-19 NOTE — TELEPHONE ENCOUNTER
Meagan please order 30 day monitor  If we cannot do that she will need to see [facility]  Let me know

## 2020-11-20 ENCOUNTER — TELEPHONE (OUTPATIENT)
Dept: FAMILY MEDICINE CLINIC | Age: 50
End: 2020-11-20

## 2020-11-20 ENCOUNTER — VIRTUAL VISIT (OUTPATIENT)
Dept: FAMILY MEDICINE CLINIC | Age: 50
End: 2020-11-20
Payer: COMMERCIAL

## 2020-11-20 PROCEDURE — 99443 PR PHYS/QHP TELEPHONE EVALUATION 21-30 MIN: CPT | Performed by: INTERNAL MEDICINE

## 2020-11-20 RX ORDER — DICYCLOMINE HCL 20 MG
20 TABLET ORAL EVERY 6 HOURS PRN
Qty: 120 TABLET | Refills: 0 | Status: SHIPPED | OUTPATIENT
Start: 2020-11-20 | End: 2020-12-28

## 2020-11-20 RX ORDER — OMEPRAZOLE 40 MG/1
40 CAPSULE, DELAYED RELEASE ORAL 2 TIMES DAILY
Qty: 1 CAPSULE | Refills: 0
Start: 2020-11-20 | End: 2020-12-28

## 2020-11-20 ASSESSMENT — ENCOUNTER SYMPTOMS
EYE PAIN: 0
BACK PAIN: 0
ABDOMINAL PAIN: 0
SHORTNESS OF BREATH: 0

## 2020-11-20 ASSESSMENT — PATIENT HEALTH QUESTIONNAIRE - PHQ9
1. LITTLE INTEREST OR PLEASURE IN DOING THINGS: 1
2. FEELING DOWN, DEPRESSED OR HOPELESS: 1
SUM OF ALL RESPONSES TO PHQ QUESTIONS 1-9: 2
SUM OF ALL RESPONSES TO PHQ9 QUESTIONS 1 & 2: 2
SUM OF ALL RESPONSES TO PHQ QUESTIONS 1-9: 2
SUM OF ALL RESPONSES TO PHQ QUESTIONS 1-9: 2

## 2020-11-20 NOTE — PROGRESS NOTES
Subjective:      Patient ID: London Farr is a 44 y.o. female who presents today with:  No chief complaint on file. HPI    One day of chest pressure  Day before her stomach was bothering her  She thinks they were related  All the way across her chest (that has gone away)  No current pain or pressure at all. Mentions abdominal issues  No matter what she eats  Denies constipation. Denies right upper quadrant pain. \"stomach always gurgling\"  Worse when she eats no radiation of her symptoms. Gas and bloating  Rotten eggs  For a couple of weeks. Despite 2 pepcid per dose   She's still having abdominal bloating, belching. She even tried omeprazole. At 40 mg once daily. Omeprazole didn't do anything  Passing gas doesn't help. Failed Cymbalta due to side effects. Denies cp or pressure. No si/hi.    Past Medical History:   Diagnosis Date    Abnormal Pap smear of cervix     Depression     Depression with anxiety     Depression with anxiety     Iron deficiency anemia 2019    Stroke ([facility] [address] 2016    Idiopathic she is still in process     Thyroid disease      Past Surgical History:   Procedure Laterality Date    BLADDER SURGERY      done by Dr. Britton Romero x10 yrs    COLONOSCOPY  2019    KERRY AMADO MD    DILATION AND CURETTAGE      ENDOMETRIAL ABLATION      TONSILLECTOMY       Social History     Socioeconomic History    Marital status:      Spouse name: Not on file    Number of children: Not on file    Years of education: Not on file    Highest education level: Not on file   Occupational History    Not on file   Social Needs    Financial resource strain: Not on file    Food insecurity     Worry: Not on file     Inability: Not on file    Transportation needs     Medical: Not on file     Non-medical: Not on file   Tobacco Use    Smoking status: Former Smoker     Types: Cigarettes     Start date: 1988     Last attempt to quit: 1990     Years since quittin.4  Smokeless tobacco: Never Used   Substance and Sexual Activity    Alcohol use: Yes     Comment: ocassionally    Drug use: No    Sexual activity: Not Currently   Lifestyle    Physical activity     Days per week: Not on file     Minutes per session: Not on file    Stress: Not on file   Relationships    Social connections     Talks on phone: Not on file     Gets together: Not on file     Attends Anabaptism service: Not on file     Active member of club or organization: Not on file     Attends meetings of clubs or organizations: Not on file     Relationship status: Not on file    Intimate partner violence     Fear of current or ex partner: Not on file     Emotionally abused: Not on file     Physically abused: Not on file     Forced sexual activity: Not on file   Other Topics Concern    Not on file   Social History Narrative    Not on file     Allergies   Allergen Reactions    Amoxicillin Hives    Nsaids Other (See Comments)     STROKE/NO NSAIDS ALLOWED    Pcn [Penicillins] Rash     Current Outpatient Medications on File Prior to Visit   Medication Sig Dispense Refill    levothyroxine (SYNTHROID) 100 MCG tablet Take 1 tablet by mouth daily 30 tablet 0    aspirin 81 MG tablet Take 81 mg by mouth 2 times daily       No current facility-administered medications on file prior to visit. I have personally reviewed the ROS, PMH, PFH, and social history     Review of Systems   Constitutional: Negative for chills and fever. HENT: Negative for congestion. Eyes: Negative for pain. Respiratory: Negative for shortness of breath. Cardiovascular: Negative for chest pain. Gastrointestinal: Negative for abdominal pain. Abdominal bloating after eating  She will also experience pain. Genitourinary: Negative for hematuria. Musculoskeletal: Negative for back pain. Allergic/Immunologic: Negative for immunocompromised state. Neurological: Negative for headaches.    Psychiatric/Behavioral: Negative for hallucinations. Objective: There were no vitals taken for this visit. Physical Exam    Unable to perform given telephone visit. Assessment:       Diagnosis Orders   1. Intermittent abdominal pain  US GALLBLADDER RUQ    omeprazole (PRILOSEC) 40 MG delayed release capsule    Amylase    Lipase   2. Bloating  dicyclomine (BENTYL) 20 MG tablet    omeprazole (PRILOSEC) 40 MG delayed release capsule   3. Encounter for screening mammogram for malignant neoplasm of breast  [facility] DIGITAL SCREEN W OR WO CAD BILATERAL         Plan:    Video visit done because of coronavirus pandemic. Visit done via doxy. me   explained billeable visit, patient understands and agrees to continue  I was at home and patient was at home during this visit. Repeat thyroid US in one year, risk of cancer. She completed sleep study  Mammogram ordered last visit. See orders    Saw rheum  Verify if crestor covered by insurance   30 day monitor. see other te. Orders Placed This Encounter   Procedures    US GALLBLADDER RUQ     Standing Status:   Future     Standing Expiration Date:   11/20/2021    MARYCRUZ DIGITAL SCREEN W OR WO CAD BILATERAL     Standing Status:   Future     Standing Expiration Date:   1/20/2022     Order Specific Question:   Reason for exam:     Answer:   screening    Amylase     Standing Status:   Future     Standing Expiration Date:   11/20/2021    Lipase     Standing Status:   Future     Standing Expiration Date:   11/20/2021     Orders Placed This Encounter   Medications    dicyclomine (BENTYL) 20 MG tablet     Sig: Take 1 tablet by mouth every 6 hours as needed (abdominal pain)     Dispense:  120 tablet     Refill:  0    omeprazole (PRILOSEC) 40 MG delayed release capsule     Sig: Take 1 capsule by mouth 2 times daily     Dispense:  1 capsule     Refill:  0   otc lactase  SHORT threshold to restart meds, but want to see if bentyl helps before starting more meds.    If new, worsening,

## 2020-11-23 ENCOUNTER — TELEPHONE (OUTPATIENT)
Dept: FAMILY MEDICINE CLINIC | Age: 50
End: 2020-11-23

## 2020-11-25 RX ORDER — ROSUVASTATIN CALCIUM 5 MG/1
5 TABLET, COATED ORAL NIGHTLY
Qty: 30 TABLET | Refills: 0 | Status: SHIPPED | OUTPATIENT
Start: 2020-11-25 | End: 2020-12-28 | Stop reason: SDUPTHER

## 2020-11-27 ENCOUNTER — TELEPHONE (OUTPATIENT)
Dept: FAMILY MEDICINE CLINIC | Age: 50
End: 2020-11-27

## 2020-12-03 ENCOUNTER — HOSPITAL ENCOUNTER (OUTPATIENT)
Dept: SLEEP CENTER | Age: 50
Discharge: HOME | End: 2020-12-05
Payer: COMMERCIAL

## 2020-12-03 PROCEDURE — 95806 SLEEP STUDY UNATT&RESP EFFT: CPT

## 2020-12-23 ENCOUNTER — NURSE ONLY (OUTPATIENT)
Dept: PRIMARY CARE CLINIC | Age: 50
End: 2020-12-23

## 2020-12-23 ENCOUNTER — TELEPHONE (OUTPATIENT)
Dept: FAMILY MEDICINE CLINIC | Age: 50
End: 2020-12-23

## 2020-12-23 DIAGNOSIS — R53.83 FATIGUE, UNSPECIFIED TYPE: ICD-10-CM

## 2020-12-23 DIAGNOSIS — Z20.822 CLOSE EXPOSURE TO COVID-19 VIRUS: ICD-10-CM

## 2020-12-23 DIAGNOSIS — R51.9 NONINTRACTABLE HEADACHE, UNSPECIFIED CHRONICITY PATTERN, UNSPECIFIED HEADACHE TYPE: ICD-10-CM

## 2020-12-23 NOTE — TELEPHONE ENCOUNTER
Patient c/o bad headache and doesn't feel well since this morning. Her daughter just tested positive for Covid today. She is wanting to know if a test can be ordered for her today.  Please advise

## 2020-12-23 NOTE — TELEPHONE ENCOUNTER
Angela Worthington does not see the order for the 48 hour heart monitor. Patient is waiting for her call. She needs the order in before she makes the call. Angela Worthington also has a few questions regarding Leilani Zhang as well.  Please call her at [phone] opt [phone]

## 2020-12-23 NOTE — TELEPHONE ENCOUNTER
Kyle Mcclain  believe this was changed to a 48 hour holter  If everything is done you can close this out.

## 2020-12-23 NOTE — TELEPHONE ENCOUNTER
Martina Lin is at [address] [facility] now. She was ordered a 30 day event monitor. Her insurance runs out at the end of the year. She will have no insurance to finish this out. Would you consider ordering a 48 hour monitor so we can at least get a look and her insurance will still help with the bill? We need to let them know ASAP.

## 2020-12-24 LAB
SARS-COV-2: NOT DETECTED
SOURCE: NORMAL

## 2020-12-28 ENCOUNTER — OFFICE VISIT (OUTPATIENT)
Dept: FAMILY MEDICINE CLINIC | Age: 50
End: 2020-12-28
Payer: COMMERCIAL

## 2020-12-28 VITALS
HEIGHT: 64 IN | WEIGHT: 150 LBS | RESPIRATION RATE: 15 BRPM | OXYGEN SATURATION: 98 % | BODY MASS INDEX: 25.61 KG/M2 | SYSTOLIC BLOOD PRESSURE: 121 MMHG | HEART RATE: 75 BPM | DIASTOLIC BLOOD PRESSURE: 74 MMHG | TEMPERATURE: 97.2 F

## 2020-12-28 PROCEDURE — G8427 DOCREV CUR MEDS BY ELIG CLIN: HCPCS | Performed by: INTERNAL MEDICINE

## 2020-12-28 PROCEDURE — G8484 FLU IMMUNIZE NO ADMIN: HCPCS | Performed by: INTERNAL MEDICINE

## 2020-12-28 PROCEDURE — 1036F TOBACCO NON-USER: CPT | Performed by: INTERNAL MEDICINE

## 2020-12-28 PROCEDURE — G8419 CALC BMI OUT NRM PARAM NOF/U: HCPCS | Performed by: INTERNAL MEDICINE

## 2020-12-28 PROCEDURE — 3017F COLORECTAL CA SCREEN DOC REV: CPT | Performed by: INTERNAL MEDICINE

## 2020-12-28 PROCEDURE — 99214 OFFICE O/P EST MOD 30 MIN: CPT | Performed by: INTERNAL MEDICINE

## 2020-12-28 RX ORDER — LEVOTHYROXINE SODIUM 0.1 MG/1
100 TABLET ORAL DAILY
Qty: 90 TABLET | Refills: 3 | Status: SHIPPED | OUTPATIENT
Start: 2020-12-28 | End: 2021-04-05 | Stop reason: SDUPTHER

## 2020-12-28 RX ORDER — ROSUVASTATIN CALCIUM 5 MG/1
5 TABLET, COATED ORAL NIGHTLY
Qty: 30 TABLET | Refills: 1 | Status: SHIPPED | OUTPATIENT
Start: 2020-12-28 | End: 2021-04-05

## 2020-12-28 ASSESSMENT — ENCOUNTER SYMPTOMS
ABDOMINAL PAIN: 0
BACK PAIN: 0
EYE PAIN: 0
SHORTNESS OF BREATH: 0

## 2020-12-28 NOTE — PROGRESS NOTES
Subjective:      Patient ID: Marina Martin is a 44 y.o. female who presents today with:  Chief Complaint   Patient presents with    Follow-up    Discuss Labs    Flu Vaccine     declined at todays visit due to not feeling well        HPI     \"stomach troubles\"  Are better  Not taking bentyl or omeprazole  Better  Changed her diet  Made her diet (bland)  Cut out fatty foods. Cryptogenic stroke  History of  Had to reschedule heart monitor. hpl  Currently not on statin  Willing to take crestor.    Past Medical History:   Diagnosis Date    Abnormal Pap smear of cervix     Depression     Depression with anxiety     Depression with anxiety     Iron deficiency anemia 2019    Stroke ([address] 2016    Idiopathic she is still in process     Thyroid disease      Past Surgical History:   Procedure Laterality Date    BLADDER SURGERY      [facility] by  [facility] x10 yrs    COLONOSCOPY  2019    KERRY AMADO MD    DILATION AND CURETTAGE      ENDOMETRIAL ABLATION      TONSILLECTOMY       Social History     Socioeconomic History    Marital status:      Spouse name: Not on file    Number of children: Not on file    Years of education: Not on file    Highest education level: Not on file   Occupational History    Not on file   Social Needs    Financial resource strain: Not on file    Food insecurity     Worry: Not on file     Inability: Not on file    Transportation needs     Medical: Not on file     Non-medical: Not on file   Tobacco Use    Smoking status: Former Smoker     Types: Cigarettes     Start date: 1988     Quit date: 1990     Years since quittin.5    Smokeless tobacco: Never Used   Substance and Sexual Activity    Alcohol use: Yes     Comment: ocassionally    Drug use: No    Sexual activity: Not Currently   Lifestyle    Physical activity     Days per week: Not on file     Minutes per session: Not on file    Stress: Not on file   Relationships    Social connections Talks on phone: Not on file     Gets together: Not on file     Attends [employer] service: Not on file     Active member of club or organization: Not on file     Attends meetings of clubs or organizations: Not on file     Relationship status: Not on file    Intimate partner violence     Fear of current or ex partner: Not on file     Emotionally abused: Not on file     Physically abused: Not on file     Forced sexual activity: Not on file   Other Topics Concern    Not on file   Social History Narrative    Not on file     Allergies   Allergen Reactions    Amoxicillin Hives    Nsaids Other (See Comments)     STROKE/NO NSAIDS ALLOWED    Pcn [Penicillins] Rash     Current Outpatient Medications on File Prior to Visit   Medication Sig Dispense Refill    aspirin 81 MG tablet Take 81 mg by mouth 2 times daily       No current facility-administered medications on file prior to visit. I have personally reviewed the ROS, PMH, PFH, and social history     Review of Systems   Constitutional: Negative for chills and fever. HENT: Negative for congestion. Eyes: Negative for pain. Respiratory: Negative for shortness of breath. Cardiovascular: Negative for chest pain. Gastrointestinal: Negative for abdominal pain. Genitourinary: Negative for hematuria. Musculoskeletal: Negative for back pain. Allergic/Immunologic: Negative for immunocompromised state. Neurological: Negative for headaches. Psychiatric/Behavioral: Negative for hallucinations. Objective:   /74 (Site: Left Upper Arm, Position: Sitting, Cuff Size: Large Adult)   Pulse 75   Temp 97.2 °F (36.2 °C) (Oral)   Resp 15   Ht 5' 4\" (1.626 m)   Wt 150 lb (68 kg)   LMP 12/28/2018   SpO2 98%   BMI 25.75 kg/m²     Physical Exam  Constitutional:       Appearance: She is well-developed. HENT:      Head: Normocephalic. Eyes:      Pupils: Pupils are equal, round, and reactive to light. Neck:      Musculoskeletal: Neck supple. Trachea: No tracheal deviation. Cardiovascular:      Rate and Rhythm: Normal rate and regular rhythm. Heart sounds: Normal heart sounds. No murmur. No friction rub. No gallop. Pulmonary:      Effort: Pulmonary effort is normal. No respiratory distress. Breath sounds: Normal breath sounds. No wheezing or rales. Abdominal:      General: Bowel sounds are normal. There is no distension. Palpations: Abdomen is soft. Tenderness: There is no abdominal tenderness. There is no right CVA tenderness, left CVA tenderness, guarding or rebound. Comments: Negative murphys sign    Musculoskeletal:      Right lower leg: No edema. Left lower leg: No edema. Skin:     General: Skin is warm and dry. Findings: No erythema or rash. Neurological:      Mental Status: She is oriented to person, place, and time. Assessment:       Diagnosis Orders   1. Cryptogenic stroke (HCC)  TSH with Reflex    Vitamin D 25 Hydroxy    CBC Auto Differential    Comprehensive Metabolic Panel    Lipid Panel   2. Intermittent abdominal pain  TSH with Reflex    Vitamin D 25 Hydroxy    CBC Auto Differential    Comprehensive Metabolic Panel    Lipid Panel   3.  Hyperlipidemia, unspecified hyperlipidemia type  TSH with Reflex    Vitamin D 25 Hydroxy    CBC Auto Differential    Comprehensive Metabolic Panel    Lipid Panel         Plan:    vc  Repeat thyroid US in one year, (Due 05/2021)   Suspicious could be GB, get test when you can, call asap should it change   See orders    Saw rheum  Has to reschedule heart monitor due to insurance                Orders Placed This Encounter   Procedures    TSH with Reflex     Standing Status:   Future     Standing Expiration Date:   12/28/2021    Vitamin D 25 Hydroxy     Standing Status:   Future     Standing Expiration Date:   12/28/2021    CBC Auto Differential     Standing Status:   Future     Standing Expiration Date:   12/28/2021    Comprehensive Metabolic Panel Standing Status:   Future     Standing Expiration Date:   12/28/2021    Lipid Panel     Standing Status:   Future     Standing Expiration Date:   12/28/2021     Order Specific Question:   Is Patient Fasting?/# of Hours     Answer:   10     Orders Placed This Encounter   Medications    levothyroxine (SYNTHROID) 100 MCG tablet     Sig: Take 1 tablet by mouth daily     Dispense:  90 tablet     Refill:  3    rosuvastatin (CRESTOR) 5 MG tablet     Sig: Take 1 tablet by mouth nightly     Dispense:  30 tablet     Refill:  1   call me when you want flu shot  Recommend it though  F/u after GB us, sooner if any issues, she will call immediately if sx return with fatty foods, gets new or progressive symptoms, etc.   Get this week please. If anything should change or worsen call ASAP, don't wait for next scheduled appointment. No follow-ups on file.       Kasey Terry MD

## 2021-01-07 ENCOUNTER — TELEPHONE (OUTPATIENT)
Dept: FAMILY MEDICINE CLINIC | Age: 51
End: 2021-01-07

## 2021-01-07 DIAGNOSIS — Z20.822 CLOSE EXPOSURE TO COVID-19 VIRUS: Primary | ICD-10-CM

## 2021-01-07 DIAGNOSIS — R52 BODY ACHES: ICD-10-CM

## 2021-01-07 DIAGNOSIS — R05.9 COUGH: ICD-10-CM

## 2021-01-07 DIAGNOSIS — R51.9 NONINTRACTABLE HEADACHE, UNSPECIFIED CHRONICITY PATTERN, UNSPECIFIED HEADACHE TYPE: ICD-10-CM

## 2021-01-07 NOTE — TELEPHONE ENCOUNTER
Patient  Requesting a return call when Covid order is done, and requesting a covid testing facility that is free due to not having insurance.

## 2021-01-07 NOTE — TELEPHONE ENCOUNTER
Meagan   Order covid test AND FLU test   Yes to test  Quarantine  If any chest pain, shortness of breath, arm or leg swelling, or fevers which dont subside with tylenol call immediately  Hold on covid vaccine for now given she has symptoms.

## 2021-04-05 ENCOUNTER — OFFICE VISIT (OUTPATIENT)
Dept: FAMILY MEDICINE CLINIC | Age: 51
End: 2021-04-05
Payer: COMMERCIAL

## 2021-04-05 VITALS
BODY MASS INDEX: 24.92 KG/M2 | OXYGEN SATURATION: 99 % | HEIGHT: 64 IN | TEMPERATURE: 97.7 F | WEIGHT: 146 LBS | SYSTOLIC BLOOD PRESSURE: 108 MMHG | DIASTOLIC BLOOD PRESSURE: 70 MMHG | HEART RATE: 68 BPM

## 2021-04-05 DIAGNOSIS — E03.9 HYPOTHYROIDISM, UNSPECIFIED TYPE: Primary | ICD-10-CM

## 2021-04-05 DIAGNOSIS — M79.7 FIBROMYALGIA: ICD-10-CM

## 2021-04-05 DIAGNOSIS — E03.9 HYPOTHYROIDISM, UNSPECIFIED TYPE: ICD-10-CM

## 2021-04-05 DIAGNOSIS — F32.A DEPRESSION, UNSPECIFIED DEPRESSION TYPE: ICD-10-CM

## 2021-04-05 DIAGNOSIS — M25.50 ARTHRALGIA, UNSPECIFIED JOINT: ICD-10-CM

## 2021-04-05 DIAGNOSIS — K58.9 IRRITABLE BOWEL SYNDROME, UNSPECIFIED TYPE: ICD-10-CM

## 2021-04-05 DIAGNOSIS — E04.1 THYROID NODULE: ICD-10-CM

## 2021-04-05 DIAGNOSIS — M25.50 POLYARTHRALGIA: ICD-10-CM

## 2021-04-05 LAB
ALBUMIN SERPL-MCNC: 5 G/DL (ref 3.5–4.6)
ALP BLD-CCNC: 70 U/L (ref 40–130)
ALT SERPL-CCNC: 13 U/L (ref 0–33)
ANION GAP SERPL CALCULATED.3IONS-SCNC: 14 MEQ/L (ref 9–15)
AST SERPL-CCNC: 20 U/L (ref 0–35)
BILIRUB SERPL-MCNC: <0.2 MG/DL (ref 0.2–0.7)
BUN BLDV-MCNC: 8 MG/DL (ref 6–20)
C-REACTIVE PROTEIN: 0.5 MG/L (ref 0–5)
CALCIUM SERPL-MCNC: 9.1 MG/DL (ref 8.5–9.9)
CHLORIDE BLD-SCNC: 104 MEQ/L (ref 95–107)
CO2: 25 MEQ/L (ref 20–31)
CREAT SERPL-MCNC: 0.66 MG/DL (ref 0.5–0.9)
GFR AFRICAN AMERICAN: >60
GFR NON-AFRICAN AMERICAN: >60
GLOBULIN: 2.7 G/DL (ref 2.3–3.5)
GLUCOSE BLD-MCNC: 87 MG/DL (ref 70–99)
HCT VFR BLD CALC: 42 % (ref 37–47)
HEMOGLOBIN: 14.1 G/DL (ref 12–16)
MCH RBC QN AUTO: 30.5 PG (ref 27–31.3)
MCHC RBC AUTO-ENTMCNC: 33.7 % (ref 33–37)
MCV RBC AUTO: 90.7 FL (ref 82–100)
PDW BLD-RTO: 13.7 % (ref 11.5–14.5)
PLATELET # BLD: 274 K/UL (ref 130–400)
POTASSIUM SERPL-SCNC: 4 MEQ/L (ref 3.4–4.9)
RBC # BLD: 4.62 M/UL (ref 4.2–5.4)
SEDIMENTATION RATE, ERYTHROCYTE: 5 MM (ref 0–30)
SODIUM BLD-SCNC: 143 MEQ/L (ref 135–144)
TOTAL PROTEIN: 7.7 G/DL (ref 6.3–8)
TSH SERPL DL<=0.05 MIU/L-ACNC: 0.68 UIU/ML (ref 0.44–3.86)
VITAMIN D 25-HYDROXY: 40.5 NG/ML (ref 30–100)
WBC # BLD: 8 K/UL (ref 4.8–10.8)

## 2021-04-05 PROCEDURE — 99214 OFFICE O/P EST MOD 30 MIN: CPT | Performed by: FAMILY MEDICINE

## 2021-04-05 RX ORDER — LEVOTHYROXINE SODIUM 0.1 MG/1
100 TABLET ORAL DAILY
Qty: 90 TABLET | Refills: 3 | Status: SHIPPED | OUTPATIENT
Start: 2021-04-05 | End: 2022-01-04 | Stop reason: SDUPTHER

## 2021-04-05 RX ORDER — PREDNISONE 10 MG/1
TABLET ORAL
Qty: 30 TABLET | Refills: 0 | Status: SHIPPED | OUTPATIENT
Start: 2021-04-05 | End: 2021-04-15

## 2021-04-05 SDOH — ECONOMIC STABILITY: FOOD INSECURITY: WITHIN THE PAST 12 MONTHS, YOU WORRIED THAT YOUR FOOD WOULD RUN OUT BEFORE YOU GOT MONEY TO BUY MORE.: NEVER TRUE

## 2021-04-05 SDOH — ECONOMIC STABILITY: TRANSPORTATION INSECURITY
IN THE PAST 12 MONTHS, HAS LACK OF TRANSPORTATION KEPT YOU FROM MEETINGS, WORK, OR FROM GETTING THINGS NEEDED FOR DAILY LIVING?: NO

## 2021-04-05 SDOH — ECONOMIC STABILITY: FOOD INSECURITY: WITHIN THE PAST 12 MONTHS, THE FOOD YOU BOUGHT JUST DIDN'T LAST AND YOU DIDN'T HAVE MONEY TO GET MORE.: NEVER TRUE

## 2021-04-05 NOTE — PROGRESS NOTES
Chief Complaint   Patient presents with    Pain     body aches all over    GI Problem       HPI:  Florina Woodard is a 46 y.o. female     New to this office, previously with Dr. Eric Levin  Lives here in [place]    Reports body aches  Hips/knees/shoulders  Has stopped crestor     History of fibromyalgia  Was on cymbalta/elavil    Has seen dr. Charity Pat she is miserable    She did therapy      Has trouble with medications    She takes tylenol    She had a stroke in 2016  MRI reviewed    cymbalta perhaps caused nausea    trintellix really helped her, but had some anxiety at the time    Avoiding dairy      Patient Active Problem List   Diagnosis    Full incontinence of feces    Cerebrovascular accident (CVA) ([address]    Depression with anxiety    Hypothyroidism    Iron deficiency anemia    Prediabetes    Right hip impingement syndrome    Rosacea    Spondylosis of lumbar region without myelopathy or radiculopathy    Transient global amnesia       Current Outpatient Medications   Medication Sig Dispense Refill    levothyroxine (SYNTHROID) 100 MCG tablet Take 1 tablet by mouth daily 90 tablet 3    VORTIoxetine (TRINTELLIX) 10 MG TABS tablet Take 1 tablet by mouth daily 30 tablet 3    predniSONE (DELTASONE) 10 MG tablet 40mg po daily times 3 days, 30mg po daily times 3 days, 20mg po daily times three days, 10mg po daily times 3 days then stop 30 tablet 0    aspirin 81 MG tablet Take 81 mg by mouth 2 times daily       No current facility-administered medications for this visit.           Past Medical History:   Diagnosis Date    Abnormal Pap smear of cervix     Depression     Depression with anxiety     Depression with anxiety     Iron deficiency anemia 4/12/2019    Stroke [address] [address] 05/2016    Idiopathic she is still in process     Thyroid disease      Past Surgical History:   Procedure Laterality Date    BLADDER SURGERY      done by Dr. [facility] x10 yrs    COLONOSCOPY  03/04/2019    Trevor Jolley MD   [facility] DILATION AND CURETTAGE      ENDOMETRIAL ABLATION      TONSILLECTOMY       History reviewed. No pertinent family history.   Social History     Socioeconomic History    Marital status:      Spouse name: None    Number of children: None    Years of education: None    Highest education level: None   Occupational History    None   Social Needs    Financial resource strain: Not hard at all   [place] insecurity     Worry: Never true     Inability: Never true   [employer] needs     Medical: No     Non-medical: No   Tobacco Use    Smoking status: Former Smoker     Types: Cigarettes     Start date: 1988     Quit date: 1990     Years since quittin.7    Smokeless tobacco: Never Used   Substance and Sexual Activity    Alcohol use: Yes     Comment: ocassionally    Drug use: No    Sexual activity: Not Currently   Lifestyle    Physical activity     Days per week: None     Minutes per session: None    Stress: None   Relationships    Social connections     Talks on phone: None     Gets together: None     Attends Christianity service: None     Active member of club or organization: None     Attends meetings of clubs or organizations: None     Relationship status: None    Intimate partner violence     Fear of current or ex partner: None     Emotionally abused: None     Physically abused: None     Forced sexual activity: None   Other Topics Concern    None   Social History Narrative    None     Allergies   Allergen Reactions    Amoxicillin Hives    Nsaids Other (See Comments)     STROKE/NO NSAIDS ALLOWED    Pcn [Penicillins] Rash       Review of Systems:   General ROS: negative for - chills, fatigue, fever, malaise, weight gain or weight loss  Respiratory ROS: no cough, shortness of breath, or wheezing  Cardiovascular ROS: no chest pain or dyspnea on exertion  Gastrointestinal ROS: pain/cramps/gas  Genito-Urinary ROS: no dysuria, trouble voiding  Musculoskeletal ROS: diffuse joint aches and pains  Psych: depressed, low motivation  In general patient otherwise reports feeling well. Physical Exam:  /70 (Site: Left Upper Arm)   Pulse 68   Temp 97.7 °F (36.5 °C)   Ht 5' 4\" (1.626 m)   Wt 146 lb (66.2 kg)   LMP 03/18/2019 (Approximate)   SpO2 99%   Breastfeeding No   BMI 25.06 kg/m²     Gen: Well, NAD, Alert, Oriented x 3   HEENT: EOMI, eyes clear, MMM  Skin: without rash or jaundice  Neck: no significant lymphadenopathy or thyromegaly  Lungs: CTA B w/out Rales/Wheezes/Rhonchi, Good respiratory effort   Heart: RRR, S1S2, w/out M/R/G, non-displaced PMI   Ext: No C/C/E Bilaterally. Neuro: Neurovascularly intact w/ Sensory/Motor intact UE/LE Bilaterally. Lab Results   Component Value Date    WBC 6.4 05/29/2020    HGB 14.0 05/29/2020    HCT 41.7 05/29/2020     05/29/2020    CHOL 222 (H) 05/29/2020    TRIG 61 05/29/2020    HDL 99 (H) 05/29/2020    ALT 11 05/29/2020    AST 18 05/29/2020     05/29/2020    K 4.3 05/29/2020     05/29/2020    CREATININE 0.74 05/29/2020    BUN 14 05/29/2020    CO2 23 05/29/2020    TSH 0.579 04/12/2019    INR 0.9 06/20/2016    LABA1C 5.7 09/09/2020         A&P   Diagnosis Orders   1. Hypothyroidism, unspecified type  levothyroxine (SYNTHROID) 100 MCG tablet    TSH without Reflex   2. Fibromyalgia  predniSONE (DELTASONE) 10 MG tablet    Vitamin D 25 Hydroxy   3. Polyarthralgia  predniSONE (DELTASONE) 10 MG tablet    CBC    Sedimentation Rate    C-Reactive Protein   4. Arthralgia, unspecified joint     5. Depression, unspecified depression type  VORTIoxetine (TRINTELLIX) 10 MG TABS tablet    Comprehensive Metabolic Panel   6. Irritable bowel syndrome, unspecified type  Common Food Allergen Profile    Celiac Reflex Panel   7.  Thyroid nodule  US HEAD NECK SOFT TISSUE THYROID     Will try some prednisone to help with joint pains    Restart trintellix    Difficult case, but hopefully can help her     Labs as ordered             Alejandra White MD

## 2021-04-06 LAB — IGA: 108 MG/DL (ref 68–408)

## 2021-04-07 LAB
ALLERGEN BARLEY IGE: <0.1 KU/L
ALLERGEN BEEF: <0.1 KU/L
ALLERGEN CABBAGE IGE: <0.1 KU/L
ALLERGEN CARROT IGE: <0.1 KU/L
ALLERGEN CHICKEN IGE: <0.1 KU/L
ALLERGEN CODFISH IGE: <0.1 KU/L
ALLERGEN CORN IGE: <0.1 KU/L
ALLERGEN COW MILK IGE: <0.1 KU/L
ALLERGEN CRAB IGE: <0.1 KU/L
ALLERGEN EGG WHITE IGE: <0.1 KU/L
ALLERGEN GRAPE IGE: <0.1 KU/L
ALLERGEN LETTUCE IGE: <0.1 KU/L
ALLERGEN NAVY BEAN: <0.1 KU/L
ALLERGEN OAT: <0.1 KU/L
ALLERGEN ORANGE IGE: <0.1 KU/L
ALLERGEN PEANUT (F13) IGE: <0.1 KU/L
ALLERGEN PEPPER C. ANNUUM IGE: <0.1 KU/L
ALLERGEN PORK: <0.1 KU/L
ALLERGEN RICE IGE: <0.1 KU/L
ALLERGEN RYE IGE: <0.1 KU/L
ALLERGEN SOYBEAN IGE: <0.1 KU/L
ALLERGEN TOMATO IGE: <0.1 KU/L
ALLERGEN TUNA IGE: <0.1 KU/L
ALLERGEN WHEAT IGE: <0.1 KU/L
IGE: 7 IU/ML
POTATO, IGE: <0.1 KU/L
SHRIMP: <0.1 KU/L
TISSUE TRANSGLUTAMINASE IGA: <2 U/ML (ref 0–3)

## 2021-05-17 ENCOUNTER — OFFICE VISIT (OUTPATIENT)
Dept: FAMILY MEDICINE CLINIC | Age: 51
End: 2021-05-17
Payer: COMMERCIAL

## 2021-05-17 VITALS
SYSTOLIC BLOOD PRESSURE: 122 MMHG | BODY MASS INDEX: 24.41 KG/M2 | OXYGEN SATURATION: 97 % | WEIGHT: 143 LBS | HEIGHT: 64 IN | DIASTOLIC BLOOD PRESSURE: 76 MMHG | TEMPERATURE: 97.9 F | HEART RATE: 71 BPM | RESPIRATION RATE: 12 BRPM

## 2021-05-17 DIAGNOSIS — E04.1 THYROID NODULE: ICD-10-CM

## 2021-05-17 DIAGNOSIS — M79.7 FIBROMYALGIA: Primary | ICD-10-CM

## 2021-05-17 DIAGNOSIS — Z23 ENCOUNTER FOR IMMUNIZATION: ICD-10-CM

## 2021-05-17 DIAGNOSIS — R10.9 INTERMITTENT ABDOMINAL PAIN: ICD-10-CM

## 2021-05-17 PROCEDURE — 99214 OFFICE O/P EST MOD 30 MIN: CPT | Performed by: INTERNAL MEDICINE

## 2021-05-17 RX ORDER — ZOSTER VACCINE RECOMBINANT, ADJUVANTED 50 MCG/0.5
0.5 KIT INTRAMUSCULAR SEE ADMIN INSTRUCTIONS
Qty: 0.5 ML | Refills: 0 | Status: SHIPPED | OUTPATIENT
Start: 2021-05-17 | End: 2021-08-26 | Stop reason: CLARIF

## 2021-05-17 RX ORDER — OMEPRAZOLE 40 MG/1
40 CAPSULE, DELAYED RELEASE ORAL
Qty: 30 CAPSULE | Refills: 0 | Status: SHIPPED | OUTPATIENT
Start: 2021-05-17 | End: 2021-08-26

## 2021-05-17 RX ORDER — NORTRIPTYLINE HYDROCHLORIDE 10 MG/1
10 CAPSULE ORAL NIGHTLY
Qty: 30 CAPSULE | Refills: 0 | Status: SHIPPED | OUTPATIENT
Start: 2021-05-17 | End: 2021-06-30 | Stop reason: SINTOL

## 2021-05-17 RX ORDER — DICYCLOMINE HCL 20 MG
20 TABLET ORAL EVERY 6 HOURS PRN
Qty: 120 TABLET | Refills: 0 | Status: SHIPPED | OUTPATIENT
Start: 2021-05-17 | End: 2021-08-26

## 2021-05-17 ASSESSMENT — ENCOUNTER SYMPTOMS
ABDOMINAL PAIN: 1
SHORTNESS OF BREATH: 0
BACK PAIN: 0
EYE PAIN: 0

## 2021-05-17 ASSESSMENT — PATIENT HEALTH QUESTIONNAIRE - PHQ9
2. FEELING DOWN, DEPRESSED OR HOPELESS: 0
1. LITTLE INTEREST OR PLEASURE IN DOING THINGS: 0
SUM OF ALL RESPONSES TO PHQ QUESTIONS 1-9: 0
SUM OF ALL RESPONSES TO PHQ9 QUESTIONS 1 & 2: 0

## 2021-05-17 NOTE — PROGRESS NOTES
Subjective:      Patient ID: Violet Riley is a 46 y.o. female who presents today with:  Chief Complaint   Patient presents with    GI Problem     been going on for 1-2 months       HPI     Gas  Pain  It's her stomach  Intermittent  Gurgling  Pain  Intermittent  At least 2 months  No blood  Back from dr Becki Marcus  Was concerned about insurance issues  No fevers  No si/hi. pepcid didn't help.    Past Medical History:   Diagnosis Date    Abnormal Pap smear of cervix     Depression     Depression with anxiety     Depression with anxiety     Iron deficiency anemia 2019    Stroke ([address] 2016    Idiopathic she is still in process     Thyroid disease      Past Surgical History:   Procedure Laterality Date    BLADDER SURGERY      done by Dr. IDANIA [address] x10 yrs    COLONOSCOPY  2019    KERRY AMADO MD    DILATION AND CURETTAGE      ENDOMETRIAL ABLATION      TONSILLECTOMY       Social History     Socioeconomic History    Marital status:      Spouse name: Not on file    Number of children: Not on file    Years of education: Not on file    Highest education level: Not on file   Occupational History    Not on file   Tobacco Use    Smoking status: Former Smoker     Types: Cigarettes     Start date: 1988     Quit date: 1990     Years since quittin.8    Smokeless tobacco: Never Used   Vaping Use    Vaping Use: Never used   Substance and Sexual Activity    Alcohol use: Yes     Comment: ocassionally    Drug use: No    Sexual activity: Not Currently   Other Topics Concern    Not on file   Social History Narrative    Not on file     Social Determinants of Health     Financial Resource Strain: Low Risk     Difficulty of Paying Living Expenses: Not hard at all   Food Insecurity: No Food Insecurity    Worried About [address] [employer] in the Last Year: Never true    Jose Antonio of Food in the Last Year: Never true   Transportation Needs: No Transportation Needs    Lack of Transportation (Medical): No    Lack of Transportation (Non-Medical): No   Physical Activity:     Days of Exercise per Week:     Minutes of Exercise per Session:    Stress:     Feeling of Stress :    Social Connections:     Frequency of Communication with Friends and Family:     Frequency of Social Gatherings with Friends and Family:     Attends Latter-day Services:     Active Member of Clubs or Organizations:     Attends Club or Organization Meetings:     Marital Status:    Intimate Partner Violence:     Fear of Current or Ex-Partner:     Emotionally Abused:     Physically Abused:     Sexually Abused: Allergies   Allergen Reactions    Amoxicillin Hives    Nsaids Other (See Comments)     STROKE/NO NSAIDS ALLOWED    Pcn [Penicillins] Rash     Current Outpatient Medications on File Prior to Visit   Medication Sig Dispense Refill    levothyroxine (SYNTHROID) 100 MCG tablet Take 1 tablet by mouth daily 90 tablet 3    aspirin 81 MG tablet Take 81 mg by mouth 2 times daily       No current facility-administered medications on file prior to visit. I have personally reviewed the ROS, PMH, PFH, and social history     Review of Systems   Constitutional: Negative for chills and fever. HENT: Negative for congestion. Eyes: Negative for pain. Respiratory: Negative for shortness of breath. Cardiovascular: Negative for chest pain. Gastrointestinal: Positive for abdominal pain. Genitourinary: Negative for hematuria. Musculoskeletal: Negative for back pain. Allergic/Immunologic: Negative for immunocompromised state. Neurological: Negative for headaches. Psychiatric/Behavioral: Negative for hallucinations. Objective:   /76   Pulse 71   Temp 97.9 °F (36.6 °C)   Resp 12   Ht 5' 4\" (1.626 m)   Wt 143 lb (64.9 kg)   LMP 03/18/2019 (Approximate)   SpO2 97%   BMI 24.55 kg/m²     Physical Exam  Constitutional:       General: She is not in acute distress.      Appearance: Normal appearance. She is not ill-appearing, toxic-appearing or diaphoretic. HENT:      Head: Normocephalic. Neck:      Vascular: No carotid bruit. Cardiovascular:      Rate and Rhythm: Normal rate and regular rhythm. Pulses: Normal pulses. Heart sounds: Normal heart sounds. No murmur heard. No friction rub. No gallop. Pulmonary:      Effort: Pulmonary effort is normal. No respiratory distress. Breath sounds: Normal breath sounds. No wheezing, rhonchi or rales. Abdominal:      General: Abdomen is flat. There is no distension. Palpations: Abdomen is soft. Tenderness: There is no abdominal tenderness. There is no right CVA tenderness, left CVA tenderness, guarding or rebound. Comments: Neg murphys sign  No pain over m[place]urneys point. Musculoskeletal:      Cervical back: Neck supple. Right lower leg: No edema. Left lower leg: No edema. Skin:     General: Skin is warm. Findings: No erythema or rash. Neurological:      Mental Status: She is alert. Psychiatric:         Mood and Affect: Mood normal.           Assessment:       Diagnosis Orders   1. Fibromyalgia     2. Thyroid nodule     3. Intermittent abdominal pain  Maite Haji MD, Gastroenterology, [place]    H. Pylori Antigen, Stool    Clostridium Difficile Toxin/Antigen    O&P Panel ([facility]) #1    Gastrointestinal Panel by DNA   4. Encounter for immunization  zoster recombinant adjuvanted vaccine [facility]) 50 MCG/0.5ML SUSR injection         Plan:    vc  Saw rheum  Repeat thyroid US in one year, (Due 05/2021)   Suspicious could be GB, get test when you can, call asap should it change   Has to reschedule heart monitor due to insurance (not done)   Will need lipid. IBS? GB? Other? Will do genetic testing prior to starting another medication.    Add nortrypline        Orders Placed This Encounter   Procedures    Clostridium Difficile Toxin/Antigen     Standing Status:   Future Standing Expiration Date:   5/17/2022    O&P Panel ([employer] [facility]) #1     Standing Status:   Future     Standing Expiration Date:   5/17/2022    Gastrointestinal Panel by DNA     Standing Status:   Future     Standing Expiration Date:   5/17/2022    H. Pylori Antigen, Stool     Standing Status:   Future     Standing Expiration Date:   5/17/2022   [facility], Gastroenterology, [facility]     Referral Priority:   Routine     Referral Type:   Eval and Treat     Referral Reason:   Specialty Services Required     Referred to Provider:   Carol Moreno MD     Requested Specialty:   Gastroenterology     Number of Visits Requested:   1     Orders Placed This Encounter   Medications    dicyclomine (BENTYL) 20 MG tablet     Sig: Take 1 tablet by mouth every 6 hours as needed (abdominal cramps)     Dispense:  120 tablet     Refill:  0    zoster recombinant adjuvanted vaccine (SHINGRIX) 50 MCG/0.5ML SUSR injection     Sig: Inject 0.5 mLs into the muscle See Admin Instructions 1 dose now and repeat in 2-6 months     Dispense:  0.5 mL     Refill:  0    omeprazole (PRILOSEC) 40 MG delayed release capsule     Sig: Take 1 capsule by mouth every morning (before breakfast)     Dispense:  30 capsule     Refill:  0   See orders    Patient was offered pregnancy test, she declined, she understands risks of birth defects. Understands notripline can increase SI and to call immediately if this should occur. If anything should change or worsen call ASAP, don't wait for next scheduled appointment. Return in about 4 weeks (around 6/14/2021) for worsening symptoms, call ASAP for appointment, Chronic condition management/appointment.       Isamar Remy MD

## 2021-06-29 ENCOUNTER — TELEPHONE (OUTPATIENT)
Dept: FAMILY MEDICINE CLINIC | Age: 51
End: 2021-06-29

## 2021-06-29 DIAGNOSIS — R30.0 DYSURIA: Primary | ICD-10-CM

## 2021-06-29 RX ORDER — SULFAMETHOXAZOLE AND TRIMETHOPRIM 800; 160 MG/1; MG/1
1 TABLET ORAL 2 TIMES DAILY
Qty: 10 TABLET | Refills: 0 | Status: SHIPPED | OUTPATIENT
Start: 2021-06-29 | End: 2021-06-30 | Stop reason: ALTCHOICE

## 2021-06-29 NOTE — TELEPHONE ENCOUNTER
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 She will not be able to come in for an appt. She is asking if you would send an antibiotic to her pharmacy [facility], for a possible UTI. She is having burning, frequency, urgency, leaking, x 1 day.

## 2021-06-30 ENCOUNTER — OFFICE VISIT (OUTPATIENT)
Dept: FAMILY MEDICINE CLINIC | Age: 51
End: 2021-06-30
Payer: COMMERCIAL

## 2021-06-30 VITALS
WEIGHT: 140.8 LBS | DIASTOLIC BLOOD PRESSURE: 74 MMHG | SYSTOLIC BLOOD PRESSURE: 124 MMHG | HEART RATE: 67 BPM | HEIGHT: 65 IN | BODY MASS INDEX: 23.46 KG/M2 | OXYGEN SATURATION: 100 % | TEMPERATURE: 98 F

## 2021-06-30 DIAGNOSIS — R30.0 DYSURIA: ICD-10-CM

## 2021-06-30 DIAGNOSIS — N30.01 ACUTE CYSTITIS WITH HEMATURIA: Primary | ICD-10-CM

## 2021-06-30 DIAGNOSIS — H92.02 ACUTE OTALGIA, LEFT: ICD-10-CM

## 2021-06-30 LAB
BILIRUBIN, POC: ABNORMAL
BLOOD URINE, POC: ABNORMAL
CLARITY, POC: CLEAR
COLOR, POC: YELLOW
GLUCOSE URINE, POC: ABNORMAL
KETONES, POC: ABNORMAL
LEUKOCYTE EST, POC: ABNORMAL
NITRITE, POC: ABNORMAL
PH, POC: 6
PROTEIN, POC: ABNORMAL
SPECIFIC GRAVITY, POC: 1.01
UROBILINOGEN, POC: 0.2

## 2021-06-30 PROCEDURE — 99213 OFFICE O/P EST LOW 20 MIN: CPT | Performed by: NURSE PRACTITIONER

## 2021-06-30 PROCEDURE — 81002 URINALYSIS NONAUTO W/O SCOPE: CPT | Performed by: NURSE PRACTITIONER

## 2021-06-30 RX ORDER — SULFAMETHOXAZOLE AND TRIMETHOPRIM 800; 160 MG/1; MG/1
1 TABLET ORAL 2 TIMES DAILY
COMMUNITY
End: 2021-08-26 | Stop reason: CLARIF

## 2021-06-30 ASSESSMENT — ENCOUNTER SYMPTOMS
ABDOMINAL PAIN: 0
RHINORRHEA: 0
NAUSEA: 0
TROUBLE SWALLOWING: 0
SINUS PAIN: 0
SORE THROAT: 1
DIARRHEA: 0
SINUS PRESSURE: 1

## 2021-06-30 NOTE — PROGRESS NOTES
Subjective  [address] [facility], 46 y.o. female presents today with:  Chief Complaint   Patient presents with    Urinary Tract Infection     Yesterday, sexually active for 1st time in a while on Sat. pt states that she is having pressure and urgency     URI     ear pain was about 1 wk ago. pt states that she is questioning her sinuses. left side of throat swollen        HPI   Presents to [facility] for urinary and URI symptoms   Recent new sexual partner. Woke up with dysuria  Further developed urgency of urination and suprapubic discomfort   Denies vaginal discharge   Does have small abrasion to outer labia   Denies flank pain   Denies nausea       URI symptoms began recently   Nasal congestion/pressure   Denies mucous   States right ear discomfort occurred last week  Denies cough or chest tightness   Denies chest pain   Tmax 100.8F  Denies diarrhea or loose BM  Denies abdominal cramping   Does have Flonase at home. Hasn't been using   Did have COVID-23 in Jan '21            Past Medical History:   Diagnosis Date    Abnormal Pap smear of cervix     Depression     Depression with anxiety     Depression with anxiety     Iron deficiency anemia 4/12/2019    [address] [address] 05/2016    Idiopathic she is still in process     Thyroid disease       Past Surgical History:   Procedure Laterality Date    BLADDER SURGERY      done by Dr. IDANIA Burciaga 23 x10 yrs    COLONOSCOPY  03/04/2019    KERRY AMADO MD    DILATION AND CURETTAGE      ENDOMETRIAL ABLATION      TONSILLECTOMY       No family history on file. Review of Systems   Constitutional: Positive for activity change, appetite change, fatigue and fever. Negative for chills and diaphoresis. HENT: Positive for congestion, ear pain, postnasal drip, sinus pressure and sore throat. Negative for ear discharge, rhinorrhea, sinus pain and trouble swallowing. Gastrointestinal: Negative for abdominal pain, diarrhea and nausea.    Genitourinary: Positive for dysuria, frequency and urgency. Negative for decreased urine volume, difficulty urinating, flank pain, hematuria, pelvic pain, vaginal bleeding, vaginal discharge and vaginal pain. Musculoskeletal: Negative for myalgias, neck pain and neck stiffness. Neurological: Negative for dizziness, light-headedness and headaches. PMH, Surgical Hx, Family Hx, and Social Hx reviewed and updated. Health Maintenance reviewed. Objective  Vitals:    06/30/21 0924   BP: 124/74   Pulse: 67   Temp: 98 °F (36.7 °C)   SpO2: 100%   Weight: 140 lb 12.8 oz (63.9 kg)   Height: 5' 5\" (1.651 m)     BP Readings from Last 3 Encounters:   06/30/21 124/74   05/17/21 122/76   04/05/21 108/70     Wt Readings from Last 3 Encounters:   06/30/21 140 lb 12.8 oz (63.9 kg)   05/17/21 143 lb (64.9 kg)   04/05/21 146 lb (66.2 kg)           Physical Exam  Vitals reviewed. Constitutional:       Appearance: She is ill-appearing. HENT:      Right Ear: Hearing, tympanic membrane, ear canal and external ear normal.      Left Ear: Hearing, tympanic membrane, ear canal and external ear normal.      Nose: Congestion present. Right Sinus: No maxillary sinus tenderness or frontal sinus tenderness. Left Sinus: No maxillary sinus tenderness or frontal sinus tenderness. Mouth/Throat:      Lips: Pink. Mouth: Mucous membranes are moist.      Pharynx: Uvula midline. Oropharyngeal exudate present. No pharyngeal swelling, posterior oropharyngeal erythema or uvula swelling. Eyes:      Conjunctiva/sclera: Conjunctivae normal.   Cardiovascular:      Rate and Rhythm: Normal rate. Pulmonary:      Effort: Pulmonary effort is normal.      Breath sounds: Normal breath sounds. Abdominal:      General: There is no distension. Palpations: Abdomen is soft. Tenderness: There is no abdominal tenderness. There is no right CVA tenderness, left CVA tenderness or guarding. Musculoskeletal:         General: Normal range of motion.       Cervical back: Normal range of motion. Skin:     General: Skin is warm and dry. Capillary Refill: Capillary refill takes less than 2 seconds. Coloration: Skin is not pale. Neurological:      General: No focal deficit present. Mental Status: She is alert and oriented to person, place, and time. Psychiatric:         Mood and Affect: Mood normal.             Assessment & Plan    Diagnosis Orders   1. Acute cystitis with hematuria  Culture, Urine   2. Dysuria  Culture, Urine    POCT Urinalysis no Micro   3. Acute otalgia, left       Orders Placed This Encounter   Procedures    Culture, Urine     Standing Status:   Future     Number of Occurrences:   1     Standing Expiration Date:   6/30/2022     Order Specific Question:   Specify (ex-cath, midstream, cysto, etc)? Answer:   midstream    POCT Urinalysis no Micro     No orders of the defined types were placed in this encounter. Return if symptoms worsen or fail to improve. Reviewed with the patient: current clinical status & medications. Side effects, adverse effects of themedication prescribed today, as well as treatment plan/ rationale and result expectations have been discussed with the patient who expressed understanding and desires to proceed. Will update patient with urine culture when it is available. How can you care for yourself at home? · Take your antibiotics as directed. Do not stop taking them just because you feel better. You need to take the full course of antibiotics. · Drink extra water and other fluids for the next day or two. This may help wash out the bacteria that are causing the infection. (If you have kidney, heart, or liver disease and have to limit fluids, talk with your doctor before you increase your fluid intake.)  · Avoid drinks that are carbonated or have caffeine. They can irritate the bladder. · Urinate often. Try to empty your bladder each time.   · To relieve pain, take a hot bath or lay a heating pad set on low over your lower belly or genital area. Never go to sleep with a heating pad in place. To prevent UTIs  · Drink plenty of water each day. This helps you urinate often, which clears bacteria from your system. (If you have kidney, heart, or liver disease and have to limit fluids, talk with your doctor before you increase your fluid intake.)  · Urinate when you need to. · Urinate right after you have sex. · Change sanitary pads often. · Avoid douches, bubble baths, feminine hygiene sprays, and other feminine hygiene products that have deodorants. · After going to the bathroom, wipe from front to back. When should you call for help? Call your doctor now or seek immediate medical care if:    · Symptoms such as fever, chills, nausea, or vomiting get worse or appear for the first time. · You have new pain in your back just below your rib cage. This is called flank pain. · There is new blood or pus in your urine. · You have any problems with your antibiotic medicine. Watch closely for changes in your health, and be sure to contact your doctor if:    · Your symptoms go away but then come back. Close follow up to evaluate treatment results and for coordination of care. I have reviewed the patient's medical history in detail and updated the computerized patient record.     TANIYA Guallpa NP

## 2021-06-30 NOTE — PATIENT INSTRUCTIONS
Patient Education        Urinary Tract Infection (UTI) in Women: Care Instructions  Overview     A urinary tract infection, or UTI, is a general term for an infection anywhere between the kidneys and the urethra (where urine comes out). Most UTIs are bladder infections. They often cause pain or burning when you urinate. UTIs are caused by bacteria and can be cured with antibiotics. Be sure to complete your treatment so that the infection does not get worse. Follow-up care is a key part of your treatment and safety. Be sure to make and go to all appointments, and call your doctor if you are having problems. It's also a good idea to know your test results and keep a list of the medicines you take. How can you care for yourself at home? · Take your antibiotics as directed. Do not stop taking them just because you feel better. You need to take the full course of antibiotics. · Drink extra water and other fluids for the next day or two. This will help make the urine less concentrated and help wash out the bacteria that are causing the infection. (If you have kidney, heart, or liver disease and have to limit fluids, talk with your doctor before you increase the amount of fluids you drink.)  · Avoid drinks that are carbonated or have caffeine. They can irritate the bladder. · Urinate often. Try to empty your bladder each time. · To relieve pain, take a hot bath or lay a heating pad set on low over your lower belly or genital area. Never go to sleep with a heating pad in place. To prevent UTIs  · Drink plenty of water each day. This helps you urinate often, which clears bacteria from your system. (If you have kidney, heart, or liver disease and have to limit fluids, talk with your doctor before you increase the amount of fluids you drink.)  · Urinate when you need to. · If you are sexually active, urinate right after you have sex. · Change sanitary pads often.   · Avoid douches, bubble baths, feminine hygiene sprays, and other feminine hygiene products that have deodorants. · After going to the bathroom, wipe from front to back. When should you call for help? Call your doctor now or seek immediate medical care if:    · Symptoms such as fever, chills, nausea, or vomiting get worse or appear for the first time.     · You have new pain in your back just below your rib cage. This is called flank pain.     · There is new blood or pus in your urine.     · You have any problems with your antibiotic medicine. Watch closely for changes in your health, and be sure to contact your doctor if:    · You are not getting better after taking an antibiotic for 2 days.     · Your symptoms go away but then come back. Where can you learn more? Go to https://[employer]. org and sign in to your [employer] account. Enter I624 in the [employer] box to learn more about \"Urinary Tract Infection (UTI) in Women: Care Instructions. \"     If you do not have an account, please click on the \"Sign Up Now\" link. Current as of: February 10, 2021               Content Version: 12.9  © 3790-1686 [employer]wise, [employer]orporated. Care instructions adapted under license by [facility] ([facility]). If you have questions about a medical condition or this instruction, always ask your healthcare professional. [address] any warranty or liability for your use of this information.

## 2021-07-01 ENCOUNTER — OFFICE VISIT (OUTPATIENT)
Dept: FAMILY MEDICINE CLINIC | Age: 51
End: 2021-07-01
Payer: COMMERCIAL

## 2021-07-01 VITALS
HEART RATE: 65 BPM | SYSTOLIC BLOOD PRESSURE: 122 MMHG | WEIGHT: 140 LBS | BODY MASS INDEX: 23.32 KG/M2 | TEMPERATURE: 98.1 F | HEIGHT: 65 IN | OXYGEN SATURATION: 96 % | DIASTOLIC BLOOD PRESSURE: 78 MMHG

## 2021-07-01 DIAGNOSIS — J30.9 ALLERGIC RHINITIS, UNSPECIFIED SEASONALITY, UNSPECIFIED TRIGGER: ICD-10-CM

## 2021-07-01 DIAGNOSIS — R59.0 LAD (LYMPHADENOPATHY) OF LEFT CERVICAL REGION: Primary | ICD-10-CM

## 2021-07-01 LAB — HETEROPHILE ANTIBODIES: NEGATIVE

## 2021-07-01 PROCEDURE — 99213 OFFICE O/P EST LOW 20 MIN: CPT | Performed by: PHYSICIAN ASSISTANT

## 2021-07-01 PROCEDURE — 86308 HETEROPHILE ANTIBODY SCREEN: CPT | Performed by: PHYSICIAN ASSISTANT

## 2021-07-01 ASSESSMENT — ENCOUNTER SYMPTOMS
RHINORRHEA: 0
EYES NEGATIVE: 1
RESPIRATORY NEGATIVE: 1
SORE THROAT: 0
BACK PAIN: 1
SINUS PRESSURE: 0
TROUBLE SWALLOWING: 0
SINUS PAIN: 0

## 2021-07-01 NOTE — PROGRESS NOTES
Subjective  Connie Bee Rubalcava, 46 y.o. female presents today with:  Chief Complaint   Patient presents with   Mejia Xiao to [address] for UTI, but noticed a swollen gland on left side of face. She states she's not feeling well. Face feels tender & giving her an ear ache on left side. Not red, warm to touch and no discharged. Took Tylenol which took the edge off. HPI  Patient being seen acutely for an enlarged cervical neck lymph node which she noticed this morning she has been on Bactrim for the past 2 days for diagnosis of UTI  Had chills 2 days ago c/o  postnasal drip drainage but denies sore throat,  sinus pain ear pain chest congestion, dysphagia/odynophagia  Pt has h.o of mono and would like to be tested     Review of Systems   Constitutional: Positive for chills, fatigue and fever. HENT: Positive for congestion, ear pain and postnasal drip. Negative for rhinorrhea, sinus pressure, sinus pain, sore throat and trouble swallowing. Eyes: Negative. Respiratory: Negative. Cardiovascular: Negative. Genitourinary:        Sxs improved with bactrim   Musculoskeletal: Positive for back pain. Allergic/Immunologic: Positive for environmental allergies. Neurological: Negative. Psychiatric/Behavioral: Positive for sleep disturbance. All other systems reviewed and are negative.         Past Medical History:   Diagnosis Date    Abnormal Pap smear of cervix     Depression     Depression with anxiety     Depression with anxiety     Iron deficiency anemia 4/12/2019    Stroke ([facility] [address] 05/2016    Idiopathic she is still in process     Thyroid disease      Past Surgical History:   Procedure Laterality Date    BLADDER SURGERY      done by Dr. IDANIA Burciaga 23 x10 yrs    COLONOSCOPY  03/04/2019    KERRY AMADO MD    DILATION AND CURETTAGE      ENDOMETRIAL ABLATION      TONSILLECTOMY       Social History     Socioeconomic History    Marital status:      Spouse name: Not on file    Number of children: Not on file    Years of education: Not on file    Highest education level: Not on file   Occupational History    Not on file   Tobacco Use    Smoking status: Former Smoker     Types: Cigarettes     Start date: 1988     Quit date: 1990     Years since quittin.0    Smokeless tobacco: Never Used   Vaping Use    Vaping Use: Never used   Substance and Sexual Activity    Alcohol use: Yes     Comment: ocassionally    Drug use: No    Sexual activity: Not Currently   Other Topics Concern    Not on file   Social History Narrative    Not on file     Social Determinants of Health     Financial Resource Strain: Low Risk     Difficulty of Paying Living Expenses: Not hard at all   Food Insecurity: No Food Insecurity    Worried About [address] in the Last Year: Never true    [address] in the Last Year: Never true   Transportation Needs: No Transportation Needs    Lack of Transportation (Medical): No    Lack of Transportation (Non-Medical): No   Physical Activity:     Days of Exercise per Week:     Minutes of Exercise per Session:    Stress:     Feeling of Stress :    Social Connections:     Frequency of Communication with Friends and Family:     Frequency of Social Gatherings with Friends and Family:     Attends Mosque Services:     Active Member of Clubs or Organizations:     Attends Club or Organization Meetings:     Marital Status:    Intimate Partner Violence:     Fear of Current or Ex-Partner:     Emotionally Abused:     Physically Abused:     Sexually Abused:      No family history on file.      Allergies   Allergen Reactions    Amoxicillin Hives    Nsaids Other (See Comments)     STROKE/NO NSAIDS ALLOWED    Pcn [Penicillins] Rash     Current Outpatient Medications   Medication Sig Dispense Refill    sulfamethoxazole-trimethoprim (BACTRIM DS;SEPTRA DS) 800-160 MG per tablet Take 1 tablet by mouth 2 times daily      levothyroxine (SYNTHROID) 100 MCG tablet Take 1 tablet by mouth daily 90 tablet 3    aspirin 81 MG tablet Take 81 mg by mouth 2 times daily      dicyclomine (BENTYL) 20 MG tablet Take 1 tablet by mouth every 6 hours as needed (abdominal cramps) (Patient not taking: Reported on 6/30/2021) 120 tablet 0    zoster recombinant adjuvanted vaccine [facility]) 50 MCG/0.5ML SUSR injection Inject 0.5 mLs into the muscle See Admin Instructions 1 dose now and repeat in 2-6 months (Patient not taking: Reported on 6/30/2021) 0.5 mL 0    omeprazole (PRILOSEC) 40 MG delayed release capsule Take 1 capsule by mouth every morning (before breakfast) (Patient not taking: Reported on 6/30/2021) 30 capsule 0     No current facility-administered medications for this visit. Objective    Vitals:    07/01/21 1125   BP: 122/78   Pulse: 65   Temp: 98.1 °F (36.7 °C)   TempSrc: Oral   SpO2: 96%   Weight: 140 lb (63.5 kg)   Height: 5' 5\" (1.651 m)     Physical Exam  Constitutional:       General: She is not in acute distress. Appearance: Normal appearance. She is not ill-appearing. HENT:      Head: Normocephalic and atraumatic. Ears:      Comments: Middle ear effusion bilat     Nose: No congestion. Mouth/Throat:      Mouth: Mucous membranes are moist.      Pharynx: Oropharynx is clear. No oropharyngeal exudate or posterior oropharyngeal erythema. Eyes:      Extraocular Movements: Extraocular movements intact. Conjunctiva/sclera: Conjunctivae normal.      Pupils: Pupils are equal, round, and reactive to light. Neck:      Thyroid: No thyromegaly. Comments: Left submandibular  Cardiovascular:      Rate and Rhythm: Normal rate and regular rhythm. Heart sounds: Normal heart sounds. No murmur heard. Pulmonary:      Effort: No respiratory distress. Breath sounds: Normal breath sounds. No wheezing. Musculoskeletal:         General: Normal range of motion. Cervical back: Normal range of motion and neck supple.    Lymphadenopathy: Cervical: Cervical adenopathy present. Skin:     General: Skin is warm and dry. Coloration: Skin is not jaundiced or pale. Neurological:      General: No focal deficit present. Mental Status: She is alert and oriented to person, place, and time. Cranial Nerves: No cranial nerve deficit. Motor: No weakness. Coordination: Coordination normal.      Gait: Gait normal.   Psychiatric:         Mood and Affect: Mood is anxious. Behavior: Behavior is cooperative. Thought Content: Thought content normal.         Judgment: Judgment normal.              Assessment & Plan    Diagnosis Orders   1. LAD (lymphadenopathy) of left cervical region      neg mono - complete antibx     2. Allergic rhinitis, unspecified seasonality, unspecified trigger      continue flonase and oral antihistamin as directed           No orders of the defined types were placed in this encounter. No orders of the defined types were placed in this encounter. There are no discontinued medications. Return for follow up with your PCP as directed.   rto if sxs do not improve or worsen  Annette Longoria PA-C

## 2021-07-07 ENCOUNTER — TELEPHONE (OUTPATIENT)
Dept: FAMILY MEDICINE CLINIC | Age: 51
End: 2021-07-07

## 2021-07-07 NOTE — TELEPHONE ENCOUNTER
SEE BELOW  She should have them address this and see if repeat urine needed, etc.   Please address to walk in clinic provider.    I did not see her for this

## 2021-08-10 ENCOUNTER — NURSE TRIAGE (OUTPATIENT)
Dept: OTHER | Facility: CLINIC | Age: 51
End: 2021-08-10

## 2021-08-10 ENCOUNTER — OFFICE VISIT (OUTPATIENT)
Dept: FAMILY MEDICINE CLINIC | Age: 51
End: 2021-08-10
Payer: COMMERCIAL

## 2021-08-10 VITALS
HEART RATE: 74 BPM | OXYGEN SATURATION: 97 % | DIASTOLIC BLOOD PRESSURE: 82 MMHG | HEIGHT: 65 IN | WEIGHT: 140 LBS | BODY MASS INDEX: 23.32 KG/M2 | SYSTOLIC BLOOD PRESSURE: 122 MMHG | TEMPERATURE: 96.9 F

## 2021-08-10 DIAGNOSIS — J01.90 ACUTE BACTERIAL SINUSITIS: ICD-10-CM

## 2021-08-10 DIAGNOSIS — J02.9 SORE THROAT: ICD-10-CM

## 2021-08-10 DIAGNOSIS — B96.89 ACUTE BACTERIAL SINUSITIS: ICD-10-CM

## 2021-08-10 LAB
BILIRUBIN, POC: NORMAL
BLOOD URINE, POC: NORMAL
CLARITY, POC: CLEAR
COLOR, POC: NORMAL
GLUCOSE URINE, POC: NORMAL
KETONES, POC: NORMAL
LEUKOCYTE EST, POC: NORMAL
Lab: NORMAL
NITRITE, POC: NORMAL
PERFORMING INSTRUMENT: NORMAL
PH, POC: 6
PROTEIN, POC: NORMAL
QC PASS/FAIL: NORMAL
SARS-COV-2, POC: NORMAL
SPECIFIC GRAVITY, POC: 1.01
UROBILINOGEN, POC: NORMAL

## 2021-08-10 PROCEDURE — 81003 URINALYSIS AUTO W/O SCOPE: CPT | Performed by: PHYSICIAN ASSISTANT

## 2021-08-10 PROCEDURE — 87426 SARSCOV CORONAVIRUS AG IA: CPT | Performed by: PHYSICIAN ASSISTANT

## 2021-08-10 PROCEDURE — 99213 OFFICE O/P EST LOW 20 MIN: CPT | Performed by: PHYSICIAN ASSISTANT

## 2021-08-10 ASSESSMENT — PATIENT HEALTH QUESTIONNAIRE - PHQ9
1. LITTLE INTEREST OR PLEASURE IN DOING THINGS: 0
2. FEELING DOWN, DEPRESSED OR HOPELESS: 0
SUM OF ALL RESPONSES TO PHQ QUESTIONS 1-9: 0
SUM OF ALL RESPONSES TO PHQ9 QUESTIONS 1 & 2: 0

## 2021-08-10 ASSESSMENT — ENCOUNTER SYMPTOMS
TROUBLE SWALLOWING: 0
CHEST TIGHTNESS: 0
DIARRHEA: 0
SHORTNESS OF BREATH: 0
SINUS PRESSURE: 1
VOMITING: 0
ABDOMINAL PAIN: 0
BACK PAIN: 0
COUGH: 0
SINUS PAIN: 1

## 2021-08-10 NOTE — LETTER
[facility]  [address] [facility] [address]  Phone: [phone]  Fax: [phone], [address]        August 10, 2021     Patient: Addy Camargo   YOB: 1970   Date of Visit: 8/10/2021       To Whom it May Concern:    Flip Napoles was seen in my clinic on 8/10/2021. She may return to work on 8/12/21 with no restrictions. Please excuse her during this time as she was being treated under my care. .    If you have any questions or concerns, please don't hesitate to call.     Sincerely,         MIREYA Salcedo

## 2021-08-10 NOTE — TELEPHONE ENCOUNTER
Received call from [address] [place] [address] at [facility] with Red Flag Complaint. Brief description of triage: exposed to COVID over the weekend, has had headache, dizziness, and fatigue. Wants to be seen today. History of stroke. Triage indicates for patient to call PCP now. Advised patient to be seen within 4 hours. [facility] no [facility] available today but walk in clinic available. Patient states she will go to walk in clinic. Care advice provided, patient verbalizes understanding; denies any other questions or concerns; instructed to call back for any new or worsening symptoms. Attention Provider: Thank you for allowing me to participate in the care of your patient. The patient was connected to triage in response to information provided to the [facility]. Please do not respond through this encounter as the response is not directed to a shared pool. Reason for Disposition   [1] HIGH RISK patient (e.g., age > 59 years, diabetes, heart or lung disease, weak immune system) AND [2] new or worsening symptoms    Answer Assessment - Initial Assessment Questions  1. COVID-19 DIAGNOSIS: \"Who made your Coronavirus (COVID-19) diagnosis? \" \"Was it confirmed by a positive lab test?\" If not diagnosed by a HCP, ask \"Are there lots of cases (community spread) where you live? \" (See public health department website, if unsure)      No diagnosis yet    2. COVID-19 EXPOSURE: \"Was there any known exposure to COVID before the symptoms began? \" CDC Definition of close contact: within 6 feet (2 meters) for a total of 15 minutes or more over a 24-hour period. Yes over the weekend    3. ONSET: \"When did the COVID-19 symptoms start? \"       Sunday     4. WORST SYMPTOM: \"What is your worst symptom? \" (e.g., cough, fever, shortness of breath, muscle aches)      Headache and feeling \"off\"    5. COUGH: \"Do you have a cough? \" If so, ask: \"How bad is the cough? \"        Yes, not terrible    6.  FEVER: \"Do you have a fever? \" If so, ask: \"What is your temperature, how was it measured, and when did it start? \"      No but gets hot and sweaty    7. RESPIRATORY STATUS: \"Describe your breathing? \" (e.g., shortness of breath, wheezing, unable to speak)       Fine    8. BETTER-SAME-WORSE: Sara Fluke you getting better, staying the same or getting worse compared to yesterday? \"  If getting worse, ask, \"In what way? \"      About the same    9. HIGH RISK DISEASE: \"Do you have any chronic medical problems? \" (e.g., asthma, heart or lung disease, weak immune system, obesity, etc.)      History of stroke    10. PREGNANCY: \"Is there any chance you are pregnant? \" \"When was your last menstrual period? \"        N/A    11. OTHER SYMPTOMS: \"Do you have any other symptoms? \"  (e.g., chills, fatigue, headache, loss of smell or taste, muscle pain, sore throat; new loss of smell or taste especially support the diagnosis of COVID-19)        Headache, dizziness, sleeping a lot    Protocols used: CORONAVIRUS (COVID-19) DIAGNOSED OR SUSPECTED-ADULT-AH

## 2021-08-10 NOTE — PROGRESS NOTES
Subjective:      Patient ID: Lori Wilkerson is a 46 y.o. female who presents today for:  Chief Complaint   Patient presents with    Post-COVID Symptoms     Patient is here c/o post-COVID symptoms. Pt states she has headaches, dizziness, fatigue, sweats, and scratchy throat. Pt states she had a fever on , but temp has gone down since then. Pt states symtoms have been ongoing since . Pt states she ha s used OTC allergy medication with minimal relief. HPI45 year old female who presents with sinus pressure and pain with a scratchy throat for the past 2 days. She did have a fever and fatigue two days ago. She does have complaints of a headache. She does have lower back pain and was recently treated for a UTI. Having baltazar horses at night recently. She was prescribed Erythromycin for a sinus infection and has taken three doses all ready.     Past Medical History:   Diagnosis Date    Abnormal Pap smear of cervix     Depression     Depression with anxiety     Depression with anxiety     Iron deficiency anemia 2019    Stroke ([address] 2016    Idiopathic she is still in process     Thyroid disease      Past Surgical History:   Procedure Laterality Date    BLADDER SURGERY      [facility] by [facility] x10 yrs    COLONOSCOPY  2019    KERRY AMADO MD    DILATION AND CURETTAGE      ENDOMETRIAL ABLATION      TONSILLECTOMY       Social History     Socioeconomic History    Marital status:      Spouse name: Not on file    Number of children: Not on file    Years of education: Not on file    Highest education level: Not on file   Occupational History    Not on file   Tobacco Use    Smoking status: Former Smoker     Types: Cigarettes     Start date: 1988     Quit date: 1990     Years since quittin.1    Smokeless tobacco: Never Used   Vaping Use    Vaping Use: Never used   Substance and Sexual Activity    Alcohol use: Yes     Comment: ocassionally    Drug use: No    Sexual activity: Not Currently   Other Topics Concern    Not on file   Social History Narrative    Not on file     Social Determinants of Health     Financial Resource Strain: Low Risk     Difficulty of Paying Living Expenses: Not hard at all   Food Insecurity: No Food Insecurity    Worried About Running Out of Food in the Last Year: Never true    Jose Antonio of Food in the Last Year: Never true   Transportation Needs: No Transportation Needs    Lack of Transportation (Medical): No    Lack of Transportation (Non-Medical): No   Physical Activity:     Days of Exercise per Week:     Minutes of Exercise per Session:    Stress:     Feeling of Stress :    Social Connections:     Frequency of Communication with Friends and Family:     Frequency of Social Gatherings with Friends and Family:     Attends Cheondoism Services:     Active Member of Clubs or Organizations:     Attends Club or Organization Meetings:     Marital Status:    Intimate Partner Violence:     Fear of Current or Ex-Partner:     Emotionally Abused:     Physically Abused:     Sexually Abused:      No family history on file.   Allergies   Allergen Reactions    Amoxicillin Hives    Nsaids Other (See Comments)     STROKE/NO NSAIDS ALLOWED    Pcn [Penicillins] Rash     Current Outpatient Medications   Medication Sig Dispense Refill    sulfamethoxazole-trimethoprim (BACTRIM DS;SEPTRA DS) 800-160 MG per tablet Take 1 tablet by mouth 2 times daily      zoster recombinant adjuvanted vaccine (SHINGRIX) 50 MCG/0.5ML SUSR injection Inject 0.5 mLs into the muscle See Admin Instructions 1 dose now and repeat in 2-6 months 0.5 mL 0    levothyroxine (SYNTHROID) 100 MCG tablet Take 1 tablet by mouth daily 90 tablet 3    aspirin 81 MG tablet Take 81 mg by mouth 2 times daily      dicyclomine (BENTYL) 20 MG tablet Take 1 tablet by mouth every 6 hours as needed (abdominal cramps) (Patient not taking: Reported on 6/30/2021) 120 tablet 0    omeprazole (PRILOSEC) 40 MG delayed release capsule Take 1 capsule by mouth every morning (before breakfast) (Patient not taking: Reported on 6/30/2021) 30 capsule 0     No current facility-administered medications for this visit. Review of Systems   Constitutional: Positive for fatigue and fever. Negative for activity change, appetite change, chills and unexpected weight change. HENT: Positive for congestion, sinus pressure and sinus pain. Negative for drooling, ear pain, nosebleeds and trouble swallowing. Respiratory: Negative for cough, chest tightness and shortness of breath. Cardiovascular: Negative for chest pain and leg swelling. Gastrointestinal: Negative for abdominal pain, diarrhea and vomiting. Endocrine: Negative for polydipsia and polyphagia. Genitourinary: Negative for dysuria, flank pain and frequency. Musculoskeletal: Negative for back pain and myalgias. Skin: Negative for pallor and rash. Neurological: Negative for syncope, weakness and headaches. Hematological: Does not bruise/bleed easily. Psychiatric/Behavioral: Negative for agitation, behavioral problems and confusion. All other systems reviewed and are negative. Objective:   /82 (Site: Right Upper Arm, Position: Sitting, Cuff Size: Small Adult)   Pulse 74   Temp 96.9 °F (36.1 °C)   Ht 5' 5\" (1.651 m)   Wt 140 lb (63.5 kg)   LMP 03/18/2019 (Approximate)   SpO2 97%   BMI 23.30 kg/m²     Physical Exam  Vitals and nursing note reviewed. Constitutional:       General: She is awake. She is not in acute distress. Appearance: Normal appearance. She is well-developed and normal weight. She is not ill-appearing, toxic-appearing or diaphoretic. Comments: No photophobia. No phonophobia. HENT:      Head: Normocephalic and atraumatic. No Keen's sign. Right Ear: External ear normal. Tympanic membrane is erythematous. Left Ear: External ear normal. Tympanic membrane is not erythematous. Nose: Nose normal. No congestion or rhinorrhea. Mouth/Throat:      Mouth: Mucous membranes are moist.      Pharynx: Oropharynx is clear. Posterior oropharyngeal erythema present. No oropharyngeal exudate. Eyes:      General: No scleral icterus. Right eye: No foreign body or discharge. Left eye: No discharge. Extraocular Movements: Extraocular movements intact. Conjunctiva/sclera: Conjunctivae normal.      Left eye: No exudate. Pupils: Pupils are equal, round, and reactive to light. Neck:      Vascular: No JVD. Trachea: No tracheal deviation. Comments: No meningismus. Cardiovascular:      Rate and Rhythm: Normal rate and regular rhythm. Heart sounds: Normal heart sounds. Heart sounds not distant. No murmur heard. No friction rub. No gallop. Pulmonary:      Effort: Pulmonary effort is normal. No respiratory distress. Breath sounds: Normal breath sounds. No stridor. No wheezing, rhonchi or rales. Chest:      Chest wall: No tenderness. Abdominal:      General: Abdomen is flat. Bowel sounds are normal. There is no distension. Palpations: Abdomen is soft. There is no mass. Tenderness: There is no abdominal tenderness. There is no right CVA tenderness, left CVA tenderness, guarding or rebound. Hernia: No hernia is present. Musculoskeletal:         General: No swelling, tenderness, deformity or signs of injury. Normal range of motion. Cervical back: Normal range of motion and neck supple. No rigidity. Lymphadenopathy:      Head:      Right side of head: No submental adenopathy. Left side of head: No submental adenopathy. Skin:     General: Skin is warm and dry. Capillary Refill: Capillary refill takes less than 2 seconds. Coloration: Skin is not jaundiced or pale. Findings: No bruising, erythema, lesion or rash. Neurological:      General: No focal deficit present.       Mental Status: She is alert and oriented to person, place, and time. Mental status is at baseline. Cranial Nerves: No cranial nerve deficit. Sensory: No sensory deficit. Motor: No weakness. Coordination: Coordination normal.      Deep Tendon Reflexes: Reflexes are normal and symmetric. Psychiatric:         Mood and Affect: Mood normal.         Behavior: Behavior normal. Behavior is cooperative. Thought Content: Thought content normal.         Judgment: Judgment normal.         Assessment:       Diagnosis Orders   1. Sore throat  POCT COVID-19, Antigen     No results found for this visit on 08/10/21. Plan:     Assessment & Plan   Cherie was seen today for post-covid symptoms. Diagnoses and all orders for this visit:    Sore throat  -     POCT COVID-19, Antigen      Orders Placed This Encounter   Procedures    POCT COVID-19, Antigen     Order Specific Question:   Is this test for diagnosis or screening? Answer:   Screening     Order Specific Question:   Symptomatic for COVID-19 as defined by CDC? Answer:   No     Order Specific Question:   Date of Symptom Onset     Answer:   N/A     Order Specific Question:   Hospitalized for COVID-19? Answer:   No     Order Specific Question:   Admitted to ICU for COVID-19? Answer:   No     Order Specific Question:   Employed in healthcare setting? Answer:   Unknown     Order Specific Question:   Resident in a congregate (group) care setting? Answer:   Unknown     Order Specific Question:   Pregnant? Answer:   No     Order Specific Question:   Previously tested for COVID-19? Answer:   Yes     No orders of the defined types were placed in this encounter. There are no discontinued medications. No follow-ups on file. Reviewed with the patient/family: current clinical status & medications.   Side effects of the medication prescribed today, as well as treatment plan/rationale and result expectations have been discussed with the patient/family who expresses understanding. Patient will be discharged home in stable condition. Follow up with PCP to evaluate treatment results or return if symptoms worsen or fail to improve. Discussed signs and symptoms which require immediate follow-up in ED/call to 911. Understanding verbalized. I have reviewed the patient's medical history in detail and updated the computerized patient record.     MIREYA Esposito

## 2021-08-19 ENCOUNTER — TELEPHONE (OUTPATIENT)
Dept: FAMILY MEDICINE CLINIC | Age: 51
End: 2021-08-19

## 2021-08-19 NOTE — TELEPHONE ENCOUNTER
----- Message from Gagan Cabralesman sent at 8/17/2021  1:00 PM EDT -----  Subject: Appointment Request    Reason for Call: Routine Physical Exam    QUESTIONS  Type of Appointment? Established Patient  Reason for appointment request? Other - Sorry, the appointment cannot be   booked with the [address] [place] [address],[address] for this provider. Please send a message to the provider. Additional Information for Provider? Orachapo Hudson would like an appt for a physical   & if scheduled in Sept needs a Wed appt only. 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 She was   wondering as well if an ultra sound was to be done as a f/u on the left   side of her neck. Dr. Adair Vega had mentioned that on previous one he did. Please contact Crow Treviño with avail appts. ---------------------------------------------------------------------------  --------------  [employer] INFO  What is the best way for the office to contact you? OK to leave message on   voicemail  Preferred Call Back Phone Number? [phone]  ---------------------------------------------------------------------------  --------------  SCRIPT ANSWERS  Relationship to Patient? Self  (If the patient has Medicare as their primary insurance coverage ask this   question) Are you requesting a Medicare Annual Wellness Visit? No  (Is the patient requesting a pap smear with their physical exam?)? No  (Is the patient requesting their annual physical and does not need PAP or   AWV per above?)? Yes   Have you been diagnosed with, awaiting test results for, or told that you   are suspected of having COVID-19 (Coronavirus)? (If patient has tested   negative or was tested as a requirement for work, school, or travel and   not based on symptoms, answer no)? No  Do you currently have flu-like symptoms including fever or chills, cough,   shortness of breath, difficulty breathing, or new loss of taste or smell? No  Have you had close contact with someone with COVID-19 in the last 14 days? No  (Service Expert - click yes below to proceed with [facility] As Usual   Scheduling)?  Yes RD to monitor energy intake, diet order, body comp, NFPF, glucose profile/yes

## 2021-08-26 ENCOUNTER — OFFICE VISIT (OUTPATIENT)
Dept: FAMILY MEDICINE CLINIC | Age: 51
End: 2021-08-26
Payer: COMMERCIAL

## 2021-08-26 DIAGNOSIS — E04.1 THYROID NODULE: ICD-10-CM

## 2021-08-26 DIAGNOSIS — R42 DIZZINESS: ICD-10-CM

## 2021-08-26 DIAGNOSIS — M25.561 CHRONIC PAIN OF RIGHT KNEE: ICD-10-CM

## 2021-08-26 DIAGNOSIS — G89.29 CHRONIC PAIN OF RIGHT KNEE: ICD-10-CM

## 2021-08-26 DIAGNOSIS — M25.551 RIGHT HIP PAIN: ICD-10-CM

## 2021-08-26 DIAGNOSIS — R41.3 MEMORY LOSS: Primary | ICD-10-CM

## 2021-08-26 DIAGNOSIS — M25.50 ARTHRALGIA, UNSPECIFIED JOINT: ICD-10-CM

## 2021-08-26 DIAGNOSIS — M54.50 CHRONIC LOW BACK PAIN, UNSPECIFIED BACK PAIN LATERALITY, UNSPECIFIED WHETHER SCIATICA PRESENT: ICD-10-CM

## 2021-08-26 DIAGNOSIS — G89.29 CHRONIC LOW BACK PAIN, UNSPECIFIED BACK PAIN LATERALITY, UNSPECIFIED WHETHER SCIATICA PRESENT: ICD-10-CM

## 2021-08-26 PROCEDURE — 99215 OFFICE O/P EST HI 40 MIN: CPT | Performed by: INTERNAL MEDICINE

## 2021-08-26 RX ORDER — CETIRIZINE HYDROCHLORIDE 10 MG/1
10 TABLET ORAL DAILY
COMMUNITY
End: 2022-01-04

## 2021-08-26 RX ORDER — MOMETASONE FUROATE 1 MG/G
CREAM TOPICAL
Qty: 45 G | Refills: 0 | Status: SHIPPED | OUTPATIENT
Start: 2021-08-26 | End: 2021-09-17

## 2021-08-26 RX ORDER — FLUTICASONE PROPIONATE 50 MCG
1 SPRAY, SUSPENSION (ML) NASAL DAILY PRN
COMMUNITY

## 2021-08-26 ASSESSMENT — ENCOUNTER SYMPTOMS
SHORTNESS OF BREATH: 0
ABDOMINAL PAIN: 0
BACK PAIN: 0
EYE PAIN: 0

## 2021-08-26 NOTE — PROGRESS NOTES
Subjective:      Patient ID: Nereida Osorio is a 46 y.o. female who presents today with:  Chief Complaint   Patient presents with    3 Month Follow-Up    Discuss Labs       HPI    Mentions joins pains are worse  Not seeing rheum anymore   Mentions worsening dizziness  Forgot where she was  Was off balance  Keep tripping when walking  Couple of months. Years worth of joint pains but getting worse. No recent falls or trauma  Right knee pain  Right hip pain  Multiple joints that hurt, hands, neck, etc.   No urinary incontinence, no urinary retention, no fevers, no chills, no numbness in or around groin, no weakness.    Past Medical History:   Diagnosis Date    Abnormal Pap smear of cervix     Depression     Depression with anxiety     Depression with anxiety     Iron deficiency anemia 2019    Stroke ([address] 2016    Idiopathic she is still in process     Thyroid disease      Past Surgical History:   Procedure Laterality Date    BLADDER SURGERY      done by Dr. Virgie Ascencio x10 yrs    COLONOSCOPY  2019    KERRY AMADO MD    DILATION AND CURETTAGE      ENDOMETRIAL ABLATION      TONSILLECTOMY       Social History     Socioeconomic History    Marital status:      Spouse name: Not on file    Number of children: Not on file    Years of education: Not on file    Highest education level: Not on file   Occupational History    Not on file   Tobacco Use    Smoking status: Former Smoker     Types: Cigarettes     Start date: 1988     Quit date: 1990     Years since quittin.1    Smokeless tobacco: Never Used   Vaping Use    Vaping Use: Never used   Substance and Sexual Activity    Alcohol use: Yes     Comment: ocassionally    Drug use: No    Sexual activity: Not Currently   Other Topics Concern    Not on file   Social History Narrative    Not on file     Social Determinants of Health     Financial Resource Strain: Low Risk     Difficulty of Paying Living Expenses: Not hard at all Food Insecurity: No Food Insecurity    Worried About Running Out of Food in the Last Year: Never true    Jose Antonio of Food in the Last Year: Never true   Transportation Needs: No Transportation Needs    Lack of Transportation (Medical): No    Lack of Transportation (Non-Medical): No   Physical Activity:     Days of Exercise per Week:     Minutes of Exercise per Session:    Stress:     Feeling of Stress :    Social Connections:     Frequency of Communication with Friends and Family:     Frequency of Social Gatherings with Friends and Family:     Attends Muslim Services:     Active Member of Clubs or Organizations:     Attends Club or Organization Meetings:     Marital Status:    Intimate Partner Violence:     Fear of Current or Ex-Partner:     Emotionally Abused:     Physically Abused:     Sexually Abused: Allergies   Allergen Reactions    Amoxicillin Hives    Nsaids Other (See Comments)     STROKE/NO NSAIDS ALLOWED    Pcn [Penicillins] Rash     Current Outpatient Medications on File Prior to Visit   Medication Sig Dispense Refill    fluticasone (FLONASE) 50 MCG/ACT nasal spray 1 spray by Each Nostril route daily      cetirizine (ZYRTEC) 10 MG tablet Take 10 mg by mouth daily      levothyroxine (SYNTHROID) 100 MCG tablet Take 1 tablet by mouth daily 90 tablet 3    aspirin 81 MG tablet Take 81 mg by mouth 2 times daily       No current facility-administered medications on file prior to visit. I have personally reviewed the ROS, PMH, PFH, and social history     Review of Systems   Constitutional: Negative for chills and fever. HENT: Negative for congestion. Eyes: Negative for pain. Respiratory: Negative for shortness of breath. Cardiovascular: Negative for chest pain. Gastrointestinal: Negative for abdominal pain. Genitourinary: Negative for hematuria. Musculoskeletal: Positive for arthralgias. Negative for back pain.    Allergic/Immunologic: Negative for immunocompromised state. Neurological: Positive for dizziness. Negative for headaches. Psychiatric/Behavioral: Negative for hallucinations. Objective:   /71 (Site: Left Upper Arm, Position: Sitting, Cuff Size: Large Adult)   Pulse 70   Temp 98.3 °F (36.8 °C) (Tympanic)   Resp 15   Ht 5' 5\" (1.651 m)   Wt 149 lb (67.6 kg)   LMP 03/18/2019 (Approximate)   SpO2 96%   BMI 24.79 kg/m²     Physical Exam  Constitutional:       General: She is not in acute distress. Appearance: Normal appearance. She is not ill-appearing, toxic-appearing or diaphoretic. HENT:      Head: Normocephalic. Neck:      Vascular: No carotid bruit. Cardiovascular:      Rate and Rhythm: Normal rate and regular rhythm. Pulses: Normal pulses. Heart sounds: Normal heart sounds. No murmur heard. No friction rub. No gallop. Pulmonary:      Effort: Pulmonary effort is normal. No respiratory distress. Breath sounds: Normal breath sounds. No wheezing, rhonchi or rales. Abdominal:      General: Abdomen is flat. There is no distension. Palpations: Abdomen is soft. Tenderness: There is no abdominal tenderness. There is no right CVA tenderness, left CVA tenderness, guarding or rebound. Musculoskeletal:      Cervical back: Neck supple. Right lower leg: No edema. Left lower leg: No edema. Comments: Right without erythema, ballottement, warmth, or swelling over patella. No pain with palpitation of MCL or LCL  Negative anterior and posterior lachman test  Negative mccmury test   Negative patellar grind test    RIGHT HIP was without erythema, warmth or swelling  Non tender to palpitation. No saddle anesthesia  5/5 muscle strength in bl hip ext/flexors, knee flex/extensors, and plantar and dorsiflexion  Negative Straight leg test  Sensation intact to soft touch and prick   No point tenderness over spinous processes.    2+ patellar and achilles reflexes bl   No palpable step off Negative babinski  No clonus    Skin:     General: Skin is warm. Findings: Rash present. No erythema. Neurological:      Mental Status: She is alert. Cranial Nerves: No cranial nerve deficit. Comments: Negative hallie hallpike    Psychiatric:         Mood and Affect: Mood normal.           Assessment:       Diagnosis Orders   1. Memory loss  Gerald Mcdonnell CNP, Neurology, [place]    MRI BRAIN W WO CONTRAST    C-Reactive Protein    External Referral to Occupational Medicine    TSH with Reflex    Comprehensive Metabolic Panel    Lipid, Fasting    Vitamin D 25 Hydroxy   2. Dizziness  [facility] Bautista Primes CNP, Neurology, [place]    MRI BRAIN W WO CONTRAST    C-Reactive Protein    External Referral to Occupational Medicine    TSH with Reflex    Comprehensive Metabolic Panel    Lipid, Fasting    Vitamin D 25 Hydroxy   3. Arthralgia, unspecified joint  Amb External Referral To Rheumatology    Sedimentation Rate    C-Reactive Protein    External Referral to Occupational Medicine    TSH with Reflex    Comprehensive Metabolic Panel    Lipid, Fasting    Vitamin D 25 Hydroxy    XR KNEE RIGHT (3 VIEWS)    XR HIP RIGHT (2-3 VIEWS)    C.trachomatis N.gonorrhoeae DNA   4. Right hip pain     5. Chronic pain of right knee     6. Thyroid nodule           Plan:     Vc  Orders from 5/17/21 not done   Saw rheum (Get 2nd opinion)   Repeat thyroid US in one year, (Due 05/2021)   Has to reschedule heart monitor due to insurance (not done)   abd Sx are better. FCE   Will do genetic testing prior to starting another medication.  (defer for now)  Refused covid vaccine 2021   F/U with ob/gyn              Orders Placed This Encounter   Procedures    C.trachomatis N.gonorrhoeae DNA     Standing Status:   Future     Standing Expiration Date:   8/26/2022   [facility] MRI BRAIN W WO CONTRAST     Standing Status:   Future     Standing Expiration Date:   8/26/2022    XR KNEE RIGHT (3 VIEWS)     Standing Status:   Future     Standing Expiration Date:   8/26/2022    XR HIP RIGHT (2-3 VIEWS)     Standing Status:   Future     Standing Expiration Date:   8/26/2022    Sedimentation Rate     Standing Status:   Future     Standing Expiration Date:   8/26/2022    C-Reactive Protein     Standing Status:   Future     Standing Expiration Date:   8/26/2022    TSH with Reflex     Standing Status:   Future     Standing Expiration Date:   8/26/2022    Comprehensive Metabolic Panel     Standing Status:   Future     Standing Expiration Date:   8/26/2022    Lipid, Fasting     Standing Status:   Future     Standing Expiration Date:   8/26/2022    Vitamin D 25 Hydroxy     Standing Status:   Future     Standing Expiration Date:   8/26/2022    Amb External Referral To Rheumatology     Referral Priority:   Routine     Referral Type:   Eval and Treat     Referral Reason:   Specialty Services Required     Referred to Provider:   Silvia Daniels MD     Requested Specialty:   Rheumatology     Number of Visits Requested:   [facility], [facility]     Referral Priority:   Routine     Referral Type:   Eval and Treat     Referral Reason:   Specialty Services Required     Referred to Provider:   TANIYA Shah CNP     Requested Specialty:   Nurse Practitioner     Number of Visits Requested:   1    External Referral to Occupational Medicine     Referral Priority:   Routine     Referral Type:   Eval and Treat     Referral Reason:   Specialty Services Required     Requested Specialty:   Occupational Medicine     Number of Visits Requested:   1     Orders Placed This Encounter   Medications    ciclopirox (PENLAC) 8 % solution     Sig: Apply to adjacent skin and affected nails daily (as a part of a comprehensive management program for onychomycosis). Remove with alcohol every 7 days.      Dispense:  1 Bottle     Refill:  0    mometasone (ELOCON) 0.1 % cream     Sig: Apply thin layer topically once daily to rash on lower legs Dispense:  45 g     Refill:  0   NO SI/HI. STOP Cream after 7 days   Red Flags explained  For back ETC  Call immediatlye if they occur  40 minutes spent. No urinary incontinence, no urinary retention, no fevers, no chills, no numbness in or around groin, no weakness. If anything should change or worsen call ASAP, don't wait for next scheduled appointment. No follow-ups on file.       Vance Mead MD

## 2021-08-31 ENCOUNTER — TELEPHONE (OUTPATIENT)
Dept: FAMILY MEDICINE CLINIC | Age: 51
End: 2021-08-31

## 2021-08-31 VITALS
WEIGHT: 141 LBS | DIASTOLIC BLOOD PRESSURE: 71 MMHG | HEART RATE: 70 BPM | TEMPERATURE: 98.3 F | SYSTOLIC BLOOD PRESSURE: 133 MMHG | BODY MASS INDEX: 23.49 KG/M2 | HEIGHT: 65 IN | RESPIRATION RATE: 15 BRPM | OXYGEN SATURATION: 96 %

## 2021-08-31 DIAGNOSIS — E04.1 THYROID NODULE: Primary | ICD-10-CM

## 2021-08-31 NOTE — TELEPHONE ENCOUNTER
Please advise regarding MRI and Occ Medicine questions, patient is flustered and also wants to know what caused albumin 4.9.

## 2021-08-31 NOTE — TELEPHONE ENCOUNTER
Patient had several complaints  Albumin high probably not enough water  Do mri then see nanette falk  [facility] at any time   Weight adjusted

## 2021-08-31 NOTE — TELEPHONE ENCOUNTER
Patient called because she was a little overwhelmed with all of the things she has to do after her last appointment. Her albumin was elevated at 4.9. What could cause that? The [place] has . A new order needs to be placed. She wants to know if she should wait to schedule with Ag Hooker and [facility] Medicine until she has her MRI. Patient also wants her weight corrected in her chart. She said it is 141 lbs.     Please advise, thank you

## 2021-09-01 NOTE — TELEPHONE ENCOUNTER
Patient called and was given the answers to her questions. She wants to know if you think she should see the Rheumatologist before she starts physical therapy. Please advise, thank you.

## 2021-09-09 PROBLEM — J02.9 SORE THROAT: Status: RESOLVED | Noted: 2021-08-10 | Resolved: 2021-09-09

## 2021-09-17 ENCOUNTER — OFFICE VISIT (OUTPATIENT)
Dept: FAMILY MEDICINE CLINIC | Age: 51
End: 2021-09-17
Payer: COMMERCIAL

## 2021-09-17 VITALS
DIASTOLIC BLOOD PRESSURE: 70 MMHG | WEIGHT: 141 LBS | TEMPERATURE: 96.8 F | OXYGEN SATURATION: 99 % | BODY MASS INDEX: 23.49 KG/M2 | HEART RATE: 64 BPM | SYSTOLIC BLOOD PRESSURE: 120 MMHG | HEIGHT: 65 IN

## 2021-09-17 DIAGNOSIS — H65.93 MIDDLE EAR EFFUSION, BILATERAL: ICD-10-CM

## 2021-09-17 DIAGNOSIS — R68.89 FLU-LIKE SYMPTOMS: ICD-10-CM

## 2021-09-17 DIAGNOSIS — J06.9 VIRAL URI: Primary | ICD-10-CM

## 2021-09-17 LAB
INFLUENZA A ANTIBODY: NORMAL
INFLUENZA B ANTIBODY: NORMAL
Lab: NORMAL
PERFORMING INSTRUMENT: NORMAL
QC PASS/FAIL: NORMAL
SARS-COV-2, POC: NORMAL

## 2021-09-17 PROCEDURE — 87804 INFLUENZA ASSAY W/OPTIC: CPT | Performed by: NURSE PRACTITIONER

## 2021-09-17 PROCEDURE — 87426 SARSCOV CORONAVIRUS AG IA: CPT | Performed by: NURSE PRACTITIONER

## 2021-09-17 PROCEDURE — 99213 OFFICE O/P EST LOW 20 MIN: CPT | Performed by: NURSE PRACTITIONER

## 2021-09-17 ASSESSMENT — ENCOUNTER SYMPTOMS
EYE PAIN: 0
WHEEZING: 0
COUGH: 1
EYE ITCHING: 0
SHORTNESS OF BREATH: 0
EYE REDNESS: 0
ABDOMINAL PAIN: 0
SORE THROAT: 0
DIARRHEA: 0
COLOR CHANGE: 0
EYE DISCHARGE: 0
SINUS PRESSURE: 1
NAUSEA: 1
VOMITING: 0

## 2021-09-17 NOTE — PATIENT INSTRUCTIONS
Fluids, rest, tylenol   Restart flonase and zyrtec   Patient Education        Upper Respiratory Infection (Cold): Care Instructions  Your Care Instructions     An upper respiratory infection, or URI, is an infection of the nose, sinuses, or throat. URIs are spread by coughs, sneezes, and direct contact. The common cold is the most frequent kind of URI. The flu and sinus infections are other kinds of URIs. Almost all URIs are caused by viruses. Antibiotics won't cure them. But you can treat most infections with home care. This may include drinking lots of fluids and taking over-the-counter pain medicine. You will probably feel better in 4 to 10 days. The doctor has checked you carefully, but problems can develop later. If you notice any problems or new symptoms, get medical treatment right away. Follow-up care is a key part of your treatment and safety. Be sure to make and go to all appointments, and call your doctor if you are having problems. It's also a good idea to know your test results and keep a list of the medicines you take. How can you care for yourself at home? · To prevent dehydration, drink plenty of fluids. Choose water and other caffeine-free clear liquids until you feel better. If you have kidney, heart, or liver disease and have to limit fluids, talk with your doctor before you increase the amount of fluids you drink. · Take an over-the-counter pain medicine, such as acetaminophen (Tylenol), ibuprofen (Advil, Motrin), or naproxen (Aleve). Read and follow all instructions on the label. · Before you use cough and cold medicines, check the label. These medicines may not be safe for young children or for people with certain health problems. · Be careful when taking over-the-counter cold or flu medicines and Tylenol at the same time. Many of these medicines have acetaminophen, which is Tylenol. Read the labels to make sure that you are not taking more than the recommended dose.  Too much acetaminophen (Tylenol) can be harmful. · Get plenty of rest.  · Do not smoke or allow others to smoke around you. If you need help quitting, talk to your doctor about stop-smoking programs and medicines. These can increase your chances of quitting for good. When should you call for help? Call 911 anytime you think you may need emergency care. For example, call if:    · You have severe trouble breathing. Call your doctor now or seek immediate medical care if:    · You seem to be getting much sicker.     · You have new or worse trouble breathing.     · You have a new or higher fever.     · You have a new rash. Watch closely for changes in your health, and be sure to contact your doctor if:    · You have a new symptom, such as a sore throat, an earache, or sinus pain.     · You cough more deeply or more often, especially if you notice more mucus or a change in the color of your mucus.     · You do not get better as expected. Where can you learn more? Go to [email or web address].Oony. org and sign in to your [employer] account. Enter N367 in the Astley Clarke box to learn more about \"Upper Respiratory Infection (Cold): Care Instructions. \"     If you do not have an account, please click on the \"Sign Up Now\" link. Current as of: October 26, 2020               Content Version: 12.9  © 4048-4283 [employer], Incorporated. Care instructions adapted under license by [facility] ([facility]). If you have questions about a medical condition or this instruction, always ask your healthcare professional. [place] [address] any warranty or liability for your use of this information.

## 2021-09-17 NOTE — LETTER
[address] [place] [address]  Phone: [phone]  Fax: [phone]    Frankey Arch, APRN - AMAN        September 17, 2021     Patient: Chris Amor   YOB: 1970   Date of Visit: 9/17/2021       To Whom it May Concern:    Renetta Henry was seen in my clinic on 9/17/2021. She may return to work on 9/20/2021 if symptoms have improved. If you have any questions or concerns, please don't hesitate to call.     Sincerely,         Frankey Arch, APRN - CNP

## 2021-09-17 NOTE — PROGRESS NOTES
Subjective  [address], 46 y.o. female presents today with:  Chief Complaint   Patient presents with    Cough     Dry. x3 days Pt has been taking tylenol. Positive covid exposure.      Fatigue    Headache    Taste Change    Generalized Body Aches    Nausea       HPI  Patient with covid symptoms     Review of Systems    Past Medical History:   Diagnosis Date    Abnormal Pap smear of cervix     Depression     Depression with anxiety     Depression with anxiety     Iron deficiency anemia 2019    Stroke ([address] 2016    Idiopathic she is still in process     Thyroid disease      Past Surgical History:   Procedure Laterality Date    BLADDER SURGERY      done by Dr. IDANIA [address]ci[address] 23 x10 yrs    COLONOSCOPY  2019    KERRY AMADO MD    DILATION AND CURETTAGE      ENDOMETRIAL ABLATION      TONSILLECTOMY       Social History     Socioeconomic History    Marital status:      Spouse name: Not on file    Number of children: Not on file    Years of education: Not on file    Highest education level: Not on file   Occupational History    Not on file   Tobacco Use    Smoking status: Former Smoker     Packs/day: 0.25     Years: 3.00     Pack years: 0.75     Types: Cigarettes     Start date: 1988     Quit date: 1990     Years since quittin.2    Smokeless tobacco: Never Used   Vaping Use    Vaping Use: Never used   Substance and Sexual Activity    Alcohol use: Yes     Comment: ocassionally    Drug use: No    Sexual activity: Not Currently   Other Topics Concern    Not on file   Social History Narrative    Not on file     Social Determinants of Health     Financial Resource Strain: Low Risk     Difficulty of Paying Living Expenses: Not hard at all   Food Insecurity: No Food Insecurity    Worried About [address] [facility] in the Last Year: Never true    [employer] in the Last Year: Never true   Transportation Needs: No Transportation Needs    Lack of Transportation (Medical): No    Lack of Transportation (Non-Medical): No   Physical Activity:     Days of Exercise per Week:     Minutes of Exercise per Session:    Stress:     Feeling of Stress :    Social Connections:     Frequency of Communication with Friends and Family:     Frequency of Social Gatherings with Friends and Family:     Attends Sikhism Services:     Active Member of Clubs or Organizations:     Attends Club or Organization Meetings:     Marital Status:    Intimate Partner Violence:     Fear of Current or Ex-Partner:     Emotionally Abused:     Physically Abused:     Sexually Abused:      No family history on file. Allergies   Allergen Reactions    Amoxicillin Hives    Nsaids Other (See Comments)     STROKE/NO NSAIDS ALLOWED    Pcn [Penicillins] Rash     Current Outpatient Medications   Medication Sig Dispense Refill    fluticasone (FLONASE) 50 MCG/ACT nasal spray 1 spray by Each Nostril route daily      cetirizine (ZYRTEC) 10 MG tablet Take 10 mg by mouth daily      ciclopirox (PENLAC) 8 % solution Apply to adjacent skin and affected nails daily (as a part of a comprehensive management program for onychomycosis). Remove with alcohol every 7 days. 1 Bottle 0    levothyroxine (SYNTHROID) 100 MCG tablet Take 1 tablet by mouth daily 90 tablet 3    aspirin 81 MG tablet Take 81 mg by mouth 2 times daily       No current facility-administered medications for this visit. PMH, Surgical Hx, Family Hx, and Social Hx reviewed and updated. Health Maintenance reviewed. Objective  Vitals:    09/17/21 0948   BP: 120/70   Pulse: 64   Temp: 96.8 °F (36 °C)   SpO2: 99%   Weight: 141 lb (64 kg)   Height: 5' 5\" (1.651 m)     BP Readings from Last 3 Encounters:   09/17/21 120/70   08/26/21 133/71   08/10/21 122/82     Wt Readings from Last 3 Encounters:   09/17/21 141 lb (64 kg)   08/26/21 141 lb (64 kg)   08/10/21 140 lb (63.5 kg)     Physical Exam  Assessment & Plan    Diagnosis Orders   1.  Flu-like symptoms  Covid-19 Ambulatory    POCT Influenza A/B     Orders Placed This Encounter   Procedures    Covid-19 Ambulatory     Standing Status:   Future     Standing Expiration Date:   9/17/2022     Scheduling Instructions:      Saline media preferred given current shortage of viral transport media but both acceptable     Order Specific Question:   Is this test for diagnosis or screening? Answer:   Diagnosis of ill patient     Order Specific Question:   Symptomatic for COVID-19 as defined by CDC? Answer:   Yes     Order Specific Question:   Date of Symptom Onset     Answer:   9/14/2021     Order Specific Question:   Hospitalized for COVID-19? Answer:   No     Order Specific Question:   Admitted to ICU for COVID-19? Answer:   No     Order Specific Question:   Employed in healthcare setting? Answer:   Unknown     Order Specific Question:   Resident in a congregate (group) care setting? Answer:   Unknown     Order Specific Question:   Pregnant? Answer:   No     Order Specific Question:   Previously tested for COVID-19? Answer: Yes    POCT Influenza A/B     No orders of the defined types were placed in this encounter. Medications Discontinued During This Encounter   Medication Reason    mometasone (ELOCON) 0.1 % cream Patient Choice     No follow-ups on file. Reviewed with the patient: current clinical status,medications, activities and diet. Side effects, adverse effects of the medication prescribed today, as well as treatment plan/ rationale and result expectations have been discussed with the patient who expresses understanding and desires to proceed. Close follow up to evaluate treatment results and for coordination of care. I have reviewed the patient's medical history in detail and updated the computerized patient record.     Silvia Duque, APRN - CNP

## 2021-09-17 NOTE — PROGRESS NOTES
Subjective  [address] [facility] [facility], 46 y.o. female presents today with:  Chief Complaint   Patient presents with    Cough     Dry. x3 days Pt has been taking tylenol. Positive covid exposure.  Fatigue    Headache    Taste Change    Generalized Body Aches    Nausea       HPI  Patient exposed to covid by coworker-they both were wearing masks  Symptoms started Tuesday  It was about 2 wks ago first exposure, 2nd exposure was 1.5 wks so   she has a change in taste- has the burning nose like when she had covid she has fatigue and dry cough     She had covid in January - not hospitalized      Tylenol takes the edge off    Seen in August- sinus infection  Feels like the symptoms linger  Review of Systems   Constitutional: Positive for appetite change, chills and fatigue. Negative for fever. HENT: Positive for postnasal drip, sinus pressure and tinnitus (left ear but resolved). Negative for congestion and sore throat. Eyes: Negative for pain, discharge, redness and itching. Seem tired    Respiratory: Positive for cough. Negative for shortness of breath and wheezing. Cardiovascular: Negative for chest pain and palpitations. Gastrointestinal: Positive for nausea. Negative for abdominal pain, diarrhea and vomiting. Musculoskeletal: Positive for myalgias (severe). Skin: Negative for color change. Neurological: Positive for headaches.        Past Medical History:   Diagnosis Date    Abnormal Pap smear of cervix     Depression     Depression with anxiety     Depression with anxiety     Iron deficiency anemia 4/12/2019    Stroke ([address] 05/2016    Idiopathic she is still in process     Thyroid disease      Past Surgical History:   Procedure Laterality Date    BLADDER SURGERY      done by [facility] x10 yrs    COLONOSCOPY  03/04/2019    KERRY AMADO MD    DILATION AND CURETTAGE      ENDOMETRIAL ABLATION      TONSILLECTOMY       Social History     Socioeconomic History    Marital status:  Spouse name: Not on file    Number of children: Not on file    Years of education: Not on file    Highest education level: Not on file   Occupational History    Not on file   Tobacco Use    Smoking status: Former Smoker     Packs/day: 0.25     Years: 3.00     Pack years: 0.75     Types: Cigarettes     Start date: 1988     Quit date: 1990     Years since quittin.2    Smokeless tobacco: Never Used   Vaping Use    Vaping Use: Never used   Substance and Sexual Activity    Alcohol use: Yes     Comment: ocassionally    Drug use: No    Sexual activity: Not Currently   Other Topics Concern    Not on file   Social History Narrative    Not on file     Social Determinants of Health     Financial Resource Strain: Low Risk     Difficulty of Paying Living Expenses: Not hard at all   Food Insecurity: No Food Insecurity    Worried About [address] [employer] in the Last Year: Never true    [facility] [employer] in the Last Year: Never true   Transportation Needs: No Transportation Needs    Lack of Transportation (Medical): No    Lack of Transportation (Non-Medical): No   Physical Activity:     Days of Exercise per Week:     Minutes of Exercise per Session:    Stress:     Feeling of Stress :    Social Connections:     Frequency of Communication with Friends and Family:     Frequency of Social Gatherings with Friends and Family:     Attends Sikhism Services:     Active Member of Clubs or Organizations:     Attends Club or Organization Meetings:     Marital Status:    Intimate Partner Violence:     Fear of Current or Ex-Partner:     Emotionally Abused:     Physically Abused:     Sexually Abused:      No family history on file.   Allergies   Allergen Reactions    Amoxicillin Hives    Nsaids Other (See Comments)     STROKE/NO NSAIDS ALLOWED    Pcn [Penicillins] Rash     Current Outpatient Medications   Medication Sig Dispense Refill    fluticasone (FLONASE) 50 MCG/ACT nasal spray 1 spray by Each Nostril route daily      cetirizine (ZYRTEC) 10 MG tablet Take 10 mg by mouth daily      ciclopirox (PENLAC) 8 % solution Apply to adjacent skin and affected nails daily (as a part of a comprehensive management program for onychomycosis). Remove with alcohol every 7 days. 1 Bottle 0    levothyroxine (SYNTHROID) 100 MCG tablet Take 1 tablet by mouth daily 90 tablet 3    aspirin 81 MG tablet Take 81 mg by mouth 2 times daily       No current facility-administered medications for this visit. PMH, Surgical Hx, Family Hx, and Social Hx reviewed and updated. Health Maintenance reviewed. Objective  Vitals:    09/17/21 0948   BP: 120/70   Pulse: 64   Temp: 96.8 °F (36 °C)   SpO2: 99%   Weight: 141 lb (64 kg)   Height: 5' 5\" (1.651 m)     BP Readings from Last 3 Encounters:   09/17/21 120/70   08/26/21 133/71   08/10/21 122/82     Wt Readings from Last 3 Encounters:   09/17/21 141 lb (64 kg)   08/26/21 141 lb (64 kg)   08/10/21 140 lb (63.5 kg)     Physical Exam  Constitutional:       Appearance: Normal appearance. HENT:      Head: Normocephalic. Right Ear: A middle ear effusion is present. Left Ear: A middle ear effusion is present. Mouth/Throat:      Pharynx: Uvula midline. Oropharyngeal exudate (PND+) and posterior oropharyngeal erythema present. Eyes:      Extraocular Movements: Extraocular movements intact. Conjunctiva/sclera: Conjunctivae normal.      Pupils: Pupils are equal, round, and reactive to light. Cardiovascular:      Rate and Rhythm: Normal rate and regular rhythm. Pulses: Normal pulses. Heart sounds: Normal heart sounds. Pulmonary:      Effort: Pulmonary effort is normal. No respiratory distress. Breath sounds: Normal breath sounds. No wheezing, rhonchi or rales. Genitourinary:     Comments: deferred  Musculoskeletal:         General: Normal range of motion. Cervical back: Normal range of motion.    Lymphadenopathy:      Cervical: Cervical adenopathy present. Skin:     General: Skin is warm and dry. Neurological:      General: No focal deficit present. Mental Status: She is alert and oriented to person, place, and time. Gait: Gait normal.   Psychiatric:         Mood and Affect: Mood normal.         Behavior: Behavior normal.       Assessment & Plan    Diagnosis Orders   1. Viral URI     2. Flu-like symptoms  Covid-19 Ambulatory    POCT Influenza A/B    POCT COVID-19, Antigen   3. Middle ear effusion, bilateral     covid rapid negative, patient requesting send out, advised viral load may not be high enough, patient would like it sent out  Discussed URI does not require antibiotics if symptoms persist return or see PCP  Middle ear effusion- zyrtec/flonase    Orders Placed This Encounter   Procedures    Covid-19 Ambulatory     Standing Status:   Future     Standing Expiration Date:   9/17/2022     Scheduling Instructions:      Saline media preferred given current shortage of viral transport media but both acceptable     Order Specific Question:   Is this test for diagnosis or screening? Answer:   Diagnosis of ill patient     Order Specific Question:   Symptomatic for COVID-19 as defined by CDC? Answer:   Yes     Order Specific Question:   Date of Symptom Onset     Answer:   9/14/2021     Order Specific Question:   Hospitalized for COVID-19? Answer:   No     Order Specific Question:   Admitted to ICU for COVID-19? Answer:   No     Order Specific Question:   Employed in healthcare setting? Answer:   Unknown     Order Specific Question:   Resident in a congregate (group) care setting? Answer:   Unknown     Order Specific Question:   Pregnant? Answer:   No     Order Specific Question:   Previously tested for COVID-19? Answer: Yes    POCT Influenza A/B    POCT COVID-19, Antigen     Order Specific Question:   Is this test for diagnosis or screening?      Answer:   Diagnosis of ill patient     Order Specific Question:   Symptomatic for COVID-19 as defined by CDC? Answer:   Yes     Order Specific Question:   Date of Symptom Onset     Answer:   9/15/2021     Order Specific Question:   Hospitalized for COVID-19? Answer:   No     Order Specific Question:   Admitted to ICU for COVID-19? Answer:   No     Order Specific Question:   Employed in healthcare setting? Answer:   Yes     Order Specific Question:   Resident in a congregate (group) care setting? Answer:   No     Order Specific Question:   Pregnant? Answer:   No     Order Specific Question:   Previously tested for COVID-19? Answer:   Yes     No orders of the defined types were placed in this encounter. Medications Discontinued During This Encounter   Medication Reason    mometasone (ELOCON) 0.1 % cream Patient Choice     Return in about 1 week (around 9/24/2021) for follow up with PCP. Reviewed with the patient: current clinical status,medications, activities and diet. Side effects, adverse effects of the medication prescribed today, as well as treatment plan/ rationale and result expectations have been discussed with the patient who expresses understanding and desires to proceed. Close follow up to evaluate treatment results and for coordination of care. I have reviewed the patient's medical history in detail and updated the computerized patient record.     TANIYA Galeano - CNP

## 2021-11-09 ENCOUNTER — OFFICE VISIT (OUTPATIENT)
Dept: FAMILY MEDICINE CLINIC | Age: 51
End: 2021-11-09
Payer: COMMERCIAL

## 2021-11-09 VITALS
OXYGEN SATURATION: 98 % | HEART RATE: 72 BPM | BODY MASS INDEX: 22.5 KG/M2 | SYSTOLIC BLOOD PRESSURE: 132 MMHG | DIASTOLIC BLOOD PRESSURE: 86 MMHG | WEIGHT: 140 LBS | TEMPERATURE: 97.8 F | HEIGHT: 66 IN

## 2021-11-09 DIAGNOSIS — R30.0 DYSURIA: ICD-10-CM

## 2021-11-09 DIAGNOSIS — R31.21 ASYMPTOMATIC MICROSCOPIC HEMATURIA: Primary | ICD-10-CM

## 2021-11-09 DIAGNOSIS — R31.21 ASYMPTOMATIC MICROSCOPIC HEMATURIA: ICD-10-CM

## 2021-11-09 DIAGNOSIS — H65.93 FLUID LEVEL BEHIND TYMPANIC MEMBRANE OF BOTH EARS: ICD-10-CM

## 2021-11-09 LAB
BACTERIA: NEGATIVE /HPF
BILIRUBIN URINE: NEGATIVE
BILIRUBIN, POC: NORMAL
BLOOD URINE, POC: NORMAL
BLOOD, URINE: ABNORMAL
CLARITY, POC: CLEAR
CLARITY: CLEAR
COLOR, POC: CLEAR
COLOR: YELLOW
EPITHELIAL CELLS, UA: NORMAL /HPF (ref 0–5)
GLUCOSE URINE, POC: NORMAL
GLUCOSE URINE: NEGATIVE MG/DL
HYALINE CASTS: NORMAL /HPF (ref 0–5)
KETONES, POC: NORMAL
KETONES, URINE: NEGATIVE MG/DL
LEUKOCYTE EST, POC: NORMAL
LEUKOCYTE ESTERASE, URINE: NEGATIVE
NITRITE, POC: NORMAL
NITRITE, URINE: NEGATIVE
PH UA: 6.5 (ref 5–9)
PH, POC: 6
PROTEIN UA: NEGATIVE MG/DL
PROTEIN, POC: NORMAL
RBC UA: NORMAL /HPF (ref 0–5)
SPECIFIC GRAVITY UA: 1 (ref 1–1.03)
SPECIFIC GRAVITY, POC: 1.01
URINE REFLEX TO CULTURE: ABNORMAL
UROBILINOGEN, POC: 3.5
UROBILINOGEN, URINE: 0.2 E.U./DL
WBC UA: NORMAL /HPF (ref 0–5)

## 2021-11-09 PROCEDURE — 99213 OFFICE O/P EST LOW 20 MIN: CPT | Performed by: PHYSICIAN ASSISTANT

## 2021-11-09 PROCEDURE — 81002 URINALYSIS NONAUTO W/O SCOPE: CPT | Performed by: PHYSICIAN ASSISTANT

## 2021-11-09 ASSESSMENT — ENCOUNTER SYMPTOMS
ABDOMINAL PAIN: 0
SINUS PAIN: 0
SHORTNESS OF BREATH: 0
CONSTIPATION: 0
COUGH: 0
BACK PAIN: 0
SORE THROAT: 0
WHEEZING: 0
NAUSEA: 0
EYE PAIN: 0
ALLERGIC/IMMUNOLOGIC NEGATIVE: 1
VOMITING: 0
CHEST TIGHTNESS: 0
SINUS PRESSURE: 0
DIARRHEA: 0
EYE DISCHARGE: 0

## 2021-11-09 NOTE — PROGRESS NOTES
[address], 46 y.o. female presents today with:  Chief Complaint   Patient presents with    Urinary Frequency     She complains of having frequency, urgency, burning with urination. She was treated with antibiotics the of 10/2021 which she finished last week.  Otalgia     She complains of having right ear pain. HPI   pt Is seen acutely for complaint of urinary frequency urgency and dysuria - states she was treated at urgent care [facility] last month for UTI but unsure that symptoms ever resolved  States she has \"always\" had blood in her urine-no follow-up with OB/GYN or urology  Status post bladder suspension  States she had a Pap last year but does not recall the name of the provider   Recently  states she never had urinary symptoms until now asking for any prophylactic therapy she may use  Also with complaint of fullness in her right ear worse yesterday improved today using Flonase daily and Zyrtec but does not feel Zyrtec is very helpful Benadryl works better    Review of Systems   Constitutional: Positive for chills and fatigue. Negative for fever. HENT: Positive for ear pain. Negative for congestion, ear discharge, sinus pressure, sinus pain and sore throat. Eyes: Negative for pain and discharge. Respiratory: Negative for cough, chest tightness, shortness of breath and wheezing. Cardiovascular: Negative for chest pain and leg swelling. Gastrointestinal: Negative for abdominal pain, constipation, diarrhea, nausea and vomiting. Endocrine: Negative. Genitourinary: Positive for difficulty urinating, dysuria, frequency and urgency. Negative for decreased urine volume, flank pain, hematuria and vaginal discharge. Musculoskeletal: Negative for back pain and neck pain. Skin: Negative for rash. Allergic/Immunologic: Negative. Neurological: Negative for dizziness and headaches. Hematological: Negative. Psychiatric/Behavioral: Negative.     All other systems reviewed and are negative. Past Medical History:   Diagnosis Date    Abnormal Pap smear of cervix     Depression     Depression with anxiety     Depression with anxiety     Iron deficiency anemia 2019    Stroke ([address] 2016    Idiopathic she is still in process     Thyroid disease      Past Surgical History:   Procedure Laterality Date    BLADDER SURGERY      done by Dr. Jorge Brandt x10 yrs    COLONOSCOPY  2019    KERRY AMADO MD    DILATION AND CURETTAGE      ENDOMETRIAL ABLATION      TONSILLECTOMY       Social History     Socioeconomic History    Marital status:      Spouse name: Not on file    Number of children: Not on file    Years of education: Not on file    Highest education level: Not on file   Occupational History    Not on file   Tobacco Use    Smoking status: Former Smoker     Packs/day: 0.25     Years: 3.00     Pack years: 0.75     Types: Cigarettes     Start date: 1988     Quit date: 1990     Years since quittin.3    Smokeless tobacco: Never Used   Vaping Use    Vaping Use: Never used   Substance and Sexual Activity    Alcohol use: Yes     Comment: ocassionally    Drug use: No    Sexual activity: Not Currently   Other Topics Concern    Not on file   Social History Narrative    Not on file     Social Determinants of Health     Financial Resource Strain: Low Risk     Difficulty of Paying Living Expenses: Not hard at all   Food Insecurity: No Food Insecurity    Worried About [address] in the Last Year: Never true    [address]  in the Last Year: Never true   Transportation Needs: No Transportation Needs    Lack of Transportation (Medical): No    Lack of Transportation (Non-Medical):  No   Physical Activity:     Days of Exercise per Week: Not on file    Minutes of Exercise per Session: Not on file   Stress:     Feeling of Stress : Not on file   Social Connections:     Frequency of Communication with Friends and Family: Not on file    Frequency of Social Gatherings with Friends and Family: Not on file    Attends Hindu Services: Not on file    Active Member of Clubs or Organizations: Not on file    Attends Club or Organization Meetings: Not on file    Marital Status: Not on file   Intimate Partner Violence:     Fear of Current or Ex-Partner: Not on file    Emotionally Abused: Not on file    Physically Abused: Not on file    Sexually Abused: Not on file   Housing Stability:     Unable to Pay for Housing in the Last Year: Not on file    Number of [place] in the Last Year: Not on file    Unstable Housing in the Last Year: Not on file     History reviewed. No pertinent family history. Allergies   Allergen Reactions    Amoxicillin Hives    Nsaids Other (See Comments)     STROKE/NO NSAIDS ALLOWED    Pcn [Penicillins] Rash     Current Outpatient Medications   Medication Sig Dispense Refill    fluticasone (FLONASE) 50 MCG/ACT nasal spray 1 spray by Each Nostril route daily      cetirizine (ZYRTEC) 10 MG tablet Take 10 mg by mouth daily      levothyroxine (SYNTHROID) 100 MCG tablet Take 1 tablet by mouth daily 90 tablet 3    aspirin 81 MG tablet Take 81 mg by mouth Takes 2 tablets daily       No current facility-administered medications for this visit. Objective    Vitals:    11/09/21 1300   BP: 132/86   Site: Left Upper Arm   Position: Sitting   Cuff Size: Small Adult   Pulse: 72   Temp: 97.8 °F (36.6 °C)   SpO2: 98%   Weight: 140 lb (63.5 kg)   Height: 5' 6\" (1.676 m)     Physical Exam  Constitutional:       Appearance: Normal appearance. HENT:      Head: Normocephalic and atraumatic. Ears:      Comments: Middle ear effusion bilat     Nose:      Comments: Nasal septal deviation  Eyes:      Extraocular Movements: Extraocular movements intact. Conjunctiva/sclera: Conjunctivae normal.      Pupils: Pupils are equal, round, and reactive to light.    Pulmonary:      Effort: Pulmonary effort is normal. Musculoskeletal:         General: Normal range of motion. Cervical back: Normal range of motion and neck supple. Skin:     General: Skin is warm and dry. Coloration: Skin is not jaundiced or pale. Neurological:      Mental Status: She is alert and oriented to person, place, and time. Coordination: Coordination normal.      Gait: Gait normal.   Psychiatric:         Mood and Affect: Affect is flat. Behavior: Behavior is cooperative. Thought Content: Thought content normal.         Judgment: Judgment normal.              Assessment & Plan    Diagnosis Orders   1. Asymptomatic microscopic hematuria  Urine Reflex to Culture    rec urology and ob-gyn consults  daily probiotic and cranberry supplement 250mg   2. Dysuria  POCT Urinalysis no Micro    Urine Reflex to Culture   3. Fluid level behind tympanic membrane of both ears           Orders Placed This Encounter   Procedures    Urine Reflex to Culture     Standing Status:   Future     Standing Expiration Date:   11/9/2022     Order Specific Question:   SPECIFY(EX-CATH,MIDSTREAM,CYSTO,ETC)? Answer:   mid stream    POCT Urinalysis no Micro     No orders of the defined types were placed in this encounter. Medications Discontinued During This Encounter   Medication Reason    ciclopirox (PENLAC) 8 % solution Patient Choice     Return for follow up with your PCP as directed, call for results.     Annette Longoria PA-C

## 2021-12-07 ENCOUNTER — HOSPITAL ENCOUNTER (OUTPATIENT)
Dept: WOMENS IMAGING | Age: 51
Discharge: HOME | End: 2021-12-09
Payer: COMMERCIAL

## 2021-12-07 VITALS — HEIGHT: 66 IN | BODY MASS INDEX: 22.6 KG/M2

## 2021-12-07 DIAGNOSIS — Z12.31 ENCOUNTER FOR SCREENING MAMMOGRAM FOR MALIGNANT NEOPLASM OF BREAST: ICD-10-CM

## 2021-12-07 PROCEDURE — 77063 BREAST TOMOSYNTHESIS BI: CPT

## 2021-12-23 ENCOUNTER — HOSPITAL ENCOUNTER (OUTPATIENT)
Dept: MRI IMAGING | Age: 51
Discharge: HOME | End: 2021-12-25
Payer: COMMERCIAL

## 2021-12-23 ENCOUNTER — HOSPITAL ENCOUNTER (OUTPATIENT)
Dept: ULTRASOUND IMAGING | Age: 51
Discharge: HOME | End: 2021-12-25
Payer: COMMERCIAL

## 2021-12-23 ENCOUNTER — TELEPHONE (OUTPATIENT)
Dept: FAMILY MEDICINE CLINIC | Age: 51
End: 2021-12-23

## 2021-12-23 DIAGNOSIS — R42 DIZZINESS: ICD-10-CM

## 2021-12-23 DIAGNOSIS — R41.3 MEMORY LOSS: ICD-10-CM

## 2021-12-23 DIAGNOSIS — E04.1 THYROID NODULE: ICD-10-CM

## 2021-12-23 PROCEDURE — 76536 US EXAM OF HEAD AND NECK: CPT

## 2021-12-23 NOTE — TELEPHONE ENCOUNTER
Please advise       Pt tried to get the CT ordered for her per Dr. Macy Jackson. She was unable to have it done as she was very anxious. She is   asking to have something ordered for her to calm her so she can have her   CT.

## 2021-12-24 NOTE — TELEPHONE ENCOUNTER
Pend ativan 0.5 mg 30 minutes prior to mri  Number 1  I will send next week  She cannot drive to or from the appt for the mri  Thanks,    Call immediately should something change.      Juaquin Turcios

## 2021-12-27 ENCOUNTER — PATIENT MESSAGE (OUTPATIENT)
Dept: FAMILY MEDICINE CLINIC | Age: 51
End: 2021-12-27

## 2021-12-27 DIAGNOSIS — F41.9 ANXIETY: Primary | ICD-10-CM

## 2021-12-28 NOTE — TELEPHONE ENCOUNTER
Future Appointments    Encounter Information    Provider Department Appt Notes   1/4/2022 MD EUNICE Sam AT [facility] Appt Reason: Routine Physical Exam   Screened green/ Physical   Booking Code: [id number]       Recent Visits    11/09/2021 Asymptomatic microscopic hematuria   [facility] AT [facility] [place], [place]   09/17/2021 Viral URI   [address] - CNP

## 2021-12-29 RX ORDER — LORAZEPAM 0.5 MG/1
TABLET ORAL
Qty: 1 TABLET | Refills: 0 | Status: SHIPPED | OUTPATIENT
Start: 2021-12-29 | End: 2021-12-30

## 2022-01-04 ENCOUNTER — OFFICE VISIT (OUTPATIENT)
Dept: FAMILY MEDICINE CLINIC | Age: 52
End: 2022-01-04
Payer: COMMERCIAL

## 2022-01-04 VITALS
OXYGEN SATURATION: 99 % | HEIGHT: 66 IN | SYSTOLIC BLOOD PRESSURE: 118 MMHG | DIASTOLIC BLOOD PRESSURE: 64 MMHG | BODY MASS INDEX: 23.14 KG/M2 | HEART RATE: 71 BPM | WEIGHT: 144 LBS | TEMPERATURE: 97.7 F

## 2022-01-04 DIAGNOSIS — R42 DIZZINESS: ICD-10-CM

## 2022-01-04 DIAGNOSIS — N39.0 RECURRENT UTI: ICD-10-CM

## 2022-01-04 DIAGNOSIS — G89.29 CHRONIC LOW BACK PAIN, UNSPECIFIED BACK PAIN LATERALITY, UNSPECIFIED WHETHER SCIATICA PRESENT: ICD-10-CM

## 2022-01-04 DIAGNOSIS — J30.89 ALLERGIC RHINITIS DUE TO OTHER ALLERGIC TRIGGER, UNSPECIFIED SEASONALITY: ICD-10-CM

## 2022-01-04 DIAGNOSIS — M54.50 CHRONIC LOW BACK PAIN, UNSPECIFIED BACK PAIN LATERALITY, UNSPECIFIED WHETHER SCIATICA PRESENT: ICD-10-CM

## 2022-01-04 DIAGNOSIS — E03.9 HYPOTHYROIDISM, UNSPECIFIED TYPE: ICD-10-CM

## 2022-01-04 DIAGNOSIS — R41.3 MEMORY LOSS: ICD-10-CM

## 2022-01-04 DIAGNOSIS — G89.4 CHRONIC PAIN SYNDROME: ICD-10-CM

## 2022-01-04 DIAGNOSIS — E04.1 THYROID NODULE: Primary | ICD-10-CM

## 2022-01-04 PROCEDURE — 99214 OFFICE O/P EST MOD 30 MIN: CPT | Performed by: INTERNAL MEDICINE

## 2022-01-04 RX ORDER — LEVOTHYROXINE SODIUM 0.1 MG/1
100 TABLET ORAL DAILY
Qty: 90 TABLET | Refills: 3 | Status: SHIPPED | OUTPATIENT
Start: 2022-01-04 | End: 2022-03-31 | Stop reason: SDUPTHER

## 2022-01-04 RX ORDER — OMEPRAZOLE 20 MG/1
20 CAPSULE, DELAYED RELEASE ORAL DAILY
COMMUNITY
End: 2022-03-31 | Stop reason: CLARIF

## 2022-01-04 RX ORDER — NITROFURANTOIN MACROCRYSTALS 50 MG/1
CAPSULE ORAL
Qty: 90 CAPSULE | Refills: 1 | Status: SHIPPED | OUTPATIENT
Start: 2022-01-04

## 2022-01-04 RX ORDER — LEVOCETIRIZINE DIHYDROCHLORIDE 5 MG/1
5 TABLET, FILM COATED ORAL NIGHTLY
Qty: 30 TABLET | Refills: 0 | Status: SHIPPED | OUTPATIENT
Start: 2022-01-04 | End: 2022-04-26

## 2022-01-04 ASSESSMENT — ENCOUNTER SYMPTOMS
BACK PAIN: 0
ABDOMINAL PAIN: 0
SHORTNESS OF BREATH: 0
EYE PAIN: 0

## 2022-01-04 NOTE — PROGRESS NOTES
Not on file   Stress:     Feeling of Stress : Not on file   Social Connections:     Frequency of Communication with Friends and Family: Not on file    Frequency of Social Gatherings with Friends and Family: Not on file    Attends Anabaptist Services: Not on file    Active Member of [address] [place] [address] [employer] or Organizations: Not on file    Attends Club or Organization Meetings: Not on file    Marital Status: Not on file   Intimate Partner Violence:     Fear of Current or Ex-Partner: Not on file    Emotionally Abused: Not on file    Physically Abused: Not on file    Sexually Abused: Not on file   Housing Stability:     Unable to Pay for Housing in the Last Year: Not on file    Number of [place] in the Last Year: Not on file    Unstable Housing in the Last Year: Not on file     Allergies   Allergen Reactions    Amoxicillin Hives    Nsaids Other (See Comments)     STROKE/NO NSAIDS ALLOWED    Pcn [Penicillins] Rash     Current Outpatient Medications on File Prior to Visit   Medication Sig Dispense Refill    omeprazole (PRILOSEC) 20 MG delayed release capsule Take 20 mg by mouth daily      fluticasone (FLONASE) 50 MCG/ACT nasal spray 1 spray by Each Nostril route daily as needed       aspirin 81 MG tablet Take 81 mg by mouth Takes 2 tablets daily       No current facility-administered medications on file prior to visit. I have personally reviewed the ROS, PMH, PFH, and social history     Review of Systems   Constitutional: Negative for chills and fever. HENT: Negative for congestion. Eyes: Negative for pain. Respiratory: Negative for shortness of breath. Cardiovascular: Negative for chest pain. Gastrointestinal: Negative for abdominal pain. Genitourinary: Negative for hematuria. Musculoskeletal: Negative for back pain. Allergic/Immunologic: Negative for immunocompromised state. Neurological: Negative for headaches. Psychiatric/Behavioral: Negative for hallucinations.        Objective:   BP 118/64   Pulse 71   Temp 97.7 °F (36.5 °C) (Infrared)   Ht 5' 6\" (1.676 m)   Wt 144 lb (65.3 kg)   LMP 03/18/2019 (Approximate)   SpO2 99%   BMI 23.24 kg/m²     Physical Exam  Constitutional:       General: She is not in acute distress. Appearance: Normal appearance. She is not ill-appearing, toxic-appearing or diaphoretic. HENT:      Head: Normocephalic. Neck:      Vascular: No carotid bruit. Cardiovascular:      Rate and Rhythm: Normal rate and regular rhythm. Pulses: Normal pulses. Heart sounds: Normal heart sounds. No murmur heard. No friction rub. No gallop. Pulmonary:      Effort: Pulmonary effort is normal. No respiratory distress. Breath sounds: Normal breath sounds. No wheezing, rhonchi or rales. Abdominal:      General: Abdomen is flat. There is no distension. Palpations: Abdomen is soft. Tenderness: There is no abdominal tenderness. There is no right CVA tenderness, left CVA tenderness, guarding or rebound. Musculoskeletal:      Cervical back: Neck supple. Right lower leg: No edema. Left lower leg: No edema. Skin:     General: Skin is warm. Findings: No erythema or rash. Neurological:      Mental Status: She is alert. Psychiatric:         Mood and Affect: Mood normal.           Assessment:       Diagnosis Orders   1. Thyroid nodule  US HEAD NECK SOFT TISSUE THYROID   2. Chronic pain syndrome     3. Hypothyroidism, unspecified type  levothyroxine (SYNTHROID) 100 MCG tablet   4. Recurrent UTI  nitrofurantoin (MACRODANTIN) 50 MG capsule   5. Allergic rhinitis due to other allergic trigger, unspecified seasonality  levocetirizine (XYZAL) 5 MG tablet         Plan:     vc  Rheum appt 1/13/2022 (chronic pain/?fibro?, will ask him to review guanaco studies, etc)   Macrobid frquent uti (post coital)  But also see OB. GYN please   MRI Brain she will reschedule. Referred   See rheum and if no clear cause see ortho.    Had 1 st dose moderna,   Call when you want the flu shot 2022, wants to hold off    [facility] 08/2022  F/U with ob/gyn  Thyroid US in 12/2023, if stable, no further fu           Orders Placed This Encounter   Procedures    US HEAD NECK SOFT TISSUE THYROID     This procedure can be scheduled via [facility]. Access your [facility] account by visiting [email or web address]. Standing Status:   Future     Standing Expiration Date:   6/5/2024     Order Specific Question:   Reason for exam:     Answer:   FU     Orders Placed This Encounter   Medications    nitrofurantoin (MACRODANTIN) 50 MG capsule     Sig: Oral: 50 mg as a single dose taken within 2 hours of sexual intercourse     Dispense:  90 capsule     Refill:  1    levothyroxine (SYNTHROID) 100 MCG tablet     Sig: Take 1 tablet by mouth daily     Dispense:  90 tablet     Refill:  3    levocetirizine (XYZAL) 5 MG tablet     Sig: Take 1 tablet by mouth nightly     Dispense:  30 tablet     Refill:  0   Patient was offered pregnancy test, she declined, she understands risks of birth defects. . She doesn't tolerate most antihistamines. FCE (Not done)   Take probiotics and yogurt and call asap if any diarrhea. Call immediately if not better or persistent symptoms. Risk of pulmonary fibrosis with  macrobid   If anything should change or worsen call ASAP, don't wait for next scheduled appointment. Return in about 6 weeks (around 2/15/2022) for Chronic condition management/appointment, worsening symptoms, call ASAP for appointment.       Marnie Khoury MD

## 2022-01-26 DIAGNOSIS — F41.9 ANXIETY: Primary | ICD-10-CM

## 2022-01-26 RX ORDER — HYDROXYZINE PAMOATE 50 MG/1
CAPSULE ORAL
Qty: 1 CAPSULE | Refills: 0 | Status: SHIPPED | OUTPATIENT
Start: 2022-01-26 | End: 2022-01-27 | Stop reason: ALTCHOICE

## 2022-01-27 ENCOUNTER — OFFICE VISIT (OUTPATIENT)
Dept: FAMILY MEDICINE CLINIC | Age: 52
End: 2022-01-27
Payer: COMMERCIAL

## 2022-01-27 VITALS
BODY MASS INDEX: 23.63 KG/M2 | DIASTOLIC BLOOD PRESSURE: 70 MMHG | TEMPERATURE: 97.2 F | WEIGHT: 147 LBS | HEIGHT: 66 IN | HEART RATE: 64 BPM | OXYGEN SATURATION: 98 % | SYSTOLIC BLOOD PRESSURE: 108 MMHG

## 2022-01-27 DIAGNOSIS — M25.50 POLYARTHRALGIA: Primary | ICD-10-CM

## 2022-01-27 DIAGNOSIS — M25.551 BILATERAL HIP PAIN: ICD-10-CM

## 2022-01-27 DIAGNOSIS — R73.01 IMPAIRED FASTING GLUCOSE: ICD-10-CM

## 2022-01-27 DIAGNOSIS — M25.552 BILATERAL HIP PAIN: ICD-10-CM

## 2022-01-27 DIAGNOSIS — M17.11 OSTEOARTHRITIS OF RIGHT KNEE, UNSPECIFIED OSTEOARTHRITIS TYPE: ICD-10-CM

## 2022-01-27 LAB — HBA1C MFR BLD: 5.3 %

## 2022-01-27 PROCEDURE — 83036 HEMOGLOBIN GLYCOSYLATED A1C: CPT | Performed by: STUDENT IN AN ORGANIZED HEALTH CARE EDUCATION/TRAINING PROGRAM

## 2022-01-27 PROCEDURE — 20610 DRAIN/INJ JOINT/BURSA W/O US: CPT | Performed by: STUDENT IN AN ORGANIZED HEALTH CARE EDUCATION/TRAINING PROGRAM

## 2022-01-27 PROCEDURE — 99214 OFFICE O/P EST MOD 30 MIN: CPT | Performed by: STUDENT IN AN ORGANIZED HEALTH CARE EDUCATION/TRAINING PROGRAM

## 2022-01-27 RX ORDER — TRIAMCINOLONE ACETONIDE 40 MG/ML
40 INJECTION, SUSPENSION INTRA-ARTICULAR; INTRAMUSCULAR ONCE
Status: COMPLETED | OUTPATIENT
Start: 2022-01-27 | End: 2022-01-27

## 2022-01-27 RX ADMIN — TRIAMCINOLONE ACETONIDE 40 MG: 40 INJECTION, SUSPENSION INTRA-ARTICULAR; INTRAMUSCULAR at 11:27

## 2022-01-27 NOTE — PROGRESS NOTES
Subjective  Mayela Patel, 46 y.o. female presents today with:  Chief Complaint   Patient presents with    Joint Pain     States she is having increase in joint pain, did see Rheum. allie. Was given some options from [facility]. would like to discuss. HPI     Patient with appointment for follow-up for polyarthralgias and likely osteoarthritis. She recently saw rheumatology through [facility] earlier this week. She states that they told her it was likely arthritis. She states they offered her a joint injection but they would have had to have done new imaging because they were unable to see the images that were taken through [address] [place] [address]. She states she did not have that done. She is requesting joint injection into the right knee today. She does have arthritis in that knee. Patient has longstanding history of polyarthralgias. It has been going on for many years. She has been evaluated by multiple rheumatologist.  She states that in 2018 she saw Dr. Spencer Childers he diagnosed her with fibromyalgia. She states that she recently sought out a second opinion at [facility] and they diagnosed her with arthritis. She is scheduled for follow-up there in about a month. They also recommended various over-the-counter creams to help with her pain. She has not tried these yet. She states she is also been doing home exercises. She states that [facility] also recommended possible physical therapy. She states she would be interested in possible physical therapy. She states that she is very frustrated with her joint pain as it is affecting her daily life. She states she is unable to work due to pain at times. She does have a history of stroke and is on aspirin. This limits her ability to use NSAIDs. She has no other concerns at this time    Review of Systems   Constitutional: Negative for chills, fatigue, fever and unexpected weight change. HENT: Negative for congestion, rhinorrhea and sore throat.     Respiratory: Negative for cough, shortness of breath and wheezing. Cardiovascular: Negative for chest pain, palpitations and leg swelling. Gastrointestinal: Negative for abdominal pain, diarrhea, nausea and vomiting. Musculoskeletal: Positive for arthralgias, gait problem and joint swelling. Skin: Negative for rash. Neurological: Negative for weakness, light-headedness, numbness and headaches. Psychiatric/Behavioral: Negative for confusion. The patient is not nervous/anxious. Past Medical History:   Diagnosis Date    Abnormal Pap smear of cervix     Depression     Depression with anxiety     Depression with anxiety     Iron deficiency anemia 4/12/2019    Stroke ([address] 05/2016    Idiopathic she is still in process     Thyroid disease      Past Surgical History:   Procedure Laterality Date    BLADDER SURGERY      done by Dr. [facility] x10 yrs    COLONOSCOPY  03/04/2019    KERRY AMADO MD    DILATION AND CURETTAGE      ENDOMETRIAL ABLATION      TONSILLECTOMY       Current Outpatient Medications   Medication Sig Dispense Refill    omeprazole (PRILOSEC) 20 MG delayed release capsule Take 20 mg by mouth daily      nitrofurantoin (MACRODANTIN) 50 MG capsule Oral: 50 mg as a single dose taken within 2 hours of sexual intercourse 90 capsule 1    levothyroxine (SYNTHROID) 100 MCG tablet Take 1 tablet by mouth daily 90 tablet 3    levocetirizine (XYZAL) 5 MG tablet Take 1 tablet by mouth nightly 30 tablet 0    fluticasone (FLONASE) 50 MCG/ACT nasal spray 1 spray by Each Nostril route daily as needed       aspirin 81 MG tablet Take 81 mg by mouth Takes 2 tablets daily       No current facility-administered medications for this visit. PMH, Surgical Hx, Family Hx, and Social Hx reviewed and updated. Health Maintenance reviewed.     Objective    Vitals:    01/27/22 0953   BP: 108/70   Pulse: 64   Temp: 97.2 °F (36.2 °C)   SpO2: 98%   Weight: 147 lb (66.7 kg)   Height: 5' 6\" (1.676 m)       Physical Exam  Vitals reviewed. Constitutional:       General: She is not in acute distress. HENT:      Head: Normocephalic and atraumatic. Eyes:      Conjunctiva/sclera: Conjunctivae normal.   Neck:      Thyroid: No thyromegaly or thyroid tenderness. Cardiovascular:      Rate and Rhythm: Normal rate and regular rhythm. Heart sounds: No murmur heard. No friction rub. No gallop. Pulmonary:      Effort: Pulmonary effort is normal.      Breath sounds: Normal breath sounds. No wheezing, rhonchi or rales. Musculoskeletal:         General: No swelling or deformity. Cervical back: Normal range of motion and neck supple. Right lower leg: No edema. Left lower leg: No edema. Comments: Patient with decreased range of motion with flexion extension of the right knee. She does have some tenderness along the joint line. No joint effusion noted. Negative anterior and posterior drawer test.   Lymphadenopathy:      Cervical: No cervical adenopathy. Skin:     General: Skin is warm and dry. Findings: No rash. Neurological:      General: No focal deficit present. Mental Status: She is alert. Gait: Gait is intact. Psychiatric:         Mood and Affect: Mood normal.         Behavior: Behavior normal.        Results for POC orders placed in visit on 01/27/22   POCT glycosylated hemoglobin (Hb A1C)   Result Value Ref Range    Hemoglobin A1C 5.3 %       Assessment & Plan     1. Polyarthralgia  Patient with polyarthralgias. She is following with rheumatology. She is instructed to keep that appointment as scheduled. I also place referral to physical therapy so she can do this as well. Joint injection provided to the right knee. She is instructed to use Voltaren gel and other creams as recommended by rheumatology. Follow-up with PCP as scheduled  - [facility] - [place]    2.  Osteoarthritis of right knee, unspecified osteoarthritis type  Patient with osteoarthritis of the right knee.  PT referral placed. Joint injection done in the office today. Follow-up with PCP as scheduled. - [facility] - [place]  - triamcinolone acetonide (KENALOG-40) injection 40 mg    3. Impaired fasting glucose  Patient with impaired fasting glucose. A1c today is 5.3%. Continue to monitor. Follow-up with PCP.  - POCT glycosylated hemoglobin (Hb A1C)    4. Bilateral hip pain  Patient with bilateral hip pain. Referral to physical therapy provided. Follow-up with PCP. - [facility] - [place]    Orders Placed This Encounter   Procedures    [facility] [place]     Referral Priority:   Routine     Referral Type:   Eval and Treat     Referral Reason:   Specialty Services Required     Requested Specialty:   Physical Therapy     Number of Visits Requested:   1    POCT glycosylated hemoglobin (Hb A1C)     Orders Placed This Encounter   Medications    triamcinolone acetonide (KENALOG-40) injection 40 mg     Medications Discontinued During This Encounter   Medication Reason    hydrOXYzine (VISTARIL) 50 MG capsule Therapy completed     Return As scheduled with PCP. Joint Injection Procedure Note    Indication: right knee osteoarthritis  After having explained the potential risks and benefits of the right knee injection, the patient gave verbal consent to proceed. The patient's identifiers were confirmed with a timeout, including name and date of birth (and that no heat source or antibiotics were utilized with this procedure). The site was then confirmed and marked, and the area was prepped in aseptic fashion with Betadine. A 25gauge, 1.5inch needle was directed into the above area. The injection was completed with 40 mg of Kenalog mixed with 3 cc of 1% Lidocaine. The patient tolerated the procedure without difficulty and was instructed on post-injection care. Reviewed with the patient: current clinical status,medications, activities and diet. Side effects, adverse effects of themedication prescribed today, as well as treatment plan/ rationale and result expectations have been discussed with the patient who expresses understanding and desires to proceed. Close follow up to evaluate treatment results and for coordination of care. I have reviewed the patient's medical history in detail and updated the computerized patient record. Please note, this report has been partially produced using speech recognition software and may cause  and /or contain errors related to that system including grammar, punctuation and spelling as well as words and phrases that may seem inappropriate. If there are questions or concerns please feel free to contact me to clarify.     Dimitri Michaud, DO

## 2022-01-28 ASSESSMENT — ENCOUNTER SYMPTOMS
RHINORRHEA: 0
COUGH: 0
SHORTNESS OF BREATH: 0
DIARRHEA: 0
NAUSEA: 0
SORE THROAT: 0
VOMITING: 0
ABDOMINAL PAIN: 0
WHEEZING: 0

## 2022-02-08 ENCOUNTER — HOSPITAL ENCOUNTER (OUTPATIENT)
Dept: PHYSICAL THERAPY | Age: 52
Setting detail: THERAPIES SERIES
Discharge: HOME | End: 2022-02-08
Payer: COMMERCIAL

## 2022-02-08 PROCEDURE — 97162 PT EVAL MOD COMPLEX 30 MIN: CPT

## 2022-02-08 PROCEDURE — 97110 THERAPEUTIC EXERCISES: CPT

## 2022-02-08 ASSESSMENT — PAIN DESCRIPTION - FREQUENCY: FREQUENCY: CONTINUOUS

## 2022-02-08 ASSESSMENT — PAIN DESCRIPTION - ONSET: ONSET: AWAKENED FROM SLEEP

## 2022-02-08 ASSESSMENT — PAIN SCALES - GENERAL: PAINLEVEL_OUTOF10: 8

## 2022-02-08 ASSESSMENT — PAIN DESCRIPTION - PROGRESSION: CLINICAL_PROGRESSION: GRADUALLY WORSENING

## 2022-02-08 ASSESSMENT — PAIN DESCRIPTION - LOCATION: LOCATION: KNEE;HIP

## 2022-02-08 ASSESSMENT — PAIN - FUNCTIONAL ASSESSMENT: PAIN_FUNCTIONAL_ASSESSMENT: PREVENTS OR INTERFERES WITH ALL ACTIVE AND SOME PASSIVE ACTIVITIES

## 2022-02-08 ASSESSMENT — PAIN DESCRIPTION - ORIENTATION: ORIENTATION: RIGHT

## 2022-02-08 ASSESSMENT — PAIN DESCRIPTION - DESCRIPTORS: DESCRIPTORS: ACHING;BURNING;SHARP;SHOOTING

## 2022-02-08 NOTE — PROGRESS NOTES
[facility] ([facility]) Physical Therapy-  [place]  PHYSICAL THERAPY EVALUATION    Date: 2022  Patient Name: Sherry Negro       MRN: [id number]   Account: [de-identified]   : 1970  (46 y.o.)   Gender: female   Referring Practitioner: Dr. Pepe Black                 Diagnosis: Polyarthralgia, OA R knee, B hip pain  Treatment Diagnosis: B hip pain, R knee OA  Additional Pertinent Hx: Stroke 2016 no residual effects, Fibromyalgia, OA             Past Medical History:  has a past medical history of Abnormal Pap smear of cervix, Depression, Depression with anxiety, Depression with anxiety, Iron deficiency anemia, Stroke ([facility] [address] and Thyroid disease. Past Surgical History:   has a past surgical history that includes Dilation & curettage; Endometrial ablation; Bladder surgery; Tonsillectomy; and Colonoscopy (2019). Vital Signs  Patient Currently in Pain: Yes   Pain Screening  Patient Currently in Pain: Yes  Pain Assessment  Pain Assessment: 0-10  Pain Level: 8 (R knee and B hips and really \"all over\")  Pain Location: Knee;Hip  Pain Orientation: Right  Pain Radiating Towards: Hip pain radiates to mid lat thigh. Denies NT  Pain Descriptors: Aching;Burning; Dorethea Graves; Shooting  Pain Frequency: Continuous  Pain Onset: Awakened from sleep  Clinical Progression: Gradually worsening  Functional Pain Assessment: Prevents or interferes with all active and some passive activities (Pt reports 50% current function compared to 75-80% last year and 100% in 2017)      Lives With: Spouse  Home Layout: Two level  Home Access: Stairs to enter with rails  Entrance Stairs - Number of Steps: 12 with one rail, nonrecip  Bathroom Shower/Tub: Tub/Shower unit  ADL Assistance: Independent  Homemaking Assistance: Independent  Homemaking Responsibilities: Yes  Ambulation Assistance: Independent  Transfer Assistance: Independent  Active : Yes  Mode of Transportation: Car  Occupation: Part time employment  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 Subjective:  Subjective: Pt reports long hx of pain and this episode of exacerbation started in R hip with an impingment diagnosis in 2017. Pt reports pain location is increasing in all joints and intensity is increasing and takes tylenol to take the edge off, reports not taking any additional medications due to hx of stroke. Had PT for hip impingement. Injection R knee one week ago, no change reported at this time, use heat to help with pain. Sleeps with pillow support to improve body alignment. Pt request reports sent to Dr Francisco Lynn, Primary care physician. Comments: RTD with Dr Francisco Lynn after MRI. 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 Degenerative changes are seen in their is facet arthropathy which could result in canal stenosis. 2. Degenerative changes are seen in the right knee are mild to moderate 3. Very mild degenerative changes are seen in the hips and pelvis. Objective:      Balance  Posture: Fair  Sitting - Static: Good  Sitting - Dynamic: Good  Standing - Static: Good  Standing - Dynamic: Good  Single Leg Stance R Leg: 3  Single Leg Stance L Leg: 10  Comments: Full squat with pain upon returning upright. No falls     Ambulation 1  Device: No Device  Assistance: Independent  Gait Deviations: Slow [place]  Distance: unequal wt shift-R LE unloading        Transfers  Sit to Stand: Independent  Stand to sit:  Independent    Strength RLE  Comment: Hip flex, Abd 4-5, Ext 4/5, knee 3+ to 4-/5, ankle 3+/5  Strength LLE  Comment: Hip 4/5, knee 4/5, ankle 4/5   AROM RLE (degrees)  RLE General AROM: Hip Flex 72, Abd 15, Ext 10, IR/ER 20,  Knee 0-102 with guarding (wearing a knee sleeve), ankle WNL     AROM LLE (degrees)  LLE General AROM: Hip flex 74, Abd 25, Ext 10, IR/ER 20,  Knee 0- 130, ankle WNL   Spine  Lumbar: Flex 50% with pain, Ext 50% with pain, SB B  Special Tests: Lumbar SLUMP (-), SLR(-) SOLOMON (R +), ELY's (tightness B), Crossed SLR (-), Scour (+R), Distraction(decrease pain), compression (no change), RIO's(-), Knee: Anterior drawer (-), varus stress(-), valgus stress(-), Lucila's(min pain), Apleys Compression(pain)/Distraction(decrease pain), Apprehension (+), patellar grind (-), Thessaly (- for knee, min pain in R hip)    Observation/Palpation  Posture: Fair  Palpation: Tightness B lumbar paraspinal, ASIS level in standing, medial B knee discomfort  Observation: Decreased lordotic curve, fwd head posture  Bed mobility  Rolling to Left: Independent  Rolling to Right: Independent  Supine to Sit: Independent  Sit to Supine: Independent   Additional Measures  Flexibility: Hamsting flexibility -35°R, -29°L at 90/90 hip/knee position. Exercises:   Exercises  Exercise 2: ham stretch 30 s x 3 R, gastroc stretch*  Exercise 3: piriformis R 20 s x 2 ( may need to perform modified in supine for comfort)  Exercise 4: hip add with ball*  Exercise 5: hip abd with band*  Exercise 6: ham curl with band*  Exercise 7: hip IR/ER charity and advance to band*  Exercise 20: HEP: ham stretch and piriformis  Modalities:  Modalities  Moist heat: *  Ultrasound: consider R knee medial knee joint*  Manual:  Manual therapy  PROM: B LE's*  Manual traction: B LE distraction*  Other: KT right knee*  *Indicates exercise,modality, or manual techniques to be initiated when appropriate  Assessment: Body structures, Functions, Activity limitations: Decreased functional mobility ,Increased pain,Decreased posture,Decreased ROM,Decreased strength  Assessment: The pt's impairments currently limit functional abilities by 72% including her abilities to stand, walk and sit , perform recreational actvities, and perform household/work related duties without pain or limitations. Skilled PT required to address about deficits to improve over function and return to prior level of function.  Pt declined aquatic therapy at this time and requiring <25% VC's throughout treatment  Short term goal 2: Decrease B hips and R knee pain 50% to assist with improved functional gains. Short term goal 3: Improve hamstring flexibility -20° @90/90 hip/knee flexion for improved posture awareness  Long term goals  Time Frame for Long term goals : 4 weeks  Long term goal 1: Indep HEP for symptom management  Long term goal 2: Pt demo improved overall function by reporting greater than 50% per functional survey score  Long term goal 3: Pain-free R knee 0-120 deg and R hip abd 25 deg AROM to WNL allowing an increase in ADL tolerance.   Long term goal 4: Improve R LE strength 4/5 to 4+/5 to  allow patient to recip stair climb 5 steps with one rail indep         PT Individual Minutes  Time In: 1106  Time Out: 1145  Minutes: 39  Timed Code Treatment Minutes: 8 Minutes  Procedure Minutes:30 min eval     Timed Activity Minutes Units   Ther Ex 8 1     Electronically signed by Arcadio Rodriguez PT on 2/8/22 at 1:24 PM EST

## 2022-02-08 NOTE — PROGRESS NOTES
[address] [place] [address]  [address]  XNB:[phone]    [] Certification  [] Recertification [x]  Plan of Care  [] Progress Note [] Discharge      To:  Dr. Meet Cartwright      From:  Bere Yi, PT  Patient: Ellis Stallings     : 1970  Diagnosis: Polyarthralgia, OA R knee, B hip pain     Date: 2022  Treatment Diagnosis: B hip pain, R knee OA       Progress Report Period from:  2022  to 2022    Total # of Visits to Date: 1   No Show: 0    Canceled Appointment: 0     OBJECTIVE:   Short Term Goals - Time Frame for Short term goals: 2 weeks    Goals Current/Discharge status  Met   Short term goal 1: The pt will demonstrate improved postural awareness requiring <25% VC's throughout treatment  Observation: Decreased lordotic curve, fwd head posture  [] yes  [x] no   Short term goal 2: Decrease B hips and R knee pain 50% to assist with improved functional gains. Pain Location: Knee,Hip    Pain Level: 8 (R knee and B hips and really \"all over\")    Pain Descriptors: Aching,Burning,Sharp,Shooting     [] yes  [x] no   Short term goal 3: Improve hamstring flexibility -20° @90/90 hip/knee flexion for improved posture awareness Flexibility: Hamsting flexibility -35°R, -29°L at 90/90 hip/knee position. [] yes  [x] no     Long Term Goals - Time Frame for Long term goals : 4 weeks  Goals Current/ Discharge status Met   Long term goal 1: Indep HEP for symptom management Written HEP initiated  for symptom management  Needs progression for comprehensive program development. [] yes  [x] no   Long term goal 2: Pt demo improved overall function by reporting greater than 50% per functional survey score Exam: LEFS 22/80=28% functional   [] yes  [x] no   Long term goal 3: Pain-free R knee 0-120 deg and R hip abd 25 deg AROM to WNL allowing an increase in ADL tolerance.  RLE General AROM: Hip Flex 72, Abd 15, Ext 10, IR/ER 20,  Knee 0-102 with guarding (wearing a knee sleeve), ankle WNL     LLE General AROM: Hip flex 74, Abd 25, Ext 10, IR/ER 20,  Knee 0- 130, ankle WNL    [] yes  [x] no   Long term goal 4: Improve R LE strength 4/5 to 4+/5 to  allow patient to recip stair climb 5 steps with one rail indep Strength RLE  Comment: Hip flex, Abd 4-5, Ext 4/5, knee 3+ to 4-/5, ankle 3+/5    [] yes  [x] no       Body structures, Functions, Activity limitations: Decreased functional mobility ,Increased pain,Decreased posture,Decreased ROM,Decreased strength  Assessment: The pt's impairments currently limit functional abilities by 72% including her abilities to stand, walk and sit , perform recreational actvities, and perform household/work related duties without pain or limitations. Skilled PT required to address about deficits to improve over function and return to prior level of function. Pt declined to initiate aquatics however reports will consider in the future if decreased tolerance to land ex's. Prognosis: Good  Discharge Recommendations: Continue to assess pending progress    Special Tests: Lumbar SLUMP (-), SLR(-) SOLOMON (R +), ELY's (tightness B), Crossed SLR (-), Scour (+R), Distraction(decrease pain), compression (no change), RIO's(-), Knee: Anterior drawer (-), varus stress(-), valgus stress(-), Lucila's(min pain), Apleys Compression(pain)/Distraction(decrease pain), Apprehension (+), patellar grind (-), Thessaly (- for knee, min pain in R hip)     PT Education: Goals;PT Role;Plan of Care;Home Exercise Program    PLAN: [x] Evaluate and Treat  Frequency/Duration:  Plan  Times per week: 2  Plan weeks: 4-6  Current Treatment Recommendations: Strengthening,ROM,Neuromuscular Re-education,Home Exercise Program,Modalities,Manual Therapy - Soft Tissue Mobilization                 Patient Status:[x] Continue/ Initiate plan of Care    [] Discharge PT. Recommend pt continue with HEP.      [] Additional visits requested, Please re-certify for additional visits:          Signature: Electronically signed by Faye Erickson PT on 2/8/22 at 1:19 PM EST      If you have any questions or concerns, please don't hesitate to call. Thank you for your referral.    I have reviewed this plan of care and certify a need for medically necessary rehabilitation services.     Physician Signature:__________________________________________________________  Date:  Please sign and return

## 2022-02-10 ENCOUNTER — TELEPHONE (OUTPATIENT)
Dept: FAMILY MEDICINE CLINIC | Age: 52
End: 2022-02-10

## 2022-02-10 NOTE — TELEPHONE ENCOUNTER
----- Message from Se Aly sent at 2/10/2022  9:08 AM EST -----  Subject: Referral Request    QUESTIONS   Reason for referral request? Neurologist   Has the physician seen you for this condition before? Yes  Select a date? 2021-08-26  Select the Provider the patient wants to be referred to, if known (PCP or   Specialist)? Ilda Waller   Preferred Specialist (if applicable)? Do you already have an appointment scheduled? Yes  Select Scheduled Date? 2022-04-26  Select Scheduled Physician? Glenn Giles   Additional Information for Provider? Patient would like a new referral as   she is unable to get in until April  ---------------------------------------------------------------------------  --------------  [address]  What is the best way for the office to contact you? OK to leave message on   voicemail  Preferred Call Back Phone Number?  [phone] The Medication Reconciliation process has been completed by interviewing the patient    Allergies have been reviewed  Antibiotic use in 30 days - none    Home Pharmacy:  [facility] [place]

## 2022-02-15 ENCOUNTER — HOSPITAL ENCOUNTER (OUTPATIENT)
Dept: PHYSICAL THERAPY | Age: 52
Setting detail: THERAPIES SERIES
Discharge: HOME | End: 2022-02-15
Payer: COMMERCIAL

## 2022-02-15 PROCEDURE — 97110 THERAPEUTIC EXERCISES: CPT

## 2022-02-15 ASSESSMENT — PAIN DESCRIPTION - FREQUENCY: FREQUENCY: CONTINUOUS

## 2022-02-15 ASSESSMENT — PAIN DESCRIPTION - ORIENTATION: ORIENTATION: RIGHT;LEFT

## 2022-02-15 ASSESSMENT — PAIN DESCRIPTION - LOCATION: LOCATION: HIP

## 2022-02-15 ASSESSMENT — PAIN SCALES - GENERAL: PAINLEVEL_OUTOF10: 7

## 2022-02-15 ASSESSMENT — PAIN DESCRIPTION - ONSET: ONSET: AWAKENED FROM SLEEP

## 2022-02-15 ASSESSMENT — PAIN DESCRIPTION - PROGRESSION: CLINICAL_PROGRESSION: GRADUALLY WORSENING

## 2022-02-15 ASSESSMENT — PAIN DESCRIPTION - DESCRIPTORS: DESCRIPTORS: BURNING

## 2022-02-15 NOTE — PROGRESS NOTES
[address]  [address]  NZAAP:[phone]        Date: 2/15/2022  Patient: Yoana Cotton  : 1970  ACCT #: [de-identified]  Referring Practitioner: Dr. Guanakito Mead  Diagnosis: Polyarthralgia, OA R knee, B hip pain  Treatment Diagnosis: B hip pain, R knee OA    Visit Information:  PT Visit Information  Onset Date:  (Pain started in  and progressively worsening and increasing in all joints.)  PT Insurance Information: BCBS  Total # of Visits Approved: 48 (50 visits alexandre yr combined with pt/ot/sp, $30 copay)  Total # of Visits to Date: 2  No Show: 0  Canceled Appointment: 0  Progress Note Counter:     Subjective: Both hips are hurting really bad today. She feels like she did something to her low back yesterday; \"It feels twisted. \" She had a cortisone injection in her R knee recently and thinks thats helping a little. She also wears a compression sleeve on her right knee when she stands for a long period of time, such as at work. Comments: RTD with Dr Juliana Rosales after MRI. 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 Degenerative changes are seen in their is facet arthropathy which could result in canal stenosis. 2. Degenerative changes are seen in the right knee are mild to moderate 3. Very mild degenerative changes are seen in the hips and pelvis.   HEP Compliance:  [x] Good [] Fair [] Poor [] Reports not doing due to:    Vital Signs  Patient Currently in Pain: Yes   Pain Screening  Patient Currently in Pain: Yes  Pain Assessment  Pain Assessment: 0-10  Pain Level: 7  Pain Location: Hip  Pain Orientation: Right;Left (R>L)  Pain Descriptors: Burning  Pain Frequency: Continuous  Pain Onset: Awakened from sleep  Clinical Progression: Gradually worsening    OBJECTIVE:   Exercises  Exercise 2: ham stretch B 3 x 30\" , gastroc stretch*  Exercise 3: piriformis B 2 x 20\" , supine piriformis 3 x 20\"  Exercise 4: hip add with ball 10 x 5\"  Exercise 5: hip abd with BTB 10 x 5\"  Exercise 6: ham curl with GTB 10 x 5\"  Exercise 7: hip IR/ER charity 10 x 5\" ea  (advance to band when approp.)  Exercise 20: HEP: ham stretch and piriformis. Updated HEP on 2/15/22: supine piriformis, hip add/abd/IR/ER isos, hs curls    Strength: [x] NT  [] MMT completed:    ROM: [x] NT  [] ROM measurements:    Manual:   Manual therapy  PROM: B LE's*  Manual traction: B LE distraction 10 x 15\" (3 min)  Other: KT right knee* - not appropriate dress attire    *Indicates exercise, modality, or manual techniques to be initiated when appropriate    Assessment: Body structures, Functions, Activity limitations: Decreased functional mobility ,Increased pain,Decreased posture,Decreased ROM,Decreased strength  Assessment: Initiated B hip/R knee stab therex this date. Reviewed HS and piriformis stretch from initial HEP. Treatment Diagnosis: B hip pain, R knee OA  Prognosis: Good    Goals:  Short term goals  Time Frame for Short term goals: 2 weeks  Short term goal 1: The pt will demonstrate improved postural awareness requiring <25% VC's throughout treatment  Short term goal 2: Decrease B hips and R knee pain 50% to assist with improved functional gains. Short term goal 3: Improve hamstring flexibility -20° @90/90 hip/knee flexion for improved posture awareness    Long term goals  Time Frame for Long term goals : 4 weeks  Long term goal 1: Indep HEP for symptom management  Long term goal 2: Pt demo improved overall function by reporting greater than 50% per functional survey score  Long term goal 3: Pain-free R knee 0-120 deg and R hip abd 25 deg AROM to WNL allowing an increase in ADL tolerance. Long term goal 4: Improve R LE strength 4/5 to 4+/5 to  allow patient to recip stair climb 5 steps with one rail indep  Progress toward goals: Began treatment this date. Updated written HEP.      POST-PAIN       Pain Rating (0-10 pain scale):   6/10    Location and pain description same as pre-treatment unless indicated. Action: [] NA   [x] Perform HEP  [x] Meds as prescribed  [] Modalities as prescribed   [] Call Physician     Frequency/Duration:  Plan  Times per week: 2  Plan weeks: 4-6  Current Treatment Recommendations: Strengthening,ROM,Neuromuscular Re-education,Home Exercise Program,Modalities,Manual Therapy - Soft Tissue Mobilization    Pt to continue current HEP. See objective section for any therapeutic exercise changes, additions or modifications this date.     PT Individual Minutes  Time In: 1016  Time Out: 1057  Minutes: 41  Timed Code Treatment Minutes: 41 Minutes     Timed Activity Minutes Units   Ther Ex 38 3   Manual  3 0       Signature:  Electronically signed by Barrera Granados PTA on 2/15/22 at 11:33 AM EST

## 2022-02-17 ENCOUNTER — APPOINTMENT (OUTPATIENT)
Dept: PHYSICAL THERAPY | Age: 52
End: 2022-02-17
Payer: COMMERCIAL

## 2022-02-23 ENCOUNTER — TELEPHONE (OUTPATIENT)
Dept: FAMILY MEDICINE CLINIC | Age: 52
End: 2022-02-23

## 2022-02-23 NOTE — TELEPHONE ENCOUNTER
----- Message from Tasha Flores sent at 2/23/2022 12:33 PM EST -----  Subject: Message to Provider    QUESTIONS  Information for Provider? Patient stated she took LORazepam (ATIVAN) 0.5   MG tablet and still was unable to have MRI done . Patient stated she was   advice she could sedated to have MRI done and is requesting the next   steps.   ---------------------------------------------------------------------------  --------------  CALL BACK INFO  What is the best way for the office to contact you? OK to leave message on   voicemail  Preferred Call Back Phone Number? [phone]  ---------------------------------------------------------------------------  --------------  SCRIPT ANSWERS  Relationship to Patient?  Self

## 2022-02-24 ENCOUNTER — HOSPITAL ENCOUNTER (OUTPATIENT)
Dept: PHYSICAL THERAPY | Age: 52
Setting detail: THERAPIES SERIES
Discharge: HOME | End: 2022-02-24
Payer: COMMERCIAL

## 2022-02-24 ENCOUNTER — PATIENT MESSAGE (OUTPATIENT)
Dept: FAMILY MEDICINE CLINIC | Age: 52
End: 2022-02-24

## 2022-02-24 PROCEDURE — 97110 THERAPEUTIC EXERCISES: CPT

## 2022-02-24 ASSESSMENT — PAIN DESCRIPTION - ORIENTATION: ORIENTATION: RIGHT

## 2022-02-24 ASSESSMENT — PAIN DESCRIPTION - DESCRIPTORS: DESCRIPTORS: BURNING

## 2022-02-24 ASSESSMENT — PAIN DESCRIPTION - FREQUENCY: FREQUENCY: CONTINUOUS

## 2022-02-24 ASSESSMENT — PAIN DESCRIPTION - LOCATION: LOCATION: HIP

## 2022-02-24 ASSESSMENT — PAIN DESCRIPTION - PROGRESSION: CLINICAL_PROGRESSION: GRADUALLY WORSENING

## 2022-02-24 ASSESSMENT — PAIN SCALES - GENERAL: PAINLEVEL_OUTOF10: 7

## 2022-02-24 ASSESSMENT — PAIN DESCRIPTION - PAIN TYPE: TYPE: CHRONIC PAIN

## 2022-02-24 NOTE — PROGRESS NOTES
[address] [place] [address]  [address]  INFCX:[phone]        Date: 2022  Patient: Fernanda Zuleta  : 1970  ACCT #: [de-identified]  Referring Practitioner: Dr. Bruno Torres  Diagnosis: Polyarthralgia, OA R knee, B hip pain  Treatment Diagnosis: B hip pain, R knee OA    Visit Information:  PT Visit Information  Onset Date:  (Pain started in  and progressively worsening and increasing in all joints.)  PT Insurance Information: BCBS  Total # of Visits Approved: 48 (50 visits alexandre yr combined with pt/ot/sp, $30 copay)  Total # of Visits to Date: 3  No Show: 0  Canceled Appointment: 0  Progress Note Counter: 3/12    Subjective: Pt stated that she is feeling most of her pain in her low back today. She has pain everywhere in her knees R>L, and her hips. Stated still trying to have a MRI of the brain d/t memory changes. Comments: RTD with Dr Gaitan  after MRI. 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 Degenerative changes are seen in their is facet arthropathy which could result in canal stenosis. 2. Degenerative changes are seen in the right knee are mild to moderate 3. Very mild degenerative changes are seen in the hips and pelvis.   HEP Compliance:  [] Good [x] Fair [] Poor [] Reports not doing due to:    Vital Signs  Patient Currently in Pain: Yes   Pain Screening  Patient Currently in Pain: Yes  Pain Assessment  Pain Assessment: 0-10  Pain Level: 7  Pain Type: Chronic pain  Pain Location: Hip  Pain Orientation: Right  Pain Descriptors: Burning  Pain Frequency: Continuous  Clinical Progression: Gradually worsening    OBJECTIVE:   Exercises  Exercise 2: ham stretch B 5 x 10\" supine , gastroc stretch*  Exercise 3: piriformis B 2 x 20\" , supine piriformis 3 x 20\"  Exercise 4: hip add with ball 10 x 5\"  Exercise 5: hip abd with BTB 10 x 5\"  Exercise 6: ham curl with GTB 10 x 5\"  Exercise 7: hip IR/ER charity 10 x 5\" ea (advance to band when approp.)  Exercise 20: HEP: ham stretch and piriformis. Updated HEP on 2/15/22: supine piriformis, hip add/abd/IR/ER isos, hs curls     Strength: [x] NT  [] MMT completed:       ROM: [x] NT  [] ROM measurements:      Manual:   Manual therapy  PROM: B LE's*  Manual traction: B LE distraction 10 x 15\" (3 min)  Other: KT right knee* - not appropriate dress attire    Modalities:  Modalities  Moist heat: *  Ultrasound: consider R knee medial knee joint x 6 mins 1.2 w/cm2  1. mh     *Indicates exercise, modality, or manual techniques to be initiated when appropriate    Assessment: Body structures, Functions, Activity limitations: Decreased functional mobility ,Increased pain,Decreased posture,Decreased ROM,Decreased strength  Assessment: Reviewed B hip/R knee stab therex and ROM this visit. US completed to the R knee to help manage pain levels. Treatment Diagnosis: B hip pain, R knee OA  Prognosis: Good     Goals:  Short term goals  Time Frame for Short term goals: 2 weeks  Short term goal 1: The pt will demonstrate improved postural awareness requiring <25% VC's throughout treatment  Short term goal 2: Decrease B hips and R knee pain 50% to assist with improved functional gains. Short term goal 3: Improve hamstring flexibility -20° @90/90 hip/knee flexion for improved posture awareness    Long term goals  Time Frame for Long term goals : 4 weeks  Long term goal 1: Indep HEP for symptom management  Long term goal 2: Pt demo improved overall function by reporting greater than 50% per functional survey score  Long term goal 3: Pain-free R knee 0-120 deg and R hip abd 25 deg AROM to WNL allowing an increase in ADL tolerance.   Long term goal 4: Improve R LE strength 4/5 to 4+/5 to  allow patient to recip stair climb 5 steps with one rail indep  Progress toward goals:Continued to work on LE strength and ROM working towards LTG #4    POST-PAIN       Pain Rating (0-10 pain scale):   6/10   Location and pain description same as pre-treatment unless indicated. Action: [] NA   [x] Perform HEP  [x] Meds as prescribed  [] Modalities as prescribed   [] Call Physician     Frequency/Duration:  Plan  Times per week: 2  Plan weeks: 4-6  Current Treatment Recommendations: Strengthening,ROM,Neuromuscular Re-education,Home Exercise Program,Modalities,Manual Therapy - Soft Tissue Mobilization     Pt to continue current HEP. See objective section for any therapeutic exercise changes, additions or modifications this date.      PT Individual Minutes  Time In: 0930  Time Out: 1019  Minutes: 49  Timed Code Treatment Minutes: 48 Minutes  Procedure Minutes:n/a      Timed Activity Minutes Units   Ther Ex 39 3   manual 3 0   US 6 0       Signature:  Electronically signed by Joanna Tomas PTA on 2/24/22 at 9:52 AM EST

## 2022-02-24 NOTE — TELEPHONE ENCOUNTER
Two options  Increase ativan to 1 mg and repeat mri  Or do sedation (there is some risk not great that she have a serious complication with sedation)  Let me know of her preference.

## 2022-03-01 ENCOUNTER — APPOINTMENT (OUTPATIENT)
Dept: PHYSICAL THERAPY | Age: 52
End: 2022-03-01
Payer: COMMERCIAL

## 2022-03-01 NOTE — TELEPHONE ENCOUNTER
Clarification  probably safer to do ativan at higher dose not sedation  But if she prefers sedation  I will need to reorder test let me know

## 2022-03-03 ENCOUNTER — HOSPITAL ENCOUNTER (OUTPATIENT)
Dept: PHYSICAL THERAPY | Age: 52
Setting detail: THERAPIES SERIES
Discharge: HOME | End: 2022-03-03
Payer: COMMERCIAL

## 2022-03-03 NOTE — PROGRESS NOTES
Therapy                            Cancellation/No-show Note      Date:  3/3/2022  Patient Name:  Ceasar Quick  :  1970   MRN:  [id number]  Referring Practitioner: Dr. Ludwin Clemens  Diagnosis: Polyarthralgia, OA R knee, B hip pain    Visit Information:  PT Visit Information  Onset Date:  (Pain started in  and progressively worsening and increasing in all joints.)  PT Insurance Information: BCBS  Total # of Visits Approved: 48 (50 visits alexandre yr combined with pt/ot/sp, $30 copay)  Total # of Visits to Date: 4  No Show: 0  Canceled Appointment: 1  Progress Note Counter: 3/12 (3/3/22 cx- PT increasing her pain levels will contact PCP)    For today's appointment patient:  [x]  Cancelled  []  Rescheduled appointment  []  No-show   []  Called pt to remind of next appointment     Reason given by patient:  []  Patient ill  []  Conflicting appointment  []  No transportation    []  Conflict with work  []  No reason given  []  Other:      [x] Pt has future appointments scheduled, no follow up needed  [] Pt requests to be on hold.     Reason:   If > 2 weeks please discuss with therapist.  [] Therapist to call pt for follow up     Comments:  Pt having more pain since she strated PT, will contact her PCP and will contact our office in regards to her next appt scheduled on 3/10/22     Signature: Electronically signed by Farheen Mauro PTA on 3/3/22 at 11:59 AM EST

## 2022-03-10 ENCOUNTER — HOSPITAL ENCOUNTER (OUTPATIENT)
Dept: PHYSICAL THERAPY | Age: 52
Setting detail: THERAPIES SERIES
Discharge: HOME | End: 2022-03-10
Payer: COMMERCIAL

## 2022-03-10 PROCEDURE — 97140 MANUAL THERAPY 1/> REGIONS: CPT

## 2022-03-10 PROCEDURE — 97110 THERAPEUTIC EXERCISES: CPT

## 2022-03-10 ASSESSMENT — PAIN DESCRIPTION - ONSET: ONSET: AWAKENED FROM SLEEP

## 2022-03-10 ASSESSMENT — PAIN DESCRIPTION - ORIENTATION: ORIENTATION: RIGHT

## 2022-03-10 ASSESSMENT — PAIN DESCRIPTION - PAIN TYPE: TYPE: CHRONIC PAIN

## 2022-03-10 ASSESSMENT — PAIN DESCRIPTION - FREQUENCY: FREQUENCY: CONTINUOUS

## 2022-03-10 ASSESSMENT — PAIN SCALES - GENERAL: PAINLEVEL_OUTOF10: 7

## 2022-03-10 ASSESSMENT — PAIN DESCRIPTION - PROGRESSION: CLINICAL_PROGRESSION: NOT CHANGED

## 2022-03-10 NOTE — PROGRESS NOTES
[address] [address]  [address]  PTQSW:[phone]        Date: 3/10/2022  Patient: Oscar Sun  : 1970  ACCT #: [de-identified]  Referring Practitioner: Dr. Saira Thornton  Diagnosis: Polyarthralgia, OA R knee, B hip pain  Treatment Diagnosis: B hip pain, R knee OA    Visit Information:  PT Visit Information  Onset Date:  (Pain started in  and progressively worsening and increasing in all joints.)  PT Insurance Information: BCBS  Total # of Visits Approved: 48 (50 visits alexandre yr combined with pt/ot/sp, $30 copay)  Total # of Visits to Date: 4  No Show: 0  Canceled Appointment: 1  Progress Note Counter:     Subjective: Pt stated that her whole body is in pain. Knee pain, hips, lower back and even her feet. She is just not doing well overall. Scheduled for MRI of the brain on 3/22/22. She had to call off a few times since she has been in d/t increase pain in the listed areas. Pt stated finding it very challenging to work d/t pain and memory loss. Limited with standing, sitting, walking changing positions frequently to self manage or lie down to decrease pain. Comments: RTD with Dr Robert Sosa after MRI. 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 Degenerative changes are seen in their is facet arthropathy which could result in canal stenosis. 2. Degenerative changes are seen in the right knee are mild to moderate 3. Very mild degenerative changes are seen in the hips and pelvis. HEP Compliance:  [] Good [x] Fair [] Poor [] Reports not doing due to:      Pain Assessment  Pain Assessment: 0-10  Pain Level: 7  Pain Type: Chronic pain  Pain Location: Hip;Knee;Back; Ankle  Pain Orientation: Right  Pain Radiating Towards: R knee> L, B hips, B ankles, B shoulders  Pain Descriptors: Burning;Aching;Tiring  Pain Frequency: Continuous  Pain Onset: Awakened from sleep  Clinical Progression: Not changed    OBJECTIVE: Exercises  Exercise 2: ham stretch B 5 x 10\" supine , gastroc stretch*  Exercise 3: piriformis B 2 x 20\" seated  Exercise 4: hip add with ball 10 x 5\"  Exercise 6: ham curl with GTB 10 x 5\"  Exercise 7: hip IR/ER [employer] 10 x 5\" ea  (advance to band when approp.)  Exercise 19: Objective measures taken : MMT- see  manual  Exercise 20: HEP: ham stretch and piriformis. Updated HEP on 2/15/22: supine piriformis, hip add/abd/IR/ER isos, hs curls     Strength: [] NT   [x] MMT completed:  Strength RLE  Comment: Hip flex, Abd 4-5, Ext 4/5, knee 3+ to 4-/5, ankle 3+/5  Strength LLE  Comment: Hip 4/5, knee 4/5, ankle 4/5      ROM: [x] NT  [] ROM measurements:     Modalities:  Modalities  Moist heat: *  Ultrasound: consider R knee medial knee joint x 8mins 1.2 w/cm2  1. mh     *Indicates exercise, modality, or manual techniques to be initiated when appropriate    Assessment: Body structures, Functions, Activity limitations: Decreased functional mobility ,Increased pain,Decreased posture,Decreased ROM,Decreased strength  Assessment: Pt able to complete gentle LE ROM and strengthening in a seated position. Rest breaks require in between exercies d/t increase fatigue with progression of exercises. Lumbar distraction presented to give the pt relief temporarily. US completed to the R knee to help manage pain levels. Discussed with pt on aqua therapy and at this time the pt is unsure d/t sensitivity to the cold. Pt will work on HEP as tolerated until her next appt. Will discuss with Nan Burroughs, leg length discrepancy to evaluate next visit per pt. Treatment Diagnosis: B hip pain, R knee OA  Prognosis: Good     Goals:  Short term goals  Time Frame for Short term goals: 2 weeks  Short term goal 1: The pt will demonstrate improved postural awareness requiring <25% VC's throughout treatment  Short term goal 2: Decrease B hips and R knee pain 50% to assist with improved functional gains.   Short term goal 3: Improve hamstring flexibility -20° @90/90 hip/knee flexion for improved posture awareness    Long term goals  Time Frame for Long term goals : 4 weeks  Long term goal 1: Indep HEP for symptom management  Long term goal 2: Pt demo improved overall function by reporting greater than 50% per functional survey score  Long term goal 3: Pain-free R knee 0-120 deg and R hip abd 25 deg AROM to WNL allowing an increase in ADL tolerance. Long term goal 4: Improve R LE strength 4/5 to 4+/5 to  allow patient to recip stair climb 5 steps with one rail indep  Progress toward goals:Objective measures for MMT taken of the LE's, continued to work towards LTG's. POST-PAIN       Pain Rating (0-10 pain scale):   6-7/10   Location and pain description same as pre-treatment unless indicated. Action: [] NA   [x] Perform HEP  [x] Meds as prescribed  [] Modalities as prescribed   [] Call Physician     Frequency/Duration:   Plan  Times per week: 2  Plan weeks: 4-6  Current Treatment Recommendations: Strengthening,ROM,Neuromuscular Re-education,Home Exercise Program,Modalities,Manual Therapy - Soft Tissue Mobilization     Pt to continue current HEP. See objective section for any therapeutic exercise changes, additions or modifications this date.     PT Individual Minutes  Time In: 1020  Time Out: 1123  Minutes: 63   Timed Code Treatment Minutes: 61 Minutes  Procedure Minutes:n/a     Timed Activity Minutes Units   Ther Ex 45 3   Manual/MMT 8 1   US 8 0       Signature:  Electronically signed by Vick Gil PTA on 3/10/22 at 10:42 AM EST

## 2022-03-15 ENCOUNTER — APPOINTMENT (OUTPATIENT)
Dept: PHYSICAL THERAPY | Age: 52
End: 2022-03-15
Payer: COMMERCIAL

## 2022-03-17 ENCOUNTER — PATIENT MESSAGE (OUTPATIENT)
Dept: FAMILY MEDICINE CLINIC | Age: 52
End: 2022-03-17

## 2022-03-17 NOTE — TELEPHONE ENCOUNTER
From: Sherry Negro  To: Dr. Dover Pleasant: 3/17/2022 9:35 AM EDT  Subject: MRI    I am scheduled for my MRI of my brain on March 22 Having many issues with my back along with all my other pain in my jointsPT suggested MRI of my back Are we able to do this at the same time Since I will be sedated?

## 2022-03-18 NOTE — TELEPHONE ENCOUNTER
I think it's unlikely given insurance will probably want more documentation and pt on that area  Is she referring to her lower back ?   I put in a mri order for her lower back

## 2022-03-22 ENCOUNTER — ANESTHESIA (OUTPATIENT)
Dept: MRI IMAGING | Age: 52
End: 2022-03-22

## 2022-03-22 ENCOUNTER — HOSPITAL ENCOUNTER (OUTPATIENT)
Dept: MRI IMAGING | Age: 52
Discharge: HOME | End: 2022-03-24
Payer: COMMERCIAL

## 2022-03-22 ENCOUNTER — ANESTHESIA EVENT (OUTPATIENT)
Dept: MRI IMAGING | Age: 52
End: 2022-03-22

## 2022-03-22 VITALS
DIASTOLIC BLOOD PRESSURE: 48 MMHG | OXYGEN SATURATION: 98 % | RESPIRATION RATE: 18 BRPM | SYSTOLIC BLOOD PRESSURE: 110 MMHG

## 2022-03-22 VITALS
DIASTOLIC BLOOD PRESSURE: 65 MMHG | HEART RATE: 51 BPM | OXYGEN SATURATION: 98 % | TEMPERATURE: 98.4 F | SYSTOLIC BLOOD PRESSURE: 137 MMHG | RESPIRATION RATE: 18 BRPM

## 2022-03-22 DIAGNOSIS — R41.3 MEMORY LOSS: ICD-10-CM

## 2022-03-22 DIAGNOSIS — R42 DIZZINESS: ICD-10-CM

## 2022-03-22 PROCEDURE — 7100000010 HC PHASE II RECOVERY - FIRST 15 MIN

## 2022-03-22 PROCEDURE — 3700000000 HC ANESTHESIA ATTENDED CARE

## 2022-03-22 PROCEDURE — 70551 MRI BRAIN STEM W/O DYE: CPT

## 2022-03-22 PROCEDURE — 2580000003 HC RX 258: Performed by: NURSE ANESTHETIST, CERTIFIED REGISTERED

## 2022-03-22 PROCEDURE — 3700000001 HC ADD 15 MINUTES (ANESTHESIA)

## 2022-03-22 PROCEDURE — 2500000003 HC RX 250 WO HCPCS: Performed by: NURSE ANESTHETIST, CERTIFIED REGISTERED

## 2022-03-22 PROCEDURE — 7100000011 HC PHASE II RECOVERY - ADDTL 15 MIN

## 2022-03-22 RX ORDER — PROPOFOL 10 MG/ML
INJECTION, EMULSION INTRAVENOUS CONTINUOUS PRN
Status: DISCONTINUED | OUTPATIENT
Start: 2022-03-22 | End: 2022-03-22 | Stop reason: SDUPTHER

## 2022-03-22 RX ORDER — SODIUM CHLORIDE, SODIUM LACTATE, POTASSIUM CHLORIDE, CALCIUM CHLORIDE 600; 310; 30; 20 MG/100ML; MG/100ML; MG/100ML; MG/100ML
INJECTION, SOLUTION INTRAVENOUS
Status: COMPLETED
Start: 2022-03-22 | End: 2022-03-22

## 2022-03-22 RX ORDER — SODIUM CHLORIDE, SODIUM LACTATE, POTASSIUM CHLORIDE, CALCIUM CHLORIDE 600; 310; 30; 20 MG/100ML; MG/100ML; MG/100ML; MG/100ML
INJECTION, SOLUTION INTRAVENOUS CONTINUOUS PRN
Status: DISCONTINUED | OUTPATIENT
Start: 2022-03-22 | End: 2022-03-22 | Stop reason: SDUPTHER

## 2022-03-22 RX ORDER — LIDOCAINE HYDROCHLORIDE 20 MG/ML
INJECTION, SOLUTION INFILTRATION; PERINEURAL PRN
Status: DISCONTINUED | OUTPATIENT
Start: 2022-03-22 | End: 2022-03-22 | Stop reason: SDUPTHER

## 2022-03-22 RX ADMIN — LIDOCAINE HYDROCHLORIDE 50 MG: 20 INJECTION, SOLUTION INFILTRATION; PERINEURAL at 14:10

## 2022-03-22 RX ADMIN — PROPOFOL 130 MCG/KG/MIN: 10 INJECTION, EMULSION INTRAVENOUS at 14:10

## 2022-03-22 RX ADMIN — SODIUM CHLORIDE, POTASSIUM CHLORIDE, SODIUM LACTATE AND CALCIUM CHLORIDE: 600; 310; 30; 20 INJECTION, SOLUTION INTRAVENOUS at 13:20

## 2022-03-22 NOTE — ANESTHESIA PRE PROCEDURE
Department of Anesthesiology  Preprocedure Note       Name:  Jaguar Sánchez   Age:  45 y.o.  :  1970                                          MRN:  [id number]         Date:  3/22/2022      Surgeon: * No surgeons listed *    Procedure: * No procedures listed *    Medications prior to admission:   Prior to Admission medications    Medication Sig Start Date End Date Taking? Authorizing Provider   omeprazole (PRILOSEC) 20 MG delayed release capsule Take 20 mg by mouth daily    Historical Provider, MD   nitrofurantoin (MACRODANTIN) 50 MG capsule Oral: 50 mg as a single dose taken within 2 hours of sexual intercourse 22   Martha Orantes MD   levothyroxine (SYNTHROID) 100 MCG tablet Take 1 tablet by mouth daily 22   Martha Orantes MD   levocetirizine (XYZAL) 5 MG tablet Take 1 tablet by mouth nightly 22   Martha Orantes MD   fluticasone (FLONASE) 50 MCG/ACT nasal spray 1 spray by Each Nostril route daily as needed     Historical Provider, MD   aspirin 81 MG tablet Take 81 mg by mouth Takes 2 tablets daily    Historical Provider, MD       Current medications:    Current Outpatient Medications   Medication Sig Dispense Refill    omeprazole (PRILOSEC) 20 MG delayed release capsule Take 20 mg by mouth daily      nitrofurantoin (MACRODANTIN) 50 MG capsule Oral: 50 mg as a single dose taken within 2 hours of sexual intercourse 90 capsule 1    levothyroxine (SYNTHROID) 100 MCG tablet Take 1 tablet by mouth daily 90 tablet 3    levocetirizine (XYZAL) 5 MG tablet Take 1 tablet by mouth nightly 30 tablet 0    fluticasone (FLONASE) 50 MCG/ACT nasal spray 1 spray by Each Nostril route daily as needed       aspirin 81 MG tablet Take 81 mg by mouth Takes 2 tablets daily       Current Facility-Administered Medications   Medication Dose Route Frequency Provider Last Rate Last Admin    lactated ringers infusion                Allergies:     Allergies   Allergen Reactions    Amoxicillin Hives    Nsaids Other (See Comments)     STROKE/NO NSAIDS ALLOWED    Pcn [Penicillins] Rash       Problem List:    Patient Active Problem List   Diagnosis Code    Full incontinence of feces R15.9    Cerebrovascular accident (CVA) ([facility] [address] I63.9    Depression with anxiety F41.8    Hypothyroidism E03.9    Iron deficiency anemia D50.9    Prediabetes R73.03    Right hip impingement syndrome M25.851    Rosacea L71.9    Spondylosis of lumbar region without myelopathy or radiculopathy M47.816    Transient global amnesia 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 x10 yrs    COLONOSCOPY  2019    KERRY AMADO MD    DILATION AND CURETTAGE      ENDOMETRIAL ABLATION      TONSILLECTOMY         Social History:    Social History     Tobacco Use    Smoking status: Former Smoker     Packs/day: 0.25     Years: 3.00     Pack years: 0.75     Types: Cigarettes     Start date: 1988     Quit date: 1990     Years since quittin.7    Smokeless tobacco: Never Used   Substance Use Topics    Alcohol use: Yes     Comment: ocassionally                                Counseling given: Not Answered      Vital Signs (Current):   Vitals:    22 1256   BP: (!) 141/66   Pulse: 55   Resp: 18   Temp: 98.2 °F (36.8 °C)   TempSrc: Temporal   SpO2: 97%                                              BP Readings from Last 3 Encounters:   22 (!) 141/66   22 108/70   22 118/64       NPO Status: Time of last liquid consumption:  (sips with meds this am)                        Time of last solid consumption:                         Date of last liquid consumption: 03/21/22                        Date of last solid food consumption: 03/21/22    BMI:   Wt Readings from Last 3 Encounters:   01/27/22 147 lb (66.7 kg)   01/04/22 144 lb (65.3 kg)   11/09/21 140 lb (63.5 kg)     There is no height or weight on file to calculate BMI.    CBC:   Lab Results   Component Value Date    WBC 7.6 08/10/2021    RBC 4.65 08/10/2021    HGB 14.4 08/10/2021    HCT 43.5 08/10/2021    MCV 93.6 08/10/2021    RDW 14.1 08/10/2021     08/10/2021       CMP:   Lab Results   Component Value Date     08/26/2021    K 4.3 08/26/2021     08/26/2021    CO2 29 08/26/2021    BUN 11 08/26/2021    CREATININE 0.69 08/26/2021    GFRAA >60.0 08/26/2021    LABGLOM >60.0 08/26/2021    GLUCOSE 90 08/26/2021    PROT 7.7 08/26/2021    CALCIUM 9.9 08/26/2021    BILITOT 0.4 08/26/2021    ALKPHOS 65 08/26/2021    AST 18 08/26/2021    ALT 12 08/26/2021       POC Tests: No results for input(s): POCGLU, POCNA, POCK, POCCL, POCBUN, POCHEMO, POCHCT in the last 72 hours.     Coags:   Lab Results   Component Value Date    PROTIME 10.0 06/20/2016    INR 0.9 06/20/2016    APTT 23.8 06/20/2016       HCG (If Applicable): No results found for: PREGTESTUR, PREGSERUM, HCG, HCGQUANT     ABGs: No results found for: PHART, PO2ART, NIA7RDB, CIW9QAI, BEART, R3JVVEYI     Type & Screen (If Applicable):  No results found for: LABABO, LABRH    Drug/Infectious Status (If Applicable):  No results found for: HIV, HEPCAB    COVID-19 Screening (If Applicable):   Lab Results   Component Value Date    COVID19 Not Detected 09/17/2021           Anesthesia Evaluation    Airway: Mallampati: II  TM distance: >3 FB   Neck ROM: full  Mouth opening: > = 3 FB Dental: normal exam         Pulmonary:Negative Pulmonary ROS breath sounds clear to auscultation                             Cardiovascular:Negative CV ROS            Rhythm: regular                      Neuro/Psych:   (+) CVA: no interval change, psychiatric history:            GI/Hepatic/Renal:   (+) GERD: well controlled,           Endo/Other:    (+) hypothyroidism::., .                 Abdominal:             Vascular: negative vascular ROS. Other Findings:             Anesthesia Plan      MAC     ASA 3       Induction: intravenous. Anesthetic plan and risks discussed with patient. Plan discussed with CRNA.     Attending anesthesiologist reviewed and agrees with Preprocedure content              Chava Stevenson MD   3/22/2022

## 2022-03-22 NOTE — ANESTHESIA POSTPROCEDURE EVALUATION
Department of Anesthesiology  Postprocedure Note    Patient: Fernanda Zuleta  MRN: [id number]  YOB: 1970  Date of evaluation: 3/22/2022  Time:  3:00 PM     Procedure Summary     Date: 03/22/22 Room / Location: [facility]    Anesthesia Start: 1400 Anesthesia Stop: 1500    Procedure: MRI BRAIN WO CONTRAST Diagnosis:       Memory loss      Dizziness    Scheduled Providers:  Responsible Provider: TANIYA Coy Caro, CRNA    Anesthesia Type: MAC ASA Status: 3          Anesthesia Type: MAC    Christophe Phase I:      Christophe Phase II:      Last vitals: Reviewed and per EMR flowsheets.        Anesthesia Post Evaluation    Patient location during evaluation: PACU  Patient participation: complete - patient participated  Level of consciousness: awake and alert  Pain score: 1  Airway patency: patent  Nausea & Vomiting: no nausea and no vomiting  Complications: no  Cardiovascular status: hemodynamically stable  Respiratory status: acceptable and nasal cannula  Hydration status: euvolemic  Comments: Report to RN, normal sinus rhythm

## 2022-03-24 ENCOUNTER — HOSPITAL ENCOUNTER (OUTPATIENT)
Dept: PHYSICAL THERAPY | Age: 52
Setting detail: THERAPIES SERIES
Discharge: HOME | End: 2022-03-24
Payer: COMMERCIAL

## 2022-03-24 PROCEDURE — 97110 THERAPEUTIC EXERCISES: CPT

## 2022-03-24 ASSESSMENT — PAIN DESCRIPTION - PAIN TYPE: TYPE: CHRONIC PAIN

## 2022-03-24 ASSESSMENT — PAIN DESCRIPTION - FREQUENCY: FREQUENCY: CONTINUOUS

## 2022-03-24 ASSESSMENT — PAIN DESCRIPTION - ONSET: ONSET: AWAKENED FROM SLEEP

## 2022-03-24 ASSESSMENT — PAIN DESCRIPTION - PROGRESSION: CLINICAL_PROGRESSION: NOT CHANGED

## 2022-03-24 ASSESSMENT — PAIN SCALES - GENERAL: PAINLEVEL_OUTOF10: 8

## 2022-03-24 ASSESSMENT — PAIN DESCRIPTION - LOCATION: LOCATION: HIP;KNEE;ANKLE;BACK

## 2022-03-24 ASSESSMENT — PAIN DESCRIPTION - ORIENTATION: ORIENTATION: RIGHT

## 2022-03-24 NOTE — PROGRESS NOTES
[address] [address]  [facility]  ECMJC:[phone]        Date: 3/24/2022  Patient: Gordon Estrada  : 1970  ACCT #: [de-identified]  Referring Practitioner: Dr. Jose Roberto Hollingsworth  Diagnosis: Polyarthralgia, OA R knee, B hip pain  Treatment Diagnosis: B hip pain, R knee OA    Visit Information:  PT Visit Information  Onset Date:  (Pain started in  and progressively worsening and increasing in all joints.)  PT Insurance Information: BCBS  Total # of Visits Approved: 48 (50 visits alexandre yr combined with pt/ot/sp, $30 copay)  Total # of Visits to Date: 5  No Show: 0  Canceled Appointment: 1  Progress Note Counter:     Subjective: Pt stated that her pain is everywhere. Stated she feels she is off and walks staggered and will use walls to keep from falling. Stated feels the left leg is shorter then the right. She had her brain MRI last week and will f/u with physician to discuss those results. Waiting to see if MRI of the lubmar was approved. Comments: RTD with Dr Bela Guy after MRI. 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 Degenerative changes are seen in their is facet arthropathy which could result in canal stenosis. 2. Degenerative changes are seen in the right knee are mild to moderate 3. Very mild degenerative changes are seen in the hips and pelvis. HEP Compliance:  [] Good [x] Fair [] Poor [] Reports not doing due to:    Vital Signs  Patient Currently in Pain: Yes   Pain Screening  Patient Currently in Pain: Yes  Pain Assessment  Pain Assessment: 0-10  Pain Level: 8  Pain Type: Chronic pain  Pain Location: Hip;Knee; Ankle;Back  Pain Orientation: Right  Pain Descriptors: Burning;Aching;Tiring  Pain Frequency: Continuous  Pain Onset: Awakened from sleep  Clinical Progression: Not changed    OBJECTIVE:   Exercises  Exercise 2: ham stretch B 5 x 10\" supine , gastroc stretch*  Exercise 3: piriformis B 2 x 20\" seated  Exercise 19: see manual  Exercise 20: HEP: ham stretch and piriformis. Updated HEP on 2/15/22: supine piriformis, hip add/abd/IR/ER isos, hs curls   Strength: [x] NT  [] MMT completed:     ROM: [x] NT  [] ROM measurements:       Manual:   Manual therapy  PROM: B LE's*  Manual traction: B LE distraction 10 x 15\" (5 min)      *Indicates exercise, modality, or manual techniques to be initiated when appropriate    Assessment: Body structures, Functions, Activity limitations: Decreased functional mobility ,Increased pain,Decreased posture,Decreased ROM,Decreased strength  Assessment: Pt attempt to complete active exercises seated this visit. Increase pain noted in her low back and up her back. Pt unable to progress and tolerated lumbar gentle distraction. will progress as tolerated next visit to improve strength and rom to reach LTG's  Treatment Diagnosis: B hip pain, R knee OA  Prognosis: Good     Goals:  Short term goals  Time Frame for Short term goals: 2 weeks  Short term goal 1: The pt will demonstrate improved postural awareness requiring <25% VC's throughout treatment  Short term goal 2: Decrease B hips and R knee pain 50% to assist with improved functional gains. Short term goal 3: Improve hamstring flexibility -20° @90/90 hip/knee flexion for improved posture awareness    Long term goals  Time Frame for Long term goals : 4 weeks  Long term goal 1: Indep HEP for symptom management  Long term goal 2: Pt demo improved overall function by reporting greater than 50% per functional survey score  Long term goal 3: Pain-free R knee 0-120 deg and R hip abd 25 deg AROM to WNL allowing an increase in ADL tolerance. Long term goal 4: Improve R LE strength 4/5 to 4+/5 to  allow patient to recip stair climb 5 steps with one rail indep  Progress toward goals:Continued to work on LE strength to reach LTG #4.      POST-PAIN       Pain Rating (0-10 pain scale):   8/10   Location and pain description same as pre-treatment unless indicated. Action: [] NA   [x] Perform HEP  [x] Meds as prescribed  [] Modalities as prescribed   [] Call Physician     Frequency/Duration:  Plan  Times per week: 2  Plan weeks: 4-6  Current Treatment Recommendations: Strengthening,ROM,Neuromuscular Re-education,Home Exercise Program,Modalities,Manual Therapy - Soft Tissue Mobilization     Pt to continue current HEP. See objective section for any therapeutic exercise changes, additions or modifications this date.     PT Individual Minutes  Time In: 0930  Time Out: 1005  Minutes: 35  Timed Code Treatment Minutes: 30 Minutes     Timed Activity Minutes Units   Ther Ex 25 2   Manual  5 0       Signature:  Electronically signed by Roger Alonso PTA on 3/24/22 at 5:39 PM EDT

## 2022-03-29 ENCOUNTER — HOSPITAL ENCOUNTER (OUTPATIENT)
Dept: PHYSICAL THERAPY | Age: 52
Setting detail: THERAPIES SERIES
Discharge: HOME | End: 2022-03-29
Payer: COMMERCIAL

## 2022-03-29 NOTE — PROGRESS NOTES
Therapy                            Cancellation/No-show Note      Date:  3/29/2022  Patient Name:  Rakesh Meng  :  1970   MRN:  [id number]  Referring Practitioner: Dr. Gerald Harrington  Diagnosis: Polyarthralgia, OA R knee, B hip pain    Visit Information:  PT Visit Information  Onset Date:  (Pain started in  and progressively worsening and increasing in all joints.)  PT Insurance Information: BCBS  Total # of Visits Approved: 48 (50 visits alexandre yr combined with pt/ot/sp, $30 copay)  Total # of Visits to Date: 5  No Show: 0  Canceled Appointment: 2  Progress Note Counter:         (  cx appt conflict- would like to be placed on hold for 2 weeks)    For today's appointment patient:  [x]  Cancelled  []  Rescheduled appointment  []  No-show   []  Called pt to remind of next appointment     Reason given by patient:  []  Patient ill  [x]  Conflicting appointment  []  No transportation    []  Conflict with work  []  No reason given  []  Other:      [] Pt has future appointments scheduled, no follow up needed  [] Pt requests to be on hold. Reason:   If > 2 weeks please discuss with therapist.  [] Therapist to call pt for follow up     Comments:   Pt request to be on hold for 2 weeks.    Signature: Electronically signed by Corrine Corona PTA on 3/29/22 at 9:38 AM EDT

## 2022-03-31 ENCOUNTER — OFFICE VISIT (OUTPATIENT)
Dept: FAMILY MEDICINE CLINIC | Age: 52
End: 2022-03-31
Payer: COMMERCIAL

## 2022-03-31 ENCOUNTER — TELEPHONE (OUTPATIENT)
Dept: FAMILY MEDICINE CLINIC | Age: 52
End: 2022-03-31

## 2022-03-31 VITALS
HEART RATE: 73 BPM | WEIGHT: 144 LBS | SYSTOLIC BLOOD PRESSURE: 130 MMHG | HEIGHT: 66 IN | DIASTOLIC BLOOD PRESSURE: 72 MMHG | BODY MASS INDEX: 23.14 KG/M2 | TEMPERATURE: 97.6 F | OXYGEN SATURATION: 98 %

## 2022-03-31 DIAGNOSIS — R41.3 TRANSIENT MEMORY LOSS: ICD-10-CM

## 2022-03-31 DIAGNOSIS — E03.9 HYPOTHYROIDISM, UNSPECIFIED TYPE: ICD-10-CM

## 2022-03-31 DIAGNOSIS — M19.90 OSTEOARTHRITIS, UNSPECIFIED OSTEOARTHRITIS TYPE, UNSPECIFIED SITE: ICD-10-CM

## 2022-03-31 DIAGNOSIS — M79.7 FIBROMYALGIA: Primary | ICD-10-CM

## 2022-03-31 PROCEDURE — 99215 OFFICE O/P EST HI 40 MIN: CPT | Performed by: INTERNAL MEDICINE

## 2022-03-31 RX ORDER — LEVOTHYROXINE SODIUM 0.1 MG/1
100 TABLET ORAL DAILY
Qty: 90 TABLET | Refills: 3 | Status: SHIPPED | OUTPATIENT
Start: 2022-03-31

## 2022-03-31 RX ORDER — HYDROXYZINE PAMOATE 50 MG/1
CAPSULE ORAL
COMMUNITY
Start: 2022-01-29 | End: 2022-03-31 | Stop reason: CLARIF

## 2022-03-31 RX ORDER — ACETAMINOPHEN 500 MG
500 TABLET ORAL EVERY 6 HOURS PRN
COMMUNITY
End: 2022-05-11 | Stop reason: CLARIF

## 2022-03-31 ASSESSMENT — ENCOUNTER SYMPTOMS
SHORTNESS OF BREATH: 0
BACK PAIN: 0
ABDOMINAL PAIN: 0
EYE PAIN: 0

## 2022-03-31 NOTE — PROGRESS NOTES
Subjective:      Patient ID: Isabel Maya is a 45 y.o. female who presents today with:  Chief Complaint   Patient presents with   [address]     patient is here to go over labs.  Other     patient is having body pain and fatigue. HPI      Here for follow up   Past Medical History:   Diagnosis Date    Abnormal Pap smear of cervix     Depression     Depression with anxiety     Depression with anxiety     Iron deficiency anemia 2019    Stroke ([address] 2016    Idiopathic she is still in process     Thyroid disease      Past Surgical History:   Procedure Laterality Date    BLADDER SURGERY      done by Dr. THOMAS [facility] x10 yrs    COLONOSCOPY  2019    KERRY AMADO MD    DILATION AND CURETTAGE      ENDOMETRIAL ABLATION      TONSILLECTOMY       Social History     Socioeconomic History    Marital status:      Spouse name: Not on file    Number of children: Not on file    Years of education: Not on file    Highest education level: Not on file   Occupational History    Not on file   Tobacco Use    Smoking status: Former Smoker     Packs/day: 0.25     Years: 3.00     Pack years: 0.75     Types: Cigarettes     Start date: 1988     Quit date: 1990     Years since quittin.7    Smokeless tobacco: Never Used   Vaping Use    Vaping Use: Never used   Substance and Sexual Activity    Alcohol use: Yes     Comment: ocassionally    Drug use: No    Sexual activity: Not Currently   Other Topics Concern    Not on file   Social History Narrative    Not on file     Social Determinants of Health     Financial Resource Strain: Low Risk     Difficulty of Paying Living Expenses: Not hard at all   Food Insecurity: No Food Insecurity    Worried About [address] in the Last Year: Never true    [address] in the Last Year: Never true   Transportation Needs: No Transportation Needs    Lack of Transportation (Medical): No    Lack of Transportation (Non-Medical):  No Physical Activity:     Days of Exercise per Week: Not on file    Minutes of Exercise per Session: Not on file   Stress:     Feeling of Stress : Not on file   Social Connections:     Frequency of Communication with Friends and Family: Not on file    Frequency of Social Gatherings with Friends and Family: Not on file    Attends [employer] Services: Not on file    Active Member of [address] [place] [address] or Organizations: Not on file    Attends Club or Organization Meetings: Not on file    Marital Status: Not on file   Intimate Partner Violence:     Fear of Current or Ex-Partner: Not on file    Emotionally Abused: Not on file    Physically Abused: Not on file    Sexually Abused: Not on file   Housing Stability:     Unable to Pay for Housing in the Last Year: Not on file    Number of [place] in the Last Year: Not on file    Unstable Housing in the Last Year: Not on file     Allergies   Allergen Reactions    Amoxicillin Hives    Nsaids Other (See Comments)     STROKE/NO NSAIDS ALLOWED    Pcn [Penicillins] Rash     Current Outpatient Medications on File Prior to Visit   Medication Sig Dispense Refill    acetaminophen (TYLENOL) 500 MG tablet Take 500 mg by mouth every 6 hours as needed for Pain      nitrofurantoin (MACRODANTIN) 50 MG capsule Oral: 50 mg as a single dose taken within 2 hours of sexual intercourse 90 capsule 1    levocetirizine (XYZAL) 5 MG tablet Take 1 tablet by mouth nightly 30 tablet 0    fluticasone (FLONASE) 50 MCG/ACT nasal spray 1 spray by Each Nostril route daily as needed       aspirin 81 MG tablet Take 81 mg by mouth Takes 2 tablets daily       No current facility-administered medications on file prior to visit. I have personally reviewed the ROS, PMH, PFH, and social history     Review of Systems   Constitutional: Negative for chills and fever. HENT: Negative for congestion. Eyes: Negative for pain. Respiratory: Negative for shortness of breath.     Cardiovascular: levothyroxine (SYNTHROID) 100 MCG tablet   4. Osteoarthritis, unspecified osteoarthritis type, unspecified site           Plan:       vc  Saw  Rheum (X2)   We'll talk about covid booster when due   Keep neuro appt   Mri lumbar spine not done yet  [facility] 08/2022   See rheum and if no clear cause see ortho. (wants to hold off)   Wants to hold off on ssri/snri  F/U with ob/gyn  Thyroid US in 12/2023, if stable, no further fu   She will fu with ophatology as well. Orders Placed This Encounter   Procedures    US CAROTID ARTERY BILATERAL     Standing Status:   Future     Standing Expiration Date:   3/31/2023    Lyme Disease Chronic Reflexive Panel     Standing Status:   Future     Standing Expiration Date:   3/31/2023    Rpr     Standing Status:   Future     Standing Expiration Date:   3/31/2023     Orders Placed This Encounter   Medications    levothyroxine (SYNTHROID) 100 MCG tablet     Sig: Take 1 tablet by mouth daily     Dispense:  90 tablet     Refill:  3   Doesn't want flu shot   No si/hi  Macrobid frquent uti (post coital)  If new or worsening symptoms call 911   No driving, no baths,no climbing laddres. 43 minutes spent with patient. If anything should change or worsen call ASAP, don't wait for next scheduled appointment. No follow-ups on file.       Michael Benson MD

## 2022-04-01 LAB — RPR: NORMAL

## 2022-04-03 LAB — LYME, EIA: 0.11 LIV (ref 0–1.2)

## 2022-04-12 ENCOUNTER — HOSPITAL ENCOUNTER (OUTPATIENT)
Dept: ULTRASOUND IMAGING | Age: 52
Discharge: HOME | End: 2022-04-14
Payer: COMMERCIAL

## 2022-04-12 DIAGNOSIS — R41.3 TRANSIENT MEMORY LOSS: ICD-10-CM

## 2022-04-12 PROCEDURE — 93880 EXTRACRANIAL BILAT STUDY: CPT

## 2022-04-14 ENCOUNTER — PATIENT MESSAGE (OUTPATIENT)
Dept: FAMILY MEDICINE CLINIC | Age: 52
End: 2022-04-14

## 2022-04-15 NOTE — TELEPHONE ENCOUNTER
I'm not aware of any open mri or less clautrophobic mri  If so, please correct me  Ct could be used but doesn't provide as good of detail

## 2022-04-15 NOTE — TELEPHONE ENCOUNTER
From: Zechariah Fatima  To: Dr. Francis Lean: 4/14/2022 5:43 PM EDT  Subject: MRI    There is an order for a MRI of my lower back. I just had an MRI of my brain under IV sedation is there another test in place of MRI so I can avoid being sedated again? Unless this MRI is not as Claustrophobic?  Thank you

## 2022-04-26 ENCOUNTER — OFFICE VISIT (OUTPATIENT)
Dept: NEUROLOGY | Age: 52
End: 2022-04-26
Payer: COMMERCIAL

## 2022-04-26 VITALS — DIASTOLIC BLOOD PRESSURE: 76 MMHG | SYSTOLIC BLOOD PRESSURE: 122 MMHG | HEART RATE: 70 BPM

## 2022-04-26 DIAGNOSIS — R41.89 BRAIN FOG: ICD-10-CM

## 2022-04-26 DIAGNOSIS — R41.840 ATTENTION DEFICIT: ICD-10-CM

## 2022-04-26 DIAGNOSIS — M79.7 FIBROMYALGIA: ICD-10-CM

## 2022-04-26 DIAGNOSIS — R41.3 MEMORY LOSS: Primary | ICD-10-CM

## 2022-04-26 DIAGNOSIS — I63.9 CEREBROVASCULAR ACCIDENT (CVA), UNSPECIFIED MECHANISM (HCC): ICD-10-CM

## 2022-04-26 DIAGNOSIS — F41.8 DEPRESSION WITH ANXIETY: ICD-10-CM

## 2022-04-26 PROCEDURE — 99204 OFFICE O/P NEW MOD 45 MIN: CPT | Performed by: PSYCHIATRY & NEUROLOGY

## 2022-04-26 ASSESSMENT — ENCOUNTER SYMPTOMS
SHORTNESS OF BREATH: 0
BACK PAIN: 0
COLOR CHANGE: 0
TROUBLE SWALLOWING: 0
NAUSEA: 0
VOMITING: 0
PHOTOPHOBIA: 0
CHOKING: 0

## 2022-04-26 NOTE — PROGRESS NOTES
Subjective:      Patient ID: Casandra Beatty is a 45 y.o. female who presents today for:  Chief Complaint   Patient presents with    New Patient     Pt says that she has been having memory issues and had a few times she has gotten lost. She says that she was also diagnosed with fibromyalgia, and says her pcp thinks she could have what they call fiber fog going on. Onset of the memory issue has been since about December. She says that she taeks x rays for her job, and even forgot how to take them, she says that she also has some issue with word finding as well. HPI 45 right-handed female with history of memory issues. Patient feels in a found. This has been going on for some time and she has access evaluations and MRI of the brain was reviewed is entirely normal.  Has fibromyalgia and reports that she was told that she has a fibro fog . 2016 she reported she had a stroke. Her symptoms are reported to me that she was crying and confused and was seen at [facility] though I do not have any records in the [facility] system in 2016 regarding a stroke seen on MRI and she was seen by Dr. Rabia Lewis at that time. Multiple did mention a suspected acute ischemia involving the posterior hippocampal region a follow-up MRI but just recently does not show any residuals of the same. Patient has fibromyalgia and has been seen by multiple specialist.  She has also been seen by multiple neurologist but she could not give me any details of what work-ups ordered and what was followed after the stroke. She continues on aspirin. She does not sleep well due to the pain she has. She has been tried on medications such as Cymbalta for fibromyalgia and she had side effects and she did not respond to many of the antidepressants. She does have history suggestive  of major depression.   Patient has not any previous history of seizures head injury trauma        Past Medical History:   Diagnosis Date    Abnormal Pap smear of cervix     Depression     Depression with anxiety     Depression with anxiety     Iron deficiency anemia 2019    Stroke ([address] 2016    Idiopathic she is still in process     Thyroid disease      Past Surgical History:   Procedure Laterality Date    BLADDER SURGERY      done by Dr[facility] x10 yrs    COLONOSCOPY  2019    KERRY AMADO MD    DILATION AND CURETTAGE      ENDOMETRIAL ABLATION      TONSILLECTOMY       Social History     Socioeconomic History    Marital status:      Spouse name: Not on file    Number of children: Not on file    Years of education: Not on file    Highest education level: Not on file   Occupational History    Not on file   Tobacco Use    Smoking status: Former Smoker     Packs/day: 0.25     Years: 3.00     Pack years: 0.75     Types: Cigarettes     Start date: 1988     Quit date: 1990     Years since quittin.8    Smokeless tobacco: Never Used   Vaping Use    Vaping Use: Never used   Substance and Sexual Activity    Alcohol use: Yes     Comment: ocassionally    Drug use: No    Sexual activity: Not Currently   Other Topics Concern    Not on file   Social History Narrative    Not on file     Social Determinants of Health     Financial Resource Strain:     Difficulty of Paying Living Expenses: Not on file   Food Insecurity:     Worried About [address] in the Last Year: Not on file    [address] in the Last Year: Not on file   Transportation Needs:     Lack of Transportation (Medical): Not on file    Lack of Transportation (Non-Medical):  Not on file   Physical Activity:     Days of Exercise per Week: Not on file    Minutes of Exercise per Session: Not on file   Stress:     Feeling of Stress : Not on file   Social Connections:     Frequency of Communication with Friends and Family: Not on file    Frequency of Social Gatherings with Friends and Family: Not on file    Attends Congregation Services: Not on file   Ardyth Moritz Active Member of Clubs or Organizations: Not on file    Attends Club or Organization Meetings: Not on file    Marital Status: Not on file   Intimate Partner Violence:     Fear of Current or Ex-Partner: Not on file    Emotionally Abused: Not on file    Physically Abused: Not on file    Sexually Abused: Not on file   Housing Stability:     Unable to Pay for Housing in the Last Year: Not on file    Number of [place] in the Last Year: Not on file    Unstable Housing in the Last Year: Not on file     No family history on file. Allergies   Allergen Reactions    Amoxicillin Hives    Nsaids Other (See Comments)     STROKE/NO NSAIDS ALLOWED    Pcn [Penicillins] Rash       Current Outpatient Medications   Medication Sig Dispense Refill    levothyroxine (SYNTHROID) 100 MCG tablet Take 1 tablet by mouth daily 90 tablet 3    acetaminophen (TYLENOL) 500 MG tablet Take 500 mg by mouth every 6 hours as needed for Pain      nitrofurantoin (MACRODANTIN) 50 MG capsule Oral: 50 mg as a single dose taken within 2 hours of sexual intercourse 90 capsule 1    fluticasone (FLONASE) 50 MCG/ACT nasal spray 1 spray by Each Nostril route daily as needed       aspirin 81 MG tablet Take 81 mg by mouth Takes 2 tablets daily       No current facility-administered medications for this visit. Review of Systems   Constitutional: Negative for fever. HENT: Negative for ear pain, tinnitus and trouble swallowing. Eyes: Negative for photophobia and visual disturbance. Respiratory: Negative for choking and shortness of breath. Cardiovascular: Negative for chest pain and palpitations. Gastrointestinal: Negative for nausea and vomiting. Musculoskeletal: Negative for back pain, gait problem, joint swelling, myalgias, neck pain and neck stiffness. Skin: Negative for color change. Allergic/Immunologic: Negative for food allergies.    Neurological: Negative for dizziness, tremors, seizures, syncope, facial asymmetry, speech difficulty, weakness, light-headedness, numbness and headaches. Psychiatric/Behavioral: Positive for decreased concentration. Negative for behavioral problems, confusion, hallucinations and sleep disturbance. Objective:   /76 (Site: Left Upper Arm, Position: Sitting, Cuff Size: Medium Adult)   Pulse 70   LMP 03/18/2019 (Approximate)     Physical Exam  Vitals reviewed. Eyes:      Pupils: Pupils are equal, round, and reactive to light. Cardiovascular:      Rate and Rhythm: Normal rate and regular rhythm. Heart sounds: No murmur heard. Pulmonary:      Effort: Pulmonary effort is normal.      Breath sounds: Normal breath sounds. Abdominal:      General: Bowel sounds are normal.   Musculoskeletal:         General: Normal range of motion. Cervical back: Normal range of motion. Skin:     General: Skin is warm. Neurological:      Mental Status: She is alert and oriented to person, place, and time. Cranial Nerves: No cranial nerve deficit. Sensory: No sensory deficit. Motor: No abnormal muscle tone. Coordination: Coordination normal.      Deep Tendon Reflexes: Reflexes are normal and symmetric. Babinski sign absent on the right side. Babinski sign absent on the left side. Psychiatric:         Mood and Affect: Mood normal.         US CAROTID ARTERY BILATERAL    Result Date: 4/12/2022  EXAMINATION: US CAROTID ARTERY BILATERAL HISTORY:  Headaches. Former smoker. Memory loss. History of CVA. COMPARISON: Carotid ultrasound from 5/28/2016. TECHNIQUE: Carotid duplex sonograms which include gray scale and color flow evaluation are complimented with spectral waveform analysis. Please refer to chart below for specific carotid velocity measurements. The degree of stenosis recorded on this exam uses the same method of stratification used in the NASCET trials.  This complies with ACR practice guidelines and the Society of radiology in ultrasound consensus statement and Component Value Date    PROTIME 10.0 06/20/2016    INR 0.9 06/20/2016     Lab Results   Component Value Date    TSH 0.677 04/05/2021    HBLTXSVL45 607 05/29/2016    FERRITIN 112.5 04/12/2019    IRON 100 04/12/2019    TIBC 314 04/12/2019     Lab Results   Component Value Date    TRIG 61 05/29/2020     08/26/2021    LDLCALC 111 08/26/2021     Lab Results   Component Value Date    LABAMPH Neg 06/20/2016    BARBSCNU Neg 06/20/2016    LABBENZ Neg 06/20/2016    OPIATESCREENURINE Neg 06/20/2016    PHENCYCLIDINESCREENURINE Neg 06/20/2016    ETOH <10 05/27/2016     No results found for: LITHIUM, DILFRTOT, VALPROATE    Assessment:       Diagnosis Orders   1. Memory loss  EEG   2. Depression with anxiety  Amb External Referral To Psychology   3. Cerebrovascular accident (CVA), unspecified mechanism ([facility] [address]     4. Brain fog     5. Fibromyalgia     6. Attention deficit     Memory loss with features of attention deficit. Patient relates Multiple examples of difficulty with attention in the area of work  being done on a daily basis she has forgetfulness. She functions very well otherwise. Patient also has Sequent sleep issues and sleep deprived patient secondary to fibromyalgia and pain and she cannot complete her sleep. This combination may cause significant depression and attention deficit more than 2 primary attention deficit disorder. Patient was told that she had fibroid for which I have not heard before but this may suggest a brain fog similar to attention deficit. Patient also reports that she had a cerebrovascular accident or event in 2016 and seen by neurology where there appeared to be a suggestion of hippocampal ischemia. This was not quite related to any recent MRI that have just reviewed and there is no residuals of the same. She reports she has been seen by multiple rheumatologist and neurologist with the names of which she cannot recall.     In the first instance recommended a neuropsychometric testing and an EEG to see what exactly this underlying features are as this may be a cycle of underlying depression and attention deficit seen with depression and sleep deprivation. A sleep study would be recommended after we evaluate her for neuropsychometric testing. This case is of high complexity and total time of evaluation her records and MRIs and past medical history is over 40  minutes    Bert Gallardo MD, [address], American Board of Psychiatry & Neurology  Board Certified in Vascular Neurology  Board Certified in Neuromuscular Medicine  Certified in [facility]:      Orders Placed This Encounter   Procedures    Amb External Referral To Psychology     Referral Priority:   Routine     Referral Type:   Eval and Treat     Referral Reason:   Specialty Services Required     Referred to Provider:   Jay Johnson     Requested Specialty:   Psychology     Number of Visits Requested:   1    EEG     Standing Status:   Future     Standing Expiration Date:   6/25/2022     No orders of the defined types were placed in this encounter. Return in about 3 months (around 7/26/2022).       Cabrera Onofre MD

## 2022-04-27 ENCOUNTER — PATIENT MESSAGE (OUTPATIENT)
Dept: FAMILY MEDICINE CLINIC | Age: 52
End: 2022-04-27

## 2022-04-28 NOTE — TELEPHONE ENCOUNTER
Yes to dr Calixto Spar  Probably eeg  He wants (dr Oscar Quiroga) wants to rule out seizure activity I suspect

## 2022-05-11 ENCOUNTER — OFFICE VISIT (OUTPATIENT)
Dept: FAMILY MEDICINE CLINIC | Age: 52
End: 2022-05-11
Payer: COMMERCIAL

## 2022-05-11 VITALS
HEART RATE: 89 BPM | RESPIRATION RATE: 15 BRPM | WEIGHT: 144 LBS | OXYGEN SATURATION: 98 % | BODY MASS INDEX: 23.14 KG/M2 | HEIGHT: 66 IN | TEMPERATURE: 97.8 F | DIASTOLIC BLOOD PRESSURE: 76 MMHG | SYSTOLIC BLOOD PRESSURE: 122 MMHG

## 2022-05-11 DIAGNOSIS — M79.7 FIBROMYALGIA: Primary | ICD-10-CM

## 2022-05-11 DIAGNOSIS — F41.9 ANXIETY: ICD-10-CM

## 2022-05-11 PROCEDURE — 99214 OFFICE O/P EST MOD 30 MIN: CPT | Performed by: INTERNAL MEDICINE

## 2022-05-11 PROCEDURE — 93000 ELECTROCARDIOGRAM COMPLETE: CPT | Performed by: INTERNAL MEDICINE

## 2022-05-11 RX ORDER — AMITRIPTYLINE HYDROCHLORIDE 10 MG/1
10 TABLET, FILM COATED ORAL NIGHTLY
Qty: 30 TABLET | Refills: 0 | Status: SHIPPED | OUTPATIENT
Start: 2022-05-11 | End: 2022-06-23

## 2022-05-11 SDOH — ECONOMIC STABILITY: FOOD INSECURITY: WITHIN THE PAST 12 MONTHS, YOU WORRIED THAT YOUR FOOD WOULD RUN OUT BEFORE YOU GOT MONEY TO BUY MORE.: NEVER TRUE

## 2022-05-11 SDOH — ECONOMIC STABILITY: FOOD INSECURITY: WITHIN THE PAST 12 MONTHS, THE FOOD YOU BOUGHT JUST DIDN'T LAST AND YOU DIDN'T HAVE MONEY TO GET MORE.: NEVER TRUE

## 2022-05-11 ASSESSMENT — SOCIAL DETERMINANTS OF HEALTH (SDOH): HOW HARD IS IT FOR YOU TO PAY FOR THE VERY BASICS LIKE FOOD, HOUSING, MEDICAL CARE, AND HEATING?: NOT HARD AT ALL

## 2022-05-11 ASSESSMENT — ENCOUNTER SYMPTOMS
EYE PAIN: 0
BACK PAIN: 0
SHORTNESS OF BREATH: 0
ABDOMINAL PAIN: 0

## 2022-05-11 NOTE — PROGRESS NOTES
Subjective:      Patient ID: Raynald Cushing is a 45 y.o. female who presents today with:  Chief Complaint   Patient presents with    Chronic Pain     pt is having flare up on and off        HPI   Here for follow up     Past Medical History:   Diagnosis Date    Abnormal Pap smear of cervix     Depression     Depression with anxiety     Depression with anxiety     Iron deficiency anemia 2019    Stroke ([address] 2016    Idiopathic she is still in process     Thyroid disease      Past Surgical History:   Procedure Laterality Date    BLADDER SURGERY      done by Dr. [facility] x10 yrs    COLONOSCOPY  2019    KERRY AMADO MD    DILATION AND CURETTAGE      ENDOMETRIAL ABLATION      TONSILLECTOMY       Social History     Socioeconomic History    Marital status:      Spouse name: Not on file    Number of children: Not on file    Years of education: Not on file    Highest education level: Not on file   Occupational History    Not on file   Tobacco Use    Smoking status: Former Smoker     Packs/day: 0.25     Years: 3.00     Pack years: 0.75     Types: Cigarettes     Start date: 1988     Quit date: 1990     Years since quittin.8    Smokeless tobacco: Never Used   Vaping Use    Vaping Use: Never used   Substance and Sexual Activity    Alcohol use: Yes     Comment: ocassionally    Drug use: No    Sexual activity: Not Currently   Other Topics Concern    Not on file   Social History Narrative    Not on file     Social Determinants of Health     Financial Resource Strain: Low Risk     Difficulty of Paying Living Expenses: Not hard at all   Food Insecurity: No Food Insecurity    Worried About [address] in the Last Year: Never true    [address] in the Last Year: Never true   Transportation Needs:     Lack of Transportation (Medical): Not on file    Lack of Transportation (Non-Medical):  Not on file   Physical Activity:     Days of Exercise per Week: Not on file    Minutes of Exercise per Session: Not on file   Stress:     Feeling of Stress : Not on file   Social Connections:     Frequency of Communication with Friends and Family: Not on file    Frequency of Social Gatherings with Friends and Family: Not on file    Attends Rastafari Services: Not on file    Active Member of [address] [place] [address] or Organizations: Not on file    Attends Club or Organization Meetings: Not on file    Marital Status: Not on file   Intimate Partner Violence:     Fear of Current or Ex-Partner: Not on file    Emotionally Abused: Not on file    Physically Abused: Not on file    Sexually Abused: Not on file   Housing Stability:     Unable to Pay for Housing in the Last Year: Not on file    Number of [place] in the Last Year: Not on file    Unstable Housing in the Last Year: Not on file     Allergies   Allergen Reactions    Amoxicillin Hives    Nsaids Other (See Comments)     STROKE/NO NSAIDS ALLOWED    Pcn [Penicillins] Rash     Current Outpatient Medications on File Prior to Visit   Medication Sig Dispense Refill    levothyroxine (SYNTHROID) 100 MCG tablet Take 1 tablet by mouth daily 90 tablet 3    aspirin 81 MG tablet Take 81 mg by mouth Takes 2 tablets daily      nitrofurantoin (MACRODANTIN) 50 MG capsule Oral: 50 mg as a single dose taken within 2 hours of sexual intercourse 90 capsule 1    fluticasone (FLONASE) 50 MCG/ACT nasal spray 1 spray by Each Nostril route daily as needed        No current facility-administered medications on file prior to visit. I have personally reviewed the ROS, PMH, PFH, and social history     Review of Systems   Constitutional: Negative for chills and fever. HENT: Negative for congestion. Eyes: Negative for pain. Respiratory: Negative for shortness of breath. Cardiovascular: Negative for chest pain. Gastrointestinal: Negative for abdominal pain. Genitourinary: Negative for hematuria. Musculoskeletal: Negative for back pain. Allergic/Immunologic: Negative for immunocompromised state. Neurological: Negative for headaches. Psychiatric/Behavioral: Negative for hallucinations. Objective:   /76   Pulse 89   Temp 97.8 °F (36.6 °C) (Tympanic)   Resp 15   Ht 5' 6\" (1.676 m)   Wt 144 lb (65.3 kg)   LMP 03/18/2019 (Approximate)   SpO2 98%   BMI 23.24 kg/m²     Physical Exam  Constitutional:       General: She is not in acute distress. Appearance: Normal appearance. She is not ill-appearing, toxic-appearing or diaphoretic. HENT:      Head: Normocephalic. Neck:      Vascular: No carotid bruit. Cardiovascular:      Rate and Rhythm: Normal rate and regular rhythm. Pulses: Normal pulses. Heart sounds: Normal heart sounds. No murmur heard. No friction rub. No gallop. Pulmonary:      Effort: Pulmonary effort is normal. No respiratory distress. Breath sounds: Normal breath sounds. No wheezing, rhonchi or rales. Abdominal:      General: Abdomen is flat. There is no distension. Palpations: Abdomen is soft. Tenderness: There is no abdominal tenderness. There is no right CVA tenderness, left CVA tenderness, guarding or rebound. Musculoskeletal:      Cervical back: Neck supple. Right lower leg: No edema. Left lower leg: No edema. Skin:     General: Skin is warm. Findings: No erythema or rash. Neurological:      Mental Status: She is alert. Cranial Nerves: No cranial nerve deficit. Psychiatric:         Mood and Affect: Mood normal.       No saddle anesthesia     Assessment:       Diagnosis Orders   1. Stacy Benitez MD, Pain Management, [place]    TSH with Reflex    Vitamin D 25 Hydroxy    CBC with Auto Differential    Comprehensive Metabolic Panel    Lipid Panel    T4, Free    EKG 12 Lead   2.  Anxiety  TSH with Reflex    Vitamin D 25 Hydroxy    CBC with Auto Differential    Comprehensive Metabolic Panel    Lipid Panel    T4, Free    EKG 12 Lead         Plan:     vc  Doesn't want flu or covid booster  Fu neuro  Rheum fu is likely going to prn  Offered pain referral she declined. She will do chiropractor  [facility] 08/2022  Mri lumbar spine not done yet  Start tca  F/U with ob/gyn  Doesn't want ekg or holter right now   Thyroid US in 12/2023, if stable, no further fu    EEG per neuro   if no clear cause see ortho. (wants to hold off)                     Orders Placed This Encounter   Procedures    TSH with Reflex     Standing Status:   Future     Standing Expiration Date:   5/11/2023    Vitamin D 25 Hydroxy     Standing Status:   Future     Standing Expiration Date:   5/11/2023    CBC with Auto Differential     Standing Status:   Future     Standing Expiration Date:   5/11/2023    Comprehensive Metabolic Panel     Standing Status:   Future     Standing Expiration Date:   5/11/2023    Lipid Panel     Standing Status:   Future     Standing Expiration Date:   5/11/2023     Order Specific Question:   Is Patient Fasting?/# of Hours     Answer:   10    T4, Free     Standing Status:   Future     Standing Expiration Date:   5/11/2023   Ricardo Yoon MD, Pain Management, [place]     Referral Priority:   Routine     Referral Type:   Eval and Treat     Referral Reason:   Specialty Services Required     Referred to Provider:   Cassandra Camargo MD     Requested Specialty:   Physical Medicine and Rehab     Number of Visits Requested:   1    EKG 12 Lead     Order Specific Question:   Reason for Exam?     Answer: Other     Orders Placed This Encounter   Medications    amitriptyline (ELAVIL) 10 MG tablet     Sig: Take 1 tablet by mouth nightly     Dispense:  30 tablet     Refill:  0   I explained the risk of addiction, withdrawal, overdose, death, etc. Concurrent use with medications including but not limited to benzodiazepines, etc can increase risk of overdose and death.  They improve the severity of pain, and patient denies side effects such as but not limited to resp depression, lethargy, etc. [place]ARRS reviewed. Patient knows not to drive or operate machinery while using opioids. She will contact her rheum about tca  If that fails will consider norco as last resort before she gets into pain mgmt   PT didn't help her  She will do chiropractor. Risks of stroke, mi, etc  Cardiac stuff   Addendum gjd 5/15/2022 patient refused ekg  Risks explained. Understands SSRI can increase SI and to call immediately if this should occur. She will fu with ophMatology as well. Patient was offered pregnancy test, she declined, she understands risks of birth defects. If anything should change or worsen call ASAP, don't wait for next scheduled appointment. Return in about 10 weeks (around 7/20/2022) for Chronic condition management/appointment, worsening symptoms, call ASAP for appointment.       Wallace Cruz MD

## 2022-05-12 ENCOUNTER — TELEPHONE (OUTPATIENT)
Dept: FAMILY MEDICINE CLINIC | Age: 52
End: 2022-05-12

## 2022-05-12 ENCOUNTER — PATIENT MESSAGE (OUTPATIENT)
Dept: FAMILY MEDICINE CLINIC | Age: 52
End: 2022-05-12

## 2022-05-12 NOTE — TELEPHONE ENCOUNTER
I can't comment on her safety to drive yet  Unfortunately an eeg was ordered by neurology and not completed yet

## 2022-05-12 NOTE — TELEPHONE ENCOUNTER
Patient scheduled for lumbar MRI but would like to know what you recommend on her day to day driving. Is she cleared to drive?

## 2022-05-12 NOTE — TELEPHONE ENCOUNTER
Unfortunately I didn't examine her neck   Can we get her in with first available  Just for an exam and they can order mri and I can go over both when I see her   Thanks.

## 2022-05-16 ENCOUNTER — HOSPITAL ENCOUNTER (OUTPATIENT)
Dept: NEUROLOGY | Age: 52
Discharge: HOME | End: 2022-05-16
Payer: COMMERCIAL

## 2022-05-16 DIAGNOSIS — R41.3 MEMORY LOSS: ICD-10-CM

## 2022-05-16 PROCEDURE — 95816 EEG AWAKE AND DROWSY: CPT

## 2022-05-18 NOTE — PROGRESS NOTES
[address]  [address]:[phone]     []? Certification  []? Recertification     []? Plan of Care  []? Progress Note [x]? Discharge                            To:  Dr. Andrez Cortez               [facility]  Patient: Joycelyn Apley     : 1970  Diagnosis: Polyarthralgia, OA R knee, B hip pain     Date: 2022  Treatment Diagnosis: B hip pain, R knee OA     Progress Report Period from:  2022  to 3/24/2022     Total # of Visits to Date: 5   No Show: 0    Canceled Appointment: 2      OBJECTIVE:   Short Term Goals - Time Frame for Short term goals: 2 weeks    Goals Current/Discharge status  Met   Short term goal 1: The pt will demonstrate improved postural awareness requiring <25% VC's throughout treatment  Pt did not return to PT after 3/24/22 unable to determine functional outcomes d/t unexpected D/C. []? yes  [x]? no   Short term goal 2: Decrease B hips and R knee pain 50% to assist with improved functional gains. Taken 3/24/22  Pain Location: Knee,Hip    Pain Level: 8 (R knee and B hips and really \"all over\")    Pain Descriptors: Aching,Burning,Sharp,Shooting, tiring []? yes  [x]? no   Short term goal 3: Improve hamstring flexibility -20° @90/90 hip/knee flexion for improved posture awareness Pt did not return to PT after 3/24/22 unable to determine functional outcomes d/t unexpected D/C.   []? yes  [x]? no      Long Term Goals - Time Frame for Long term goals : 4 weeks  Goals Current/ Discharge status Met   Long term goal 1: Indep HEP for symptom management Written HEP initiated  for symptom management  Needs progression for comprehensive program development. []? yes  [x]? no   Long term goal 2: Pt demo improved overall function by reporting greater than 50% per functional survey score Taken 2022:  Exam: LEFS =28% functional    []?  yes  [x]? no   Long term goal 3: Pain-free R knee 0-120 deg and R hip abd 25 deg AROM to WNL allowing an increase in ADL tolerance. .un Pt did not return to PT after 3/24/22 unable to determine functional outcomes d/t unexpected D/C. []? yes  [x]? no   Long term goal 4: Improve R LE strength 4/5 to 4+/5 to  allow patient to recip stair climb 5 steps with one rail indep Taken 3/24/22:  Strength RLE  Comment: Hip flex, Abd 4-5, Ext 4/5, knee 3+ to 4-/5, ankle 3+/5  Strength LLE  Comment: Hip 4/5, knee 4/5, ankle 4/5 unable to complete stairs goal.  []? yes  [x]? no      Body structures, Functions, Activity limitations: Decreased functional mobility ,Increased pain,Decreased posture,Decreased ROM,Decreased strength  Assessment: Pt did not return to PT after 3/24/2022 unable to determine functional outcomes d/t unexpected D/C. Pt demonstrated increase pain with attempts to complete active exercises. Manual traction for the lumbar spine did present relief with movement. Pt stated working was becoming more of a challenge d/t pain and weakness in the LE's . Stated memory loss issue again and waiting for brain MRI. Pt stated was not sure if she can continue to work and may look into disability. Prognosis: Good  Discharge Recommendations: Discharge       PLAN: [x]? Dicharged                                        Patient Status:[]? Continue/ Initiate plan of Care                          [x]? Discharge PT. Recommend pt continue with HEP.                            []? Additional visits requested, Please re-certify for additional visits:                                                       Obj info: Electronically signed by Oliva Li PTA on 5/18/2022 at 1:44 PM  Signature: Electronically signed by Nan Burroughs PT on 5/18/2022 at 4:14 PM

## 2022-05-19 ENCOUNTER — INITIAL CONSULT (OUTPATIENT)
Dept: PAIN MANAGEMENT | Age: 52
End: 2022-05-19
Payer: COMMERCIAL

## 2022-05-19 VITALS
HEIGHT: 66 IN | TEMPERATURE: 97.8 F | DIASTOLIC BLOOD PRESSURE: 72 MMHG | BODY MASS INDEX: 23.14 KG/M2 | WEIGHT: 144 LBS | SYSTOLIC BLOOD PRESSURE: 118 MMHG

## 2022-05-19 DIAGNOSIS — Z51.81 ENCOUNTER FOR MONITORING OPIOID MAINTENANCE THERAPY: ICD-10-CM

## 2022-05-19 DIAGNOSIS — M54.2 NECK PAIN: Primary | ICD-10-CM

## 2022-05-19 DIAGNOSIS — F11.90 CHRONIC, CONTINUOUS USE OF OPIOIDS: ICD-10-CM

## 2022-05-19 DIAGNOSIS — Z79.891 ENCOUNTER FOR MONITORING OPIOID MAINTENANCE THERAPY: ICD-10-CM

## 2022-05-19 DIAGNOSIS — M25.50 POLYARTHRALGIA: ICD-10-CM

## 2022-05-19 DIAGNOSIS — M47.817 LUMBOSACRAL SPONDYLOSIS WITHOUT MYELOPATHY: ICD-10-CM

## 2022-05-19 PROCEDURE — 99204 OFFICE O/P NEW MOD 45 MIN: CPT | Performed by: PHYSICAL MEDICINE & REHABILITATION

## 2022-05-19 RX ORDER — NALOXONE HYDROCHLORIDE 4 MG/.1ML
1 SPRAY NASAL PRN
Qty: 1 EACH | Refills: 0 | Status: SHIPPED | OUTPATIENT
Start: 2022-05-19 | End: 2022-08-11

## 2022-05-19 RX ORDER — LIDOCAINE 40 MG/G
CREAM TOPICAL
Qty: 45 G | Refills: 1 | Status: SHIPPED | OUTPATIENT
Start: 2022-05-19

## 2022-05-19 RX ORDER — HYDROCODONE BITARTRATE AND ACETAMINOPHEN 5; 325 MG/1; MG/1
1 TABLET ORAL DAILY PRN
Qty: 10 TABLET | Refills: 0 | Status: SHIPPED | OUTPATIENT
Start: 2022-05-19 | End: 2022-06-18

## 2022-05-19 ASSESSMENT — ENCOUNTER SYMPTOMS
NAUSEA: 0
BACK PAIN: 1
SHORTNESS OF BREATH: 0
DIARRHEA: 0
CONSTIPATION: 0

## 2022-05-19 NOTE — PROGRESS NOTES
He Cerda  (1970)    5/19/2022    Subjective:     He Cerda is 45 y.o. female who complains today of:    Chief Complaint   Patient presents with    Back Pain    Neck Pain       He Cerda is a 45 y.o. female who presents for evaluation by request of Dr. Shruthi Martinez for fibromyalgia. She has struggled with pain for over 5 years. She denies any immediately-preceding traumatic or inciting events. She has been previously evaluated by Dr Jeffrey Lorenzana whose records are reviewed below. She describes pain located in both sides of her low back. No leg pain. Pain is a constant ache and is currently a 7/10 and gets up to a 10/10 at its worst and goes down to a 6/10 at its best. Pain is worse with bending and walking. Pain is better with very little. Pain is located 50% on the right and 50% on the left. Pain is located 90% in the back and 10% in the legs. Neck pain is a 5/10. Gets to a 10/10. Located in both sides of her neck. No arm pain. Constant ache for over 4 years. Worse with activity, better with very little. She denies any numbness, tingling, weakness, bowel or bladder dysfunction, saddle anesthesia, falls, history of cancer, unexplained weight loss, persistent night pain and sweats, fever, IV drug abuse, immunocompromise, chronic prednisone or antibiotic use, or any other red flag symptoms. Mood is down, denies any suicidal or homicidal ideation. Sleep is poor, awakes fatigued.     She has tried:  Home exercise program with minimal relief  PT at [facility] March 2022    Diagnostic testing previously performed includes XRs and MRI    Medications tried include:  Acetaminophen with minimal relief for over 3 months  Ibuprofen with minimal relief for over 3 months  Elavil Amitriptyline 10 mg     Allergies, Medications, Past Medical History, Family History, Social History, Work History, and Review of Systems reviewed below    +stroke diagnosis 2016 with Dr Franci Griffin, no clear diagnosis of stroke from recent neurology evaluation  +Depression and Anxiety no medications  +Rheumatology Dr Le Mead ?sp, diagnosed with fibromyalgia, patient states negative for Lupus and Rheumatoid Arthritis    No Seizures, Epilepsy or Brain Surgery     Spends her time: 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 She left her job due to difficulty with focus memory and concentration since fibromyalgia began, last worked 2022. She used to enjoy spend time with family and friends. Exercise and garden. Allergies:  Amoxicillin, Nsaids, and Pcn [penicillins]    Past Medical History:   Diagnosis Date    Abnormal Pap smear of cervix     Depression     Depression with anxiety     Depression with anxiety     Iron deficiency anemia 2019    Stroke ([address] 2016    Idiopathic she is still in process     Thyroid disease      Past Surgical History:   Procedure Laterality Date    BLADDER SURGERY      done by Dr. IDANIA Burciaga 23 x10 yrs    COLONOSCOPY  2019    KERRY AMADO MD    DILATION AND CURETTAGE      ENDOMETRIAL ABLATION      TONSILLECTOMY       History reviewed. No pertinent family history.   Social History     Socioeconomic History    Marital status:      Spouse name: Not on file    Number of children: Not on file    Years of education: Not on file    Highest education level: Not on file   Occupational History    Not on file   Tobacco Use    Smoking status: Former Smoker     Packs/day: 0.25     Years: 3.00     Pack years: 0.75     Types: Cigarettes     Start date: 1988     Quit date: 1990     Years since quittin.8    Smokeless tobacco: Never Used   Vaping Use    Vaping Use: Never used   Substance and Sexual Activity    Alcohol use: Yes     Comment: ocassionally    Drug use: No    Sexual activity: Not Currently   Other Topics Concern    Not on file   Social History Narrative    Not on file     Social Determinants of Health Financial Resource Strain: Low Risk     Difficulty of Paying Living Expenses: Not hard at all   Food Insecurity: No Food Insecurity    Worried About Running Out of Food in the Last Year: Never true    Jose Antonio of Food in the Last Year: Never true   Transportation Needs:     Lack of Transportation (Medical): Not on file    Lack of Transportation (Non-Medical): Not on file   Physical Activity:     Days of Exercise per Week: Not on file    Minutes of Exercise per Session: Not on file   Stress:     Feeling of Stress : Not on file   Social Connections:     Frequency of Communication with Friends and Family: Not on file    Frequency of Social Gatherings with Friends and Family: Not on file    Attends [employer] Services: Not on file    Active Member of [address] [place] [address] [employer] or Organizations: Not on file    Attends Club or Organization Meetings: Not on file    Marital Status: Not on file   Intimate Partner Violence:     Fear of Current or Ex-Partner: Not on file    Emotionally Abused: Not on file    Physically Abused: Not on file    Sexually Abused: Not on file   Housing Stability:     Unable to Pay for Housing in the Last Year: Not on file    Number of [place] in the Last Year: Not on file    Unstable Housing in the Last Year: Not on file       Current Outpatient Medications on File Prior to Visit   Medication Sig Dispense Refill    amitriptyline (ELAVIL) 10 MG tablet Take 1 tablet by mouth nightly 30 tablet 0    nitrofurantoin (MACRODANTIN) 50 MG capsule Oral: 50 mg as a single dose taken within 2 hours of sexual intercourse 90 capsule 1    fluticasone (FLONASE) 50 MCG/ACT nasal spray 1 spray by Each Nostril route daily as needed       aspirin 81 MG tablet Take 81 mg by mouth Takes 2 tablets daily      levothyroxine (SYNTHROID) 100 MCG tablet Take 1 tablet by mouth daily 90 tablet 3     No current facility-administered medications on file prior to visit.        Review of Systems   Constitutional: Negative for fever. HENT: Negative for hearing loss. Respiratory: Negative for shortness of breath. Gastrointestinal: Negative for constipation, diarrhea and nausea. Genitourinary: Negative for difficulty urinating. Musculoskeletal: Positive for back pain and neck pain. Skin: Negative for rash. Neurological: Negative for headaches. Hematological: Does not bruise/bleed easily. Psychiatric/Behavioral: Negative for sleep disturbance. Objective:     Vitals:  /72   Temp 97.8 °F (36.6 °C)   Ht 5' 6\" (1.676 m)   Wt 144 lb (65.3 kg)   LMP 03/18/2019 (Approximate)   BMI 23.24 kg/m² Pain Score:   7      Exam performed under Coronavirus precautions  Gen: No acute distress  Neck: Grossly symmetric without any significant thyromegaly or masses appreciated. Eyes: No scleral icterus or lid lag appreciated bilaterally. Irises without gross defects bilaterally. HEENT: Hearing grossly intact bilaterally. Normocephalic, external ears and visible portions of nose and mouth atraumatic. Lymph: No gross neck or axillary lymphadenopathy  Cardio: No significant lower extremity edema, pulses intact without significant digit ischemia. Abd: No gross masses or large hernias appreciated. Skin: Visualized skin without any dermatomal rashes or sores. Palpation free of any tightening or subcutaneous nodules. MSK: Gait is antalgic. No significant upper limb digit ischemia appreciated. Psych: Pleasant and cooperative with the history and exam. Mood and Affect normal. Appropriately dressed with good eye contact. Judgement and insight normal. Recent and remote memory intact. Alert and Oriented x3. Neuro: Cranial nerves II-XII grossly intact. No significant pathologic reflexes appreciated. Rises from a seated to standing position with mild difficulty. Gait is antalgic. No assistive devices used. Heel and toe walk intact. Lumbar flexion to 70 degrees, extension to 25 degrees.  Limited lumbar spine range of motion. Rotation and extension reproduces axial low back pain. Other facet provocative maneuvers are positive. No gross step offs noted. Tenderness to palpation over the mid to low lumbar spinous processes and bilateral lumbar paraspinals from L2 down to the sacrum. No tenderness over bilateral PSIS. No tenderness over bilateral greater trochanters. No tenderness over bilateral deep gluteal regions. Sensation grossly intact in both legs. Reflexes and strength functional for ambulation, no abnormal reflexes appreciated on exam today  Strength greater than 3/5 bilateral legs  Straight leg raise negative bilaterally. Sensation intact in both arms  Reflexes and strength functional for arm use, no abnormal reflexes appreciated on exam today  Strength greater than 3/5 in both arms  Spurling's negative on exam today    There is tenderness to palpation over cervical spinous processes from C4 down to T1 with bilateral cervical paraspinal muscle tenderness. Rotation and extension reproduces axial neck pain. Other facet provocative maneuvers are positive. Outside record review:  Review of the original consultation request reveals no specific diagnostic requests or clinical concerns aside from fibromyalgia that require particular attention. There are no suggested, requested, or specified tests to be ordered or any prior diagnostics performed that require follow-up or further investigation. Dr Sara Spain 5/11/22: contact rheum about tca. Consider norco last resort pain management. Pt didn't help her. Doing chiropractor. MRI Brain 3/22/22: no changes, no ischemia or hemorrhage  XR LS Spine 8/26/21: scoliosis, lumbarization S1 with 6 lumbar like vertebrae. degenerative disc disease and facet arthropathy. SI joints maintained  XR R Knee 8/26/21: no fracture, narrowing medial compartments. XR T Hip 8/26/21: SI joints maintained, mild joint space narrowing left hip, no fracture.    MRI LS Spine 5/28/16: understanding and explicit agreement. She will sign this form today.  -Will obtain a urine drug screen today. Pt denies any illicit substances. Pt has not used any controlled substances within the last week. -OARRS 5/19/2022 was reviewed    Controlled Substances Monitoring: Periodic Controlled Substance Monitoring: Possible medication side effects, risk of tolerance/dependence & alternative treatments discussed. ,No signs of potential drug abuse or diversion identified. ,Assessed functional status. ,Obtaining appropriate analgesic effect of treatment. ,Random urine drug screen sent today. Shaina Drake MD)     Family history of alcohol abuse 0  Family history of illegal drug abuse 0  Family history of prescription drug abuse 0    Personal history of alcohol abuse/DUI 0  Personal history of illegal drug abuse 0  Personal history of prescription drug abuse 0    Age between 17-45 0    History of preadolescent sexual abuse 0    Personal history of obsessive compulsive disorder 0  Personal history of attention deficit disorder 0  Personal history of bipolar disorder 0  Personal history of schizophrenia 0  Personal history of depression +1    Score = 1, low risk  Assessment:      Diagnosis Orders   1. Neck pain  XR CERVICAL SPINE (2-3 VIEWS)    [facility]    HYDROcodone-acetaminophen (NORCO) 5-325 MG per tablet    lidocaine (LMX) 4 % cream   2. Lumbosacral spondylosis without myelopathy  Urine Drug Screen    HYDROcodone-acetaminophen (NORCO) 5-325 MG per tablet    lidocaine (LMX) 4 % cream   3. Chronic, continuous use of opioids  HYDROcodone-acetaminophen (NORCO) 5-325 MG per tablet    naloxone 4 MG/0.1ML LIQD nasal spray   4. Encounter for monitoring opioid maintenance therapy  HYDROcodone-acetaminophen (NORCO) 5-325 MG per tablet   5.  Polyarthralgia  HYDROcodone-acetaminophen (NORCO) 5-325 MG per tablet       Plan:     Periodic Controlled Substance Monitoring: Possible medication side effects, risk of tolerance/dependence & alternative treatments discussed. ,No signs of potential drug abuse or diversion identified. ,Assessed functional status. ,Obtaining appropriate analgesic effect of treatment. ,Random urine drug screen sent today. Marina Mosqueda MD)    Orders Placed This Encounter   Medications    HYDROcodone-acetaminophen (NORCO) 5-325 MG per tablet     Sig: Take 1 tablet by mouth daily as needed for Pain for up to 30 days. Dispense:  10 tablet     Refill:  0     Reduce doses taken as pain becomes manageable    naloxone 4 MG/0.1ML LIQD nasal spray     Si spray by Nasal route as needed for Opioid Reversal     Dispense:  1 each     Refill:  0    lidocaine (LMX) 4 % cream     Sig: Apply a half dollar sized amount to intact skin topically up to twice daily as needed for pain     Dispense:  45 g     Refill:  1       Orders Placed This Encounter   Procedures    XR CERVICAL SPINE (2-3 VIEWS)     Standing Status:   Future     Standing Expiration Date:   2023     Order Specific Question:   Reason for exam:     Answer:   Please evaluate for the presence of cervical facet arthropathy    Urine Drug Screen    [facility]     Referral Priority:   Routine     Referral Type:   Eval and Treat     Referral Reason:   Specialty Services Required     Requested Specialty:   Physical Therapist     Number of Visits Requested:   1     --PT for neck pain  -XR C spine ap lat eval facet arthropathy   -Reviewed XR LS spine above, all questions answered  - Await MRI L-spine results to discuss further treatment options of her low back pain  -Discussed fibromyalgia treatment in depth, limited treatment options. She does not do well with medications so she wants to hold on currently on Cymbalta Savella Lyrica.   -Encourage trial with Amitripytline as she has been doing with Dr Quinten Mccauley.  -Consideration for MRI C Spine without contrast may be given if her symptoms do not improve with conservative treatment  -Lidocaine 4% ointment topical BID prn #1 tube one refill start 5/19/2022   -Who is we will hold on NSAIDs at this time. Consideration for meloxicam may be given in the future. -Given severe pain and impairment in actives of daily living, loss of job and inability to do dressing and normal daily activities, will start low-dose of:  - Start Norco 5/325 daily as needed number 10 tablets/month valid 5/19/2022 - 6/18/2022  - OARRS reviewed on 5/19/2022  - Naloxone prescribed on 5/19/2022. MME less than 5  -She is not nursing any intervention at this time. Controlled Substance Monitoring:    Acute and Chronic Pain Monitoring:   RX Monitoring 5/19/2022   Periodic Controlled Substance Monitoring Possible medication side effects, risk of tolerance/dependence & alternative treatments discussed. ;No signs of potential drug abuse or diversion identified. ;Assessed functional status. ;Obtaining appropriate analgesic effect of treatment. ;Random urine drug screen sent today. Discussed the risks, side effects, and symptoms that would warrant urgent or emergent physician evaluation of all medications prescribed today. Provided education and counseling regarding the diagnosis, prognosis, and treatment options. All questions were answered. Encouraged her to follow-up with her primary care physician and/or specialists as required for her overall health and management of her comorbidities as well as any new positive symptoms mentioned in review of systems above. Care was provided within the definitions and limitations of our specialty practice. Encouraged lifestyle interventions including healthy habits, lifestyle changes, regular aerobic exercise and appropriate weight maintenance as advised by their primary care physician or cardiovascular health provider. Discussed well care and disease prevention/maintenance.      All recommendations for therapy are provided to improve function with activities of daily living, decrease pain, and help develop an exercise program. All recommendations for medications are meant to help decrease pain, improve function with activities of daily living, maintain compliance with home exercise program, and improve quality of life. Encouraged compliance with her home exercise program. Recommended compliance with physical therapy program as outlined above. Discussed the elevated risks of excessive sedation while on pain medications. Advised her against driving or operating heavy machinery or performing any activities where she may harm herself or others while on pain medications. Particular caution was emphasized especially during dose adjustments and medication changes. Discussed the elevated risks of respiratory depression and death while on opioid medications, especially when combined with other sedative substances. Discussed side effects of opioids including, but not limited to, itching, constipation, nausea, and vomiting. The patient does not demonstrate any overt signs of alcohol or drug abuse, and there are no potential contraindications to the use of controlled substances. The relevant previous medical records were reviewed. The patient continues to make good-rosie efforts to reduce and eliminate use of opioids through our comprehensive multidisciplinary pain treatment program.    Discussed the risks of temporary disability, permanent disability, morbidity, and mortality with poorly-managed or undiagnosed medical conditions and comorbidities. Emphasized the importance of timely medical evaluation and treatment as previously recommended by us or other medical professionals. Risks of not pursing these recommendations were emphasized. The patient was offered a treatment at our facility.  The physician and patient have discussed in detail the risk of exposure to and/or potential harm posed by the COVID-19 virus with having office visits and procedures at this time versus the risk of delaying the visits and procedures. It is not possible to know either the risk of delaying the visits or procedure or chance of getting an infection with perfect accuracy, but a joint decision was made between the patient and the physician to proceed at this time with the scheduled visits and procedures. Advised her that any lab testing, imaging, or other diagnostic test results are best discussed in person in the office so that we can provide a clear explanation of their significance and best treatment based upon these results. It is her responsibility to make and keep a follow up appointment to discuss these test results in person to discuss the significance of the findings and appropriate follow-up steps. She expressed complete understanding and agreement with the entire plan as outlined above. Portions of this note may have been typed, auto-populated, dictated or transcribed by voice recognition resulting in errors, omissions, or close substitutions which may be missed despite careful proofreading. Please contact the author for any questions or concerns. Thank you Dr. Manfred Hernandez for the opportunity to participate in this patient's care. If you have any questions or concerns, please do not hesitate to contact us. Follow up:  Return in about 1 month (around 6/19/2022) for reassessment of pain and symptoms, P.T. Internal Ref.     Cullen Fitzgerald MD

## 2022-05-23 ENCOUNTER — TELEPHONE (OUTPATIENT)
Dept: NEUROLOGY | Age: 52
End: 2022-05-23

## 2022-05-24 ENCOUNTER — ANESTHESIA EVENT (OUTPATIENT)
Dept: MRI IMAGING | Age: 52
End: 2022-05-24

## 2022-05-24 ENCOUNTER — ANESTHESIA (OUTPATIENT)
Dept: MRI IMAGING | Age: 52
End: 2022-05-24

## 2022-05-24 ENCOUNTER — HOSPITAL ENCOUNTER (OUTPATIENT)
Dept: MRI IMAGING | Age: 52
Discharge: HOME | End: 2022-05-26
Payer: COMMERCIAL

## 2022-05-24 VITALS
SYSTOLIC BLOOD PRESSURE: 138 MMHG | HEIGHT: 66 IN | WEIGHT: 144 LBS | HEART RATE: 72 BPM | OXYGEN SATURATION: 98 % | DIASTOLIC BLOOD PRESSURE: 60 MMHG | TEMPERATURE: 97 F | BODY MASS INDEX: 23.14 KG/M2 | RESPIRATION RATE: 16 BRPM

## 2022-05-24 DIAGNOSIS — G89.29 CHRONIC LOW BACK PAIN, UNSPECIFIED BACK PAIN LATERALITY, UNSPECIFIED WHETHER SCIATICA PRESENT: ICD-10-CM

## 2022-05-24 DIAGNOSIS — M54.50 CHRONIC LOW BACK PAIN, UNSPECIFIED BACK PAIN LATERALITY, UNSPECIFIED WHETHER SCIATICA PRESENT: ICD-10-CM

## 2022-05-24 PROCEDURE — 7100000010 HC PHASE II RECOVERY - FIRST 15 MIN

## 2022-05-24 PROCEDURE — 3700000000 HC ANESTHESIA ATTENDED CARE

## 2022-05-24 PROCEDURE — 2580000003 HC RX 258: Performed by: STUDENT IN AN ORGANIZED HEALTH CARE EDUCATION/TRAINING PROGRAM

## 2022-05-24 PROCEDURE — 3700000001 HC ADD 15 MINUTES (ANESTHESIA)

## 2022-05-24 PROCEDURE — 6360000002 HC RX W HCPCS: Performed by: NURSE ANESTHETIST, CERTIFIED REGISTERED

## 2022-05-24 PROCEDURE — 2580000003 HC RX 258: Performed by: NURSE ANESTHETIST, CERTIFIED REGISTERED

## 2022-05-24 PROCEDURE — 7100000011 HC PHASE II RECOVERY - ADDTL 15 MIN

## 2022-05-24 PROCEDURE — 72148 MRI LUMBAR SPINE W/O DYE: CPT

## 2022-05-24 PROCEDURE — 2500000003 HC RX 250 WO HCPCS: Performed by: NURSE ANESTHETIST, CERTIFIED REGISTERED

## 2022-05-24 RX ORDER — EPHEDRINE SULFATE 50 MG/ML
INJECTION, SOLUTION INTRAVENOUS PRN
Status: DISCONTINUED | OUTPATIENT
Start: 2022-05-24 | End: 2022-05-24 | Stop reason: SDUPTHER

## 2022-05-24 RX ORDER — SODIUM CHLORIDE 0.9 % (FLUSH) 0.9 %
5-40 SYRINGE (ML) INJECTION PRN
Status: CANCELLED | OUTPATIENT
Start: 2022-05-24

## 2022-05-24 RX ORDER — PROPOFOL 10 MG/ML
INJECTION, EMULSION INTRAVENOUS CONTINUOUS PRN
Status: DISCONTINUED | OUTPATIENT
Start: 2022-05-24 | End: 2022-05-24 | Stop reason: SDUPTHER

## 2022-05-24 RX ORDER — SODIUM CHLORIDE, SODIUM LACTATE, POTASSIUM CHLORIDE, CALCIUM CHLORIDE 600; 310; 30; 20 MG/100ML; MG/100ML; MG/100ML; MG/100ML
INJECTION, SOLUTION INTRAVENOUS CONTINUOUS PRN
Status: DISCONTINUED | OUTPATIENT
Start: 2022-05-24 | End: 2022-05-24 | Stop reason: SDUPTHER

## 2022-05-24 RX ORDER — OXYCODONE HYDROCHLORIDE 5 MG/1
10 TABLET ORAL PRN
Status: CANCELLED | OUTPATIENT
Start: 2022-05-24 | End: 2022-05-24

## 2022-05-24 RX ORDER — ONDANSETRON 2 MG/ML
4 INJECTION INTRAMUSCULAR; INTRAVENOUS
Status: CANCELLED | OUTPATIENT
Start: 2022-05-24 | End: 2022-05-24

## 2022-05-24 RX ORDER — SODIUM CHLORIDE 9 MG/ML
25 INJECTION, SOLUTION INTRAVENOUS PRN
Status: CANCELLED | OUTPATIENT
Start: 2022-05-24

## 2022-05-24 RX ORDER — SODIUM CHLORIDE 0.9 % (FLUSH) 0.9 %
5-40 SYRINGE (ML) INJECTION EVERY 12 HOURS SCHEDULED
Status: CANCELLED | OUTPATIENT
Start: 2022-05-24

## 2022-05-24 RX ORDER — MIDAZOLAM HYDROCHLORIDE 1 MG/ML
INJECTION INTRAMUSCULAR; INTRAVENOUS PRN
Status: DISCONTINUED | OUTPATIENT
Start: 2022-05-24 | End: 2022-05-24 | Stop reason: SDUPTHER

## 2022-05-24 RX ORDER — DIPHENHYDRAMINE HYDROCHLORIDE 50 MG/ML
12.5 INJECTION INTRAMUSCULAR; INTRAVENOUS
Status: CANCELLED | OUTPATIENT
Start: 2022-05-24 | End: 2022-05-24

## 2022-05-24 RX ORDER — OXYCODONE HYDROCHLORIDE 5 MG/1
5 TABLET ORAL PRN
Status: CANCELLED | OUTPATIENT
Start: 2022-05-24 | End: 2022-05-24

## 2022-05-24 RX ORDER — SODIUM CHLORIDE, SODIUM LACTATE, POTASSIUM CHLORIDE, CALCIUM CHLORIDE 600; 310; 30; 20 MG/100ML; MG/100ML; MG/100ML; MG/100ML
INJECTION, SOLUTION INTRAVENOUS CONTINUOUS
Status: DISCONTINUED | OUTPATIENT
Start: 2022-05-24 | End: 2022-05-27 | Stop reason: HOSPADM

## 2022-05-24 RX ORDER — FENTANYL CITRATE 50 UG/ML
50 INJECTION, SOLUTION INTRAMUSCULAR; INTRAVENOUS EVERY 10 MIN PRN
Status: CANCELLED | OUTPATIENT
Start: 2022-05-24

## 2022-05-24 RX ORDER — MEPERIDINE HYDROCHLORIDE 25 MG/ML
12.5 INJECTION INTRAMUSCULAR; INTRAVENOUS; SUBCUTANEOUS
Status: CANCELLED | OUTPATIENT
Start: 2022-05-24 | End: 2022-05-24

## 2022-05-24 RX ORDER — METOCLOPRAMIDE HYDROCHLORIDE 5 MG/ML
10 INJECTION INTRAMUSCULAR; INTRAVENOUS
Status: CANCELLED | OUTPATIENT
Start: 2022-05-24 | End: 2022-05-24

## 2022-05-24 RX ADMIN — PROPOFOL 70 MCG/KG/MIN: 10 INJECTION, EMULSION INTRAVENOUS at 13:12

## 2022-05-24 RX ADMIN — MIDAZOLAM HYDROCHLORIDE 2 MG: 1 INJECTION, SOLUTION INTRAMUSCULAR; INTRAVENOUS at 13:12

## 2022-05-24 RX ADMIN — MIDAZOLAM HYDROCHLORIDE 2 MG: 1 INJECTION, SOLUTION INTRAMUSCULAR; INTRAVENOUS at 13:10

## 2022-05-24 RX ADMIN — SODIUM CHLORIDE, POTASSIUM CHLORIDE, SODIUM LACTATE AND CALCIUM CHLORIDE: 600; 310; 30; 20 INJECTION, SOLUTION INTRAVENOUS at 11:50

## 2022-05-24 RX ADMIN — SODIUM CHLORIDE, POTASSIUM CHLORIDE, SODIUM LACTATE AND CALCIUM CHLORIDE: 600; 310; 30; 20 INJECTION, SOLUTION INTRAVENOUS at 13:10

## 2022-05-24 RX ADMIN — EPHEDRINE SULFATE 10 MG: 50 INJECTION, SOLUTION INTRAVENOUS at 13:47

## 2022-05-24 ASSESSMENT — PAIN - FUNCTIONAL ASSESSMENT
PAIN_FUNCTIONAL_ASSESSMENT: PREVENTS OR INTERFERES SOME ACTIVE ACTIVITIES AND ADLS
PAIN_FUNCTIONAL_ASSESSMENT: 0-10

## 2022-05-24 ASSESSMENT — PAIN DESCRIPTION - DESCRIPTORS: DESCRIPTORS: OTHER (COMMENT)

## 2022-05-24 NOTE — ANESTHESIA POSTPROCEDURE EVALUATION
Department of Anesthesiology  Postprocedure Note    Patient: Aura Gil  MRN: [id number]  YOB: 1970  Date of evaluation: 5/24/2022  Time:  2:12 PM     Procedure Summary     Date: 05/24/22 Room / Location: [facility]    Anesthesia Start: 1310 Anesthesia Stop: 6136    Procedure: MRI LUMBAR SPINE WO CONTRAST Diagnosis:       Chronic low back pain, unspecified back pain laterality, unspecified whether sciatica present      (back pain)    Scheduled Providers:  Responsible Provider: Claudene Mc, MD    Anesthesia Type: MAC ASA Status: 2          Anesthesia Type: No value filed. Christophe Phase I:      Christophe Phase II:      Last vitals: Reviewed and per EMR flowsheets.        Anesthesia Post Evaluation    Patient location during evaluation: bedside  Patient participation: complete - patient participated  Level of consciousness: awake and awake and alert  Pain score: 0  Airway patency: patent  Nausea & Vomiting: no nausea and no vomiting  Complications: no  Cardiovascular status: blood pressure returned to baseline and hemodynamically stable  Respiratory status: acceptable  Hydration status: euvolemic

## 2022-05-24 NOTE — ANESTHESIA PRE PROCEDURE
Department of Anesthesiology  Preprocedure Note       Name:  Juani Chowdary   Age:  45 y.o.  :  1970                                          MRN:  [id number]         Date:  2022      Surgeon: * No surgeons listed *    Procedure: * No procedures listed *    Medications prior to admission:   Prior to Admission medications    Medication Sig Start Date End Date Taking? Authorizing Provider   HYDROcodone-acetaminophen (NORCO) 5-325 MG per tablet Take 1 tablet by mouth daily as needed for Pain for up to 30 days. 22  Paco Young MD   naloxone 4 MG/0.1ML LIQD nasal spray 1 spray by Nasal route as needed for Opioid Reversal 22   Paco Young MD   lidocaine (LMX) 4 % cream Apply a half dollar sized amount to intact skin topically up to twice daily as needed for pain 22   Paco Young MD   amitriptyline (ELAVIL) 10 MG tablet Take 1 tablet by mouth nightly 22   Linda Reynoso MD   levothyroxine (SYNTHROID) 100 MCG tablet Take 1 tablet by mouth daily 3/31/22   Linda Reynoso MD   nitrofurantoin (MACRODANTIN) 50 MG capsule Oral: 50 mg as a single dose taken within 2 hours of sexual intercourse 22   Linda Reynoso MD   fluticasone (FLONASE) 50 MCG/ACT nasal spray 1 spray by Each Nostril route daily as needed     Historical Provider, MD   aspirin 81 MG tablet Take 81 mg by mouth Takes 2 tablets daily    Historical Provider, MD       Current medications:    Current Outpatient Medications   Medication Sig Dispense Refill    HYDROcodone-acetaminophen (NORCO) 5-325 MG per tablet Take 1 tablet by mouth daily as needed for Pain for up to 30 days.  10 tablet 0    naloxone 4 MG/0.1ML LIQD nasal spray 1 spray by Nasal route as needed for Opioid Reversal 1 each 0    lidocaine (LMX) 4 % cream Apply a half dollar sized amount to intact skin topically up to twice daily as needed for pain 45 g 1    amitriptyline (ELAVIL) 10 MG tablet Take 1 tablet by mouth nightly 30 tablet 0    levothyroxine (SYNTHROID) 100 MCG tablet Take 1 tablet by mouth daily 90 tablet 3    nitrofurantoin (MACRODANTIN) 50 MG capsule Oral: 50 mg as a single dose taken within 2 hours of sexual intercourse 90 capsule 1    fluticasone (FLONASE) 50 MCG/ACT nasal spray 1 spray by Each Nostril route daily as needed       aspirin 81 MG tablet Take 81 mg by mouth Takes 2 tablets daily       Current Facility-Administered Medications   Medication Dose Route Frequency Provider Last Rate Last Admin    lactated ringers infusion   IntraVENous Continuous Luis Lei,  mL/hr at 05/24/22 1150 New Bag at 05/24/22 1150       Allergies:     Allergies   Allergen Reactions    Amoxicillin Hives    Nsaids Other (See Comments)     STROKE/NO NSAIDS ALLOWED    Pcn [Penicillins] Rash       Problem List:    Patient Active Problem List   Diagnosis Code    Full incontinence of feces R15.9    Cerebrovascular accident (CVA) ([facility]) I63.9    Depression with anxiety F41.8    Hypothyroidism E03.9    Iron deficiency anemia D50.9    Prediabetes R73.03    Right hip impingement syndrome M25.851    Rosacea L71.9    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 THOMAS [facility] x10 yrs    COLONOSCOPY  03/04/2019    KERRY AMADO MD    DILATION AND CURETTAGE      ENDOMETRIAL ABLATION      TONSILLECTOMY         Social History:    Social History     Tobacco Use    Smoking status: Former Smoker     Packs/day: Applicable): No results found for: PREGTESTUR, PREGSERUM, HCG, HCGQUANT     ABGs: No results found for: PHART, PO2ART, PJJ8GLA, WLW7EVO, BEART, H0GUPJSL     Type & Screen (If Applicable):  No results found for: LABABO, LABRH    Drug/Infectious Status (If Applicable):  No results found for: HIV, HEPCAB    COVID-19 Screening (If Applicable):   Lab Results   Component Value Date    COVID19 Not Detected 09/17/2021           Anesthesia Evaluation  Patient summary reviewed and Nursing notes reviewed no history of anesthetic complications:   Airway: Mallampati: II  TM distance: >3 FB   Neck ROM: full  Mouth opening: > = 3 FB   Dental: normal exam         Pulmonary:Negative Pulmonary ROS and normal exam                               Cardiovascular:Negative CV ROS          ECG reviewed                        Neuro/Psych:   (+) CVA:,             GI/Hepatic/Renal: Neg GI/Hepatic/Renal ROS            Endo/Other:    (+) hypothyroidism::., .                 Abdominal:             Vascular: negative vascular ROS. Other Findings:           Anesthesia Plan      MAC     ASA 2       Induction: intravenous. MIPS: Prophylactic antiemetics administered. Plan discussed with CRNA.                     Suleiman De Paz MD   5/24/2022

## 2022-05-25 PROCEDURE — 95816 EEG AWAKE AND DROWSY: CPT | Performed by: PSYCHIATRY & NEUROLOGY

## 2022-05-25 NOTE — PROCEDURES
[address]                      [facility], [address] [facility]                          ELECTROENCEPHALOGRAM REPORT    PATIENT NAME: Renee Humphries                   :        1970  MED REC NO:   [id number]                            ROOM:  ACCOUNT NO:   [de-identified]                           ADMIT DATE: 2022  PROVIDER:     Chandana Lopez MD    DATE OF EE2022    EEG FINDINGS:  This is a spontaneous 21-channel EEG recording for this  patient with questionable memory loss. The background rhythm of this  EEG shows a well-regulated 8-9 Hz posterior dominant rhythm of alpha. This is symmetrical and attenuates with eye opening. EKG is monitored,  this is regular. Photic stimulation is performed without any photic  responses. There is no photo response to seizures. Hyperventilation is  performed with good effort without any change in frequencies. Record  remains symmetrical throughout without any asymmetries or epileptiform  discharge. IMPRESSION:  This is a normal very well-regulated EEG recording. No  definite epileptiform discharge or seizures are noted. Clinical  correlation is recommended.         Katie Lozano MD    D: 2022 12:19:53       T: 2022 12:22:05     [email or web address]  Job#: [id number]     Doc#: [id number]    CC:

## 2022-06-23 ENCOUNTER — TELEPHONE (OUTPATIENT)
Dept: PAIN MANAGEMENT | Age: 52
End: 2022-06-23

## 2022-06-23 ENCOUNTER — OFFICE VISIT (OUTPATIENT)
Dept: PAIN MANAGEMENT | Age: 52
End: 2022-06-23
Payer: COMMERCIAL

## 2022-06-23 VITALS
BODY MASS INDEX: 23.99 KG/M2 | WEIGHT: 144 LBS | TEMPERATURE: 96.8 F | DIASTOLIC BLOOD PRESSURE: 82 MMHG | SYSTOLIC BLOOD PRESSURE: 128 MMHG | HEIGHT: 65 IN

## 2022-06-23 DIAGNOSIS — M79.7 FIBROMYALGIA: ICD-10-CM

## 2022-06-23 DIAGNOSIS — M17.11 PRIMARY OSTEOARTHRITIS OF RIGHT KNEE: ICD-10-CM

## 2022-06-23 DIAGNOSIS — M47.817 LUMBOSACRAL SPONDYLOSIS WITHOUT MYELOPATHY: Primary | ICD-10-CM

## 2022-06-23 DIAGNOSIS — M25.50 POLYARTHRALGIA: ICD-10-CM

## 2022-06-23 PROCEDURE — 99215 OFFICE O/P EST HI 40 MIN: CPT | Performed by: NURSE PRACTITIONER

## 2022-06-23 ASSESSMENT — ENCOUNTER SYMPTOMS
BACK PAIN: 1
CONSTIPATION: 0
DIARRHEA: 0
RESPIRATORY NEGATIVE: 1

## 2022-06-23 NOTE — TELEPHONE ENCOUNTER
ORDER PLACED:    Date: 6/23/22  Description: BILAT L3-4, L4-5, L5-S1 FJI  Order Number: [id number]  Ordering Provider: [facility]  Performing Provider: Elizabeth Parsons  CPT Codes: 47026, 12193, Kitty Hinojosa  ICD10 Codes: N21.967

## 2022-06-23 NOTE — PROGRESS NOTES
Patient: Juani Chowdary  YOB: 1970  Date: 22        Subjective:     Juani Chowdary is a 45 y.o. female who complains today of:    Chief Complaint   Patient presents with    Back Pain    Neck Pain    Wrist Pain    Foot Pain    Knee Pain         Allergies:  Amoxicillin, Nsaids, and Pcn [penicillins]    Past Medical History:   Diagnosis Date    Abnormal Pap smear of cervix     Depression     Depression with anxiety     Depression with anxiety     Iron deficiency anemia 2019    Stroke ([address] 2016    Idiopathic she is still in process     Thyroid disease      Past Surgical History:   Procedure Laterality Date    BLADDER SURGERY      done by Dr. THOMAS [facility] x10 yrs    COLONOSCOPY  2019    KERRY AMADO MD    DILATION AND CURETTAGE      ENDOMETRIAL ABLATION      TONSILLECTOMY       History reviewed. No pertinent family history.   Social History     Socioeconomic History    Marital status:      Spouse name: Not on file    Number of children: Not on file    Years of education: Not on file    Highest education level: Not on file   Occupational History    Not on file   Tobacco Use    Smoking status: Former Smoker     Packs/day: 0.25     Years: 3.00     Pack years: 0.75     Types: Cigarettes     Start date: 1988     Quit date: 1990     Years since quittin.9    Smokeless tobacco: Never Used   Vaping Use    Vaping Use: Never used   Substance and Sexual Activity    Alcohol use: Yes     Comment: ocassionally    Drug use: No    Sexual activity: Not Currently   Other Topics Concern    Not on file   Social History Narrative    Not on file     Social Determinants of Health     Financial Resource Strain: Low Risk     Difficulty of Paying Living Expenses: Not hard at all   Food Insecurity: No Food Insecurity    Worried About [address] [employer] in the Last Year: Never true    [place] of Food in the Last Year: Never true   Transportation Needs:     Lack of Transportation (Medical): Not on file    Lack of Transportation (Non-Medical): Not on file   Physical Activity:     Days of Exercise per Week: Not on file    Minutes of Exercise per Session: Not on file   Stress:     Feeling of Stress : Not on file   Social Connections:     Frequency of Communication with Friends and Family: Not on file    Frequency of Social Gatherings with Friends and Family: Not on file    Attends Gnosticist Services: Not on file    Active Member of [address] [place] [address] [employer] or Organizations: Not on file    Attends Club or Organization Meetings: Not on file    Marital Status: Not on file   Intimate Partner Violence:     Fear of Current or Ex-Partner: Not on file    Emotionally Abused: Not on file    Physically Abused: Not on file    Sexually Abused: Not on file   Housing Stability:     Unable to Pay for Housing in the Last Year: Not on file    Number of [place] in the Last Year: Not on file    Unstable Housing in the Last Year: Not on file       Current Outpatient Medications on File Prior to Visit   Medication Sig Dispense Refill    naloxone 4 MG/0.1ML LIQD nasal spray 1 spray by Nasal route as needed for Opioid Reversal 1 each 0    lidocaine (LMX) 4 % cream Apply a half dollar sized amount to intact skin topically up to twice daily as needed for pain 45 g 1    levothyroxine (SYNTHROID) 100 MCG tablet Take 1 tablet by mouth daily 90 tablet 3    nitrofurantoin (MACRODANTIN) 50 MG capsule Oral: 50 mg as a single dose taken within 2 hours of sexual intercourse 90 capsule 1    fluticasone (FLONASE) 50 MCG/ACT nasal spray 1 spray by Each Nostril route daily as needed       aspirin 81 MG tablet Take 81 mg by mouth Takes 2 tablets daily       No current facility-administered medications on file prior to visit. 66-year-old female here today to follow-up chronic pain due to fibromyalgia. She had initial consult with Dr. Lashay Hernandez on 5/19/2022 at the request of her family physician.   She was chiropractor.      MRI Brain 3/22/22: no changes, no ischemia or hemorrhage  XR LS Spine 8/26/21: scoliosis, lumbarization S1 with 6 lumbar like vertebrae. degenerative disc disease and facet arthropathy. SI joints maintained  XR R Knee 8/26/21: no fracture, narrowing medial compartments. XR T Hip 8/26/21: SI joints maintained, mild joint space narrowing left hip, no fracture. MRI LS Spine 5/28/16: degenerative changes L4/5. MRI LS Spine otherwise essentially negative  MRI C Spine 9/22/12: no sig spinal canal stenosis, foramen stenosis not severe. Assessment:        Diagnosis Orders   1. Lumbosacral spondylosis without myelopathy  WA INJ DX/THER AGNT PARAVERT FACET JOINT, LUMBAR/SAC, 1ST LEVEL    WA INJ DX/THER AGNT PARAVERT FACET JOINT, LUMBAR/SAC, 2ND LEVEL    WA INJ DX/THER AGNT PARAVERT FACET JOINT, LUMBAR/SAC, ADD LEVEL   2. Primary osteoarthritis of right knee  WA ARTHROCENTESIS ASPIR&/INJ MAJOR JT/BURSA W/O US    CHG FLUOROSCOPIC GUIDANCE NEEDLE PLACEMENT ADD ON   3. Polyarthralgia     4. Fibromyalgia         Plan:          No orders of the defined types were placed in this encounter.       Orders Placed This Encounter   Procedures    CHG FLUOROSCOPIC GUIDANCE NEEDLE PLACEMENT ADD ON     Standing Status:   Future     Standing Expiration Date:   9/21/2022    WA INJ DX/THER AGNT PARAVERT FACET JOINT, LUMBAR/SAC, 1ST LEVEL     Bilateral facet L3-4,4-5, 5-S1 with SM     Standing Status:   Future     Standing Expiration Date:   9/21/2022    WA INJ DX/THER AGNT PARAVERT FACET JOINT, LUMBAR/SAC, 2ND LEVEL     Standing Status:   Future     Standing Expiration Date:   9/21/2022    WA INJ DX/THER AGNT PARAVERT FACET JOINT, LUMBAR/SAC, ADD LEVEL     Standing Status:   Future     Standing Expiration Date:   9/21/2022    WA ARTHROCENTESIS ASPIR&/INJ MAJOR JT/BURSA W/O US     Knee gel with SM     Standing Status:   Future     Standing Expiration Date:   9/21/2022     -Failed conservative treatment to right knee we will go ahead and order gel injection  -We discussed the options in detail today. We will order bilateral facet joint injections. Conservative treatment, including anti-inflammatories, has been tried and failed. Anatomic model of pathology was shown. Risks and benefits of the procedure were discussed. All questions were answered and patient understands and agrees with the plan. -She has enough Norco and will try it again  -We will stop amitriptyline due to side effects of dizziness and nausea  -She has tried Cymbalta in the past.  She does not like taking medications.  -She has had rheumatology consults and diagnosed with fibromyalgia  -She does not work and is concerned about her arthritis worsening. We discussed the nature of osteoarthritis and lifestyle changes. Discussed options with the patient today. Anatomic model pathology was shown and reviewed with pt. All questions were answered. Relevant imaging reviewed, NDP reviewed and pain generators reviewed. Pt verbalized understanding and agrees with above plan. Will continue medications as they do help pt function with ADL and improve quality of life. Pt understands potential side effects from opioids such as constipation, dry mouth, dizziness, opioid use disorder, and overdose. Pt has failed non opioid therapy and decision was made to prescribe opioids to help with daily function and improve quality of life. Discussed the principles of opioid tolerance as well as the concerns regarding opioid diversion, misuse, and abuse. Discussed the risks of respiratory depression and death while on pain medications, especially when combined with other sedative substances. Discussed the elevated risks of excessive sedation while on pain medications. Advised him against driving or operating heavy machinery or performing any activities where he may harm himself or others while on pain medications.  Particular caution was emphasized especially during dose

## 2022-06-23 NOTE — TELEPHONE ENCOUNTER
BENEFITS:    Insurance: Jamison Lindquist  Phone: [phone]  Contact Name: Kenny Ritter  Effective Date: 10/5/21     Plan year: Carl  Deductible:       Deductible Met:   Allowed/benefits paid at: 100% AFTER $150 COPAY  OOP: $6,500.00, $993.43 MET  Freq Limits: MED NEC  Prior Auth Requirement: NONE    Notes:    Reference #: [id number]    Time of call: 326P

## 2022-06-23 NOTE — TELEPHONE ENCOUNTER
BILAT L3-4, 4-5, 5-S1 FJI    NO AUTH REQUIRED    OK to schedule procedure approved as above. Please note sides/levels approved and date range. (If applicable, sides/levels approved may differ from those ordered)    TO BE SCHEDULED WITH DR. Jazmine Ibarra

## 2022-06-23 NOTE — TELEPHONE ENCOUNTER
BENEFITS:    Insurance: Neelam Moyer  Phone: [phone]  Contact Name: Claudetta Cannon  Effective Date: 10/5/21     Plan year: Carl  Deductible:       Deductible Met:   Allowed/benefits paid at: 100% AFTER $150 COPAY  OOP: $6,500.00, $993.43 MET  Freq Limits: MED NEC  Prior Auth Requirement: NONE    Notes:     Call Reference #: [id number]    Time of call: [phone]

## 2022-06-23 NOTE — TELEPHONE ENCOUNTER
RIGHT KNEE DUROLANE    NO AUTH REQUIRED    OK to schedule procedure approved as above. Please note sides/levels approved and date range. (If applicable, sides/levels approved may differ from those ordered)    TO BE SCHEDULED WITH DR. Jennifer Mathews

## 2022-06-23 NOTE — TELEPHONE ENCOUNTER
ORDER PLACED:    Date: 6/23/22  Description: RIGHT KNEE GEL INJ  Order Number: [id number]  Ordering Provider: Ariel Zheng  Performing Provider: Dorys Aparicio  CPT Codes: 88545, (DUROLANE)  ICD10 Codes: M17.11

## 2022-06-24 NOTE — TELEPHONE ENCOUNTER
I called patient to schedule. She is requesting to know how much she will need to pay for both her knee and back injections. Is this something our precert team can let the patient know or whom does she need to contact for this? Please call patient back to let her know. Thanks.

## 2022-06-27 NOTE — TELEPHONE ENCOUNTER
LEFT VOICEMAIL FOR PATIENT TO SCHEDULE PROCEDURE    (SM- RIGHT KNEE DUROLANE INJ -- NO AUTH REQUIRED)       PER BENEFIT CALL FROM 6/23/22 KNEE INJECTION IS PAID % AFTER A $150 COPAY.

## 2022-06-27 NOTE — TELEPHONE ENCOUNTER
LEFT VOICEMAIL FOR THE PATIENT TO SCHEDULE PROCEDURE    (SM- BILAT L3-4, L4-5, L5-S1 FJI -- NO AUTH REQUIRED)         PER BENEFIT CALL ON 6/23/22 PROCEDURE WILL BE COVERED % AFTER $150 COPAY.

## 2022-07-19 ENCOUNTER — OFFICE VISIT (OUTPATIENT)
Dept: OBGYN CLINIC | Age: 52
End: 2022-07-19
Payer: COMMERCIAL

## 2022-07-19 VITALS
HEIGHT: 65 IN | WEIGHT: 143 LBS | BODY MASS INDEX: 23.82 KG/M2 | SYSTOLIC BLOOD PRESSURE: 116 MMHG | DIASTOLIC BLOOD PRESSURE: 70 MMHG

## 2022-07-19 DIAGNOSIS — Z01.419 ENCOUNTER FOR WELL WOMAN EXAM WITH ROUTINE GYNECOLOGICAL EXAM: Primary | ICD-10-CM

## 2022-07-19 DIAGNOSIS — Z11.51 SCREENING FOR HUMAN PAPILLOMAVIRUS: ICD-10-CM

## 2022-07-19 DIAGNOSIS — Z12.31 ENCOUNTER FOR SCREENING MAMMOGRAM FOR MALIGNANT NEOPLASM OF BREAST: ICD-10-CM

## 2022-07-19 PROCEDURE — 99396 PREV VISIT EST AGE 40-64: CPT | Performed by: OBSTETRICS & GYNECOLOGY

## 2022-07-20 ASSESSMENT — ENCOUNTER SYMPTOMS
NAUSEA: 0
ABDOMINAL DISTENTION: 0
DIARRHEA: 0
COUGH: 0
ABDOMINAL PAIN: 0
BLOOD IN STOOL: 0
SHORTNESS OF BREATH: 0
VOMITING: 0
SORE THROAT: 0
WHEEZING: 0
CONSTIPATION: 0

## 2022-07-20 NOTE — PROGRESS NOTES
Postmenopausal Annual     Elder Ortiz is a 45y.o. year old  Kayla Ates female who presents today for her annual well woman exam.  The patient is sexually active. The patient has never been taking hormone replacement therapy. Patient denies post-menopausal vaginal bleeding. The patient has regular exercise: yes    Vitals:  /70   Ht 5' 5\" (1.651 m)   Wt 143 lb (64.9 kg)   LMP 2019 (Approximate)   BMI 23.80 kg/m²   Allergies:  Amoxicillin, Nsaids, and Pcn [penicillins]  Past Medical History:   Diagnosis Date    Abnormal Pap smear of cervix     Depression     Depression with anxiety     Depression with anxiety     Iron deficiency anemia 2019    Stroke ([address] 2016    Idiopathic she is still in process     Thyroid disease      Past Surgical History:   Procedure Laterality Date    BLADDER SURGERY      done by Dr. THOMAS [facility] x10 yrs    COLONOSCOPY  2019    KERRY AMADO MD    DILATION AND CURETTAGE      ENDOMETRIAL ABLATION      TONSILLECTOMY       No family history on file.   Social History     Socioeconomic History    Marital status:      Spouse name: Not on file    Number of children: Not on file    Years of education: Not on file    Highest education level: Not on file   Occupational History    Not on file   Tobacco Use    Smoking status: Former     Packs/day: 0.25     Years: 3.00     Pack years: 0.75     Types: Cigarettes     Start date: 1988     Quit date: 1990     Years since quittin.0    Smokeless tobacco: Never   Vaping Use    Vaping Use: Never used   Substance and Sexual Activity    Alcohol use: Yes     Comment: ocassionally    Drug use: No    Sexual activity: Not Currently   Other Topics Concern    Not on file   Social History Narrative    Not on file     Social Determinants of Health     Financial Resource Strain: Low Risk     Difficulty of Paying Living Expenses: Not hard at all   Food Insecurity: No Food Insecurity    Worried About [employer] in the Last Year: Never true    Ran Out of Food in the Last Year: Never true   Transportation Needs: Not on file   Physical Activity: Not on file   Stress: Not on file   Social Connections: Not on file   Intimate Partner Violence: Not on file   Housing Stability: Not on file       Last mammogram 2021  Breast cancer risk factors : no known risk  Last Pap 2021    Patient's last menstrual period was 03/18/2019 (approximate). Age at menopause onset: 1yrs  Menopausal symptom assessment: none  Urinary incontinence & GUsymptoms: none  Sexual dysfunction: none  Present Hormonal medications: none    Osteoporosis risk assessment : Small body frame  Last bone mineral density : never    History of abnormal lipids:no  Hypertension no  Stroke/MI no    Yearly flu vaccine recommended for persons aged 48 and older. Colonoscopyup-to-date    Review of Systems  Review of Systems   Constitutional:  Negative for activity change, appetite change, fatigue and unexpected weight change. HENT:  Negative for nosebleeds and sore throat. Eyes:  Negative for visual disturbance. Respiratory:  Negative for cough, shortness of breath and wheezing. Cardiovascular:  Negative for chest pain, palpitations and leg swelling. Gastrointestinal:  Negative for abdominal distention, abdominal pain, blood in stool, constipation, diarrhea, nausea and vomiting. Endocrine: Negative for cold intolerance, heat intolerance, polydipsia and polyuria. Genitourinary:  Negative for difficulty urinating, dyspareunia, dysuria, frequency, genital sores, hematuria, pelvic pain, urgency, vaginal bleeding, vaginal discharge and vaginal pain. Musculoskeletal:  Negative for arthralgias. Skin:  Negative for rash. Neurological:  Negative for dizziness, weakness, light-headedness and headaches. Hematological:  Negative for adenopathy. Does not bruise/bleed easily. Psychiatric/Behavioral:  Negative for confusion and sleep disturbance.     All other systems reviewed and are negative. Objective:     Vitals:  /70   Ht 5' 5\" (1.651 m)   Wt 143 lb (64.9 kg)   LMP 03/18/2019 (Approximate)   BMI 23.80 kg/m²     Physical Exam  Physical Exam  Constitutional:       General: She is not in acute distress. Appearance: She is well-developed. She is not diaphoretic. HENT:      Head: Normocephalic. Eyes:      Conjunctiva/sclera: Conjunctivae normal.      Pupils: Pupils are equal, round, and reactive to light. Neck:      Thyroid: No thyromegaly. Trachea: No tracheal deviation. Cardiovascular:      Rate and Rhythm: Normal rate and regular rhythm. Heart sounds: Normal heart sounds. No murmur heard. No friction rub. No gallop. Pulmonary:      Effort: Pulmonary effort is normal. No respiratory distress. Breath sounds: Normal breath sounds. No wheezing or rales. Chest:      Chest wall: No tenderness. Abdominal:      General: Bowel sounds are normal. There is no distension. Palpations: Abdomen is soft. There is no mass. Tenderness: There is no abdominal tenderness. There is no guarding or rebound. Genitourinary:     Labia:         Right: No rash, tenderness or lesion. Left: No rash, tenderness or lesion. Vagina: Normal. No vaginal discharge, erythema, tenderness or bleeding. Cervix: No cervical motion tenderness, discharge or friability. Uterus: Not deviated, not enlarged, not fixed and not tender. Adnexa:         Right: No mass, tenderness or fullness. Left: No mass, tenderness or fullness. Comments: Uterus is not prolapsed and there is not a cystocele or rectocele noted  Musculoskeletal:      Right lower leg: No edema. Left lower leg: No edema. Skin:     General: Skin is warm and dry. Neurological:      Mental Status: She is alert and oriented to person, place, and time. Cranial Nerves: No cranial nerve deficit.       Deep Tendon Reflexes: Reflexes normal.   Psychiatric: Behavior: Behavior normal.         Judgment: Judgment normal.       Assessment:      Diagnosis Orders   1. Encounter for well woman exam with routine gynecological exam  PAP SMEAR    [facility] DIGITAL SCREEN W OR WO CAD BILATERAL      2. Screening for human papillomavirus  PAP SMEAR      3. Encounter for screening mammogram for malignant neoplasm of breast  [facility] DIGITAL SCREEN W OR WO CAD BILATERAL          Body mass index is 23.8 kg/m². Obesity:  Normal weight  Smoking:  No    Plan:   Pap : indicated:  performed. Obesity Counseling:  N/A  Smoking Counseling:  N/A    Orders Placed This Encounter   Procedures    [facility] DIGITAL SCREEN W OR WO CAD BILATERAL     Standing Status:   Future     Standing Expiration Date:   7/19/2023    PAP SMEAR     Standing Status:   Future     Standing Expiration Date:   7/19/2023     Order Specific Question:   Collection Type     Answer: Thin Prep     Order Specific Question:   Prior Abnormal Pap Test     Answer:   No     Order Specific Question:   Screening or Diagnostic     Answer:   Screening     Order Specific Question:   HPV Requested? Answer:   Yes     Comments:   16/18     Order Specific Question:   High Risk Patient     Answer:   N/A     No orders of the defined types were placed in this encounter. Follow up:  Return in about 2 years (around 7/19/2024) for annual examination. Kimmy Standard, DO    HEALTH MAINTENANCE:   Health information given. Women's Health patient information and counselling done. Counseled regarding risk/benefits/alternatives to Hormone Therapyand need for yearly re-evaluation. Periodic Pap smear discussed. Mammogram recommended yearly. Colon cancer screening by age 48 & every 5-10 years. Bone mineral density (by age 72 or sooner if indication itwould affect Hormone Therapy management or other risk factors for osteoporosis).

## 2022-07-26 ENCOUNTER — OFFICE VISIT (OUTPATIENT)
Dept: NEUROLOGY | Age: 52
End: 2022-07-26
Payer: COMMERCIAL

## 2022-07-26 VITALS
BODY MASS INDEX: 23.96 KG/M2 | SYSTOLIC BLOOD PRESSURE: 124 MMHG | HEART RATE: 78 BPM | WEIGHT: 144 LBS | DIASTOLIC BLOOD PRESSURE: 72 MMHG

## 2022-07-26 DIAGNOSIS — I63.9 CEREBROVASCULAR ACCIDENT (CVA), UNSPECIFIED MECHANISM (HCC): ICD-10-CM

## 2022-07-26 DIAGNOSIS — R41.3 MEMORY LOSS: Primary | ICD-10-CM

## 2022-07-26 PROCEDURE — 99214 OFFICE O/P EST MOD 30 MIN: CPT | Performed by: PSYCHIATRY & NEUROLOGY

## 2022-07-26 ASSESSMENT — ENCOUNTER SYMPTOMS
CHOKING: 0
PHOTOPHOBIA: 0
SHORTNESS OF BREATH: 0
BACK PAIN: 0
VOMITING: 0
COLOR CHANGE: 0
TROUBLE SWALLOWING: 0
NAUSEA: 0

## 2022-07-26 NOTE — PROGRESS NOTES
Subjective:      Patient ID: Nirmala Guerrier is a 45 y.o. female who presents today for:  Chief Complaint   Patient presents with    Follow-up     Would like to go over results from EEG. Did see Dr. Johana Carter and we are getting that report. Pt has not noticed anything getting worse or better at this time. HPI 45 right-handed female who we had seen for memory loss. We are further referred her to Dr. Johana Carter reports he had a complete neuropsychometric evaluation and cognitive issues appear to be related to underlying chronic pain sleep deprivation fibromyalgia and other things. These findings be most history of a pseudodementia of depression than true organic dementia. As noted patient's MMSE was 26 when we had seen her.   She appears to still be very functional  Followed by pain management and only symptom she reports is some twitching of the eyelid which is again a myokymia of not able to rest  Past Medical History:   Diagnosis Date    Abnormal Pap smear of cervix     Depression     Depression with anxiety     Depression with anxiety     Iron deficiency anemia 2019    Stroke ([address] 2016    Idiopathic she is still in process     Thyroid disease      Past Surgical History:   Procedure Laterality Date    BLADDER SURGERY      done by Dr. THOMAS [facility] x10 yrs    COLONOSCOPY  2019    KERRY AMADO MD    DILATION AND CURETTAGE      ENDOMETRIAL ABLATION      TONSILLECTOMY       Social History     Socioeconomic History    Marital status:      Spouse name: Not on file    Number of children: Not on file    Years of education: Not on file    Highest education level: Not on file   Occupational History    Not on file   Tobacco Use    Smoking status: Former     Packs/day: 0.25     Years: 3.00     Pack years: 0.75     Types: Cigarettes     Start date: 1988     Quit date: 1990     Years since quittin.0    Smokeless tobacco: Never   Vaping Use    Vaping Use: Never used   Substance and Sexual Activity Alcohol use: Yes     Comment: ocassionally    Drug use: No    Sexual activity: Not Currently   Other Topics Concern    Not on file   Social History Narrative    Not on file     Social Determinants of Health     Financial Resource Strain: Low Risk     Difficulty of Paying Living Expenses: Not hard at all   Food Insecurity: No Food Insecurity    Worried About Running Out of Food in the Last Year: Never true    Ran Out of Food in the Last Year: Never true   Transportation Needs: Not on file   Physical Activity: Not on file   Stress: Not on file   Social Connections: Not on file   Intimate Partner Violence: Not on file   Housing Stability: Not on file     No family history on file. Allergies   Allergen Reactions    Amoxicillin Hives    Nsaids Other (See Comments)     STROKE/NO NSAIDS ALLOWED    Pcn [Penicillins] Rash       Current Outpatient Medications   Medication Sig Dispense Refill    lidocaine (LMX) 4 % cream Apply a half dollar sized amount to intact skin topically up to twice daily as needed for pain 45 g 1    levothyroxine (SYNTHROID) 100 MCG tablet Take 1 tablet by mouth daily 90 tablet 3    nitrofurantoin (MACRODANTIN) 50 MG capsule Oral: 50 mg as a single dose taken within 2 hours of sexual intercourse 90 capsule 1    fluticasone (FLONASE) 50 MCG/ACT nasal spray 1 spray by Each Nostril route daily as needed       aspirin 81 MG tablet Take 81 mg by mouth Takes 2 tablets daily      naloxone 4 MG/0.1ML LIQD nasal spray 1 spray by Nasal route as needed for Opioid Reversal (Patient not taking: Reported on 7/26/2022) 1 each 0     No current facility-administered medications for this visit. Review of Systems   Constitutional:  Negative for fever. HENT:  Negative for ear pain, tinnitus and trouble swallowing. Eyes:  Negative for photophobia and visual disturbance. Respiratory:  Negative for choking and shortness of breath. Cardiovascular:  Negative for chest pain and palpitations. Gastrointestinal:  Negative for nausea and vomiting. Musculoskeletal:  Negative for back pain, gait problem, joint swelling, myalgias, neck pain and neck stiffness. Skin:  Negative for color change. Allergic/Immunologic: Negative for food allergies. Neurological:  Negative for dizziness, tremors, seizures, syncope, facial asymmetry, speech difficulty, weakness, light-headedness, numbness and headaches. Psychiatric/Behavioral:  Negative for behavioral problems, confusion, hallucinations and sleep disturbance. Objective:   /72 (Site: Right Upper Arm, Position: Sitting, Cuff Size: Medium Adult)   Pulse 78   Wt 144 lb (65.3 kg)   LMP 03/18/2019 (Approximate)   BMI 23.96 kg/m²     Physical Exam  Vitals reviewed. Eyes:      Pupils: Pupils are equal, round, and reactive to light. Cardiovascular:      Rate and Rhythm: Normal rate and regular rhythm. Heart sounds: No murmur heard. Pulmonary:      Effort: Pulmonary effort is normal.      Breath sounds: Normal breath sounds. Abdominal:      General: Bowel sounds are normal.   Musculoskeletal:         General: Normal range of motion. Cervical back: Normal range of motion. Skin:     General: Skin is warm. Neurological:      Mental Status: She is alert and oriented to person, place, and time. Cranial Nerves: No cranial nerve deficit. Sensory: No sensory deficit. Motor: No abnormal muscle tone. Coordination: Coordination normal.      Deep Tendon Reflexes: Reflexes are normal and symmetric. Babinski sign absent on the right side. Babinski sign absent on the left side.    Psychiatric:         Mood and Affect: Mood normal.       MRI LUMBAR SPINE WO CONTRAST    Result Date: 5/25/2022  EXAMINATION: MRI LUMBAR SPINE WO CONTRAST CLINICAL HISTORY: M54.50 Chronic low back pain, unspecified back pain laterality, unspecified whether sciatica present ICD10 COMPARISONS: NONE AVAILABLE TECHNIQUE: Multiplanar multisequence images of the lumbar spine were obtained without contrast. FINDINGS: Counting reference: Skull base  The spine is in anatomic alignment. There is no acute fracture. There is preservation of the vertebral body heights. There is intervertebral disc desiccation and disc space narrowing at every level. The bone marrow signal is within normal limits. The distal cord and conus medullaris are within normal limits. The cauda equina is unremarkable. There is no prevertebral soft tissue swelling. The visualized retroperitoneal structures are unremarkable. T12-L1: There is annular fissures without disc herniation, central canal narrowing, or neural foraminal narrowing. L1-2: There are annular fissures and a 2 mm symmetric disc bulge and mild bilateral facet arthrosis and mild ligamentum flavum hypertrophy producing mild central canal narrowing and mild bilateral neural foraminal narrowing. L2-3: There are annular fissures and a 2 mm symmetric disc bulge and mild bilateral facet arthrosis and mild ligamentum flavum hypertrophy producing mild central canal narrowing and mild bilateral neural foraminal narrowing. L3-4: There is a 3 mm symmetric disc bulge as well as moderate bilateral facet arthrosis and mild ligamentum flavum hypertrophy producing mild narrowing of the central canal. There is moderate bilateral neural foraminal narrowing. L4-5: There is a 2 mm symmetric disc bulge as well as severe bilateral facet arthrosis without ligamentum flavum hypertrophy. There is mild narrowing of central canal and mild bilateral neural foraminal narrowing. L5-S1: There is no disc herniation, central canal narrowing, or neural foraminal narrowing. The visualized portions of the sacrum and iliac bones are within normal limits. There is multilevel spondylosis of the lumbar spine including disc desiccation narrowing at each level as well as facet arthrosis.  There is is no significant narrowing of central canal or neural foramina. Lab Results   Component Value Date/Time    WBC 7.6 08/10/2021 10:05 AM    RBC 4.65 08/10/2021 10:05 AM    HGB 14.4 08/10/2021 10:05 AM    HCT 43.5 08/10/2021 10:05 AM    MCV 93.6 08/10/2021 10:05 AM    MCH 30.9 08/10/2021 10:05 AM    MCHC 33.0 08/10/2021 10:05 AM    RDW 14.1 08/10/2021 10:05 AM     08/10/2021 10:05 AM     Lab Results   Component Value Date/Time     08/26/2021 10:30 AM    K 4.3 08/26/2021 10:30 AM     08/26/2021 10:30 AM    CO2 29 08/26/2021 10:30 AM    BUN 11 08/26/2021 10:30 AM    CREATININE 0.69 08/26/2021 10:30 AM    GFRAA >60.0 08/26/2021 10:30 AM    LABGLOM >60.0 08/26/2021 10:30 AM    GLUCOSE 90 08/26/2021 10:30 AM    PROT 7.7 08/26/2021 10:30 AM    LABALBU 4.9 08/26/2021 10:30 AM    CALCIUM 9.9 08/26/2021 10:30 AM    BILITOT 0.4 08/26/2021 10:30 AM    ALKPHOS 65 08/26/2021 10:30 AM    AST 18 08/26/2021 10:30 AM    ALT 12 08/26/2021 10:30 AM     Lab Results   Component Value Date/Time    PROTIME 10.0 06/20/2016 07:22 AM    INR 0.9 06/20/2016 07:22 AM     Lab Results   Component Value Date/Time    TSH 0.677 04/05/2021 09:42 AM    ABBMHSFO28 607 05/29/2016 06:00 AM    FERRITIN 112.5 04/12/2019 07:08 PM    IRON 100 04/12/2019 07:08 PM    TIBC 314 04/12/2019 07:08 PM     Lab Results   Component Value Date/Time    TRIG 61 05/29/2020 08:35 AM     08/26/2021 10:30 AM    LDLCALC 111 08/26/2021 10:30 AM     Lab Results   Component Value Date/Time    LABAMPH Neg 06/20/2016 07:22 AM    BARBSCNU Neg 06/20/2016 07:22 AM    LABBENZ Neg 06/20/2016 07:22 AM    OPIATESCREENURINE Neg 06/20/2016 07:22 AM    PHENCYCLIDINESCREENURINE Neg 06/20/2016 07:22 AM    ETOH <10 05/27/2016 07:06 PM     No results found for: LITHIUM, DILFRTOT, VALPROATE    Assessment:       Diagnosis Orders   1. Memory loss        2. Cerebrovascular accident (CVA), unspecified mechanism ([facility] [address]        Memory loss without any other findings.   In March we had further obtained an MRI of the brain which is

## 2022-08-05 DIAGNOSIS — Z11.51 SCREENING FOR HUMAN PAPILLOMAVIRUS: ICD-10-CM

## 2022-08-05 DIAGNOSIS — Z01.419 ENCOUNTER FOR WELL WOMAN EXAM WITH ROUTINE GYNECOLOGICAL EXAM: ICD-10-CM

## 2022-08-11 ENCOUNTER — OFFICE VISIT (OUTPATIENT)
Dept: FAMILY MEDICINE CLINIC | Age: 52
End: 2022-08-11
Payer: COMMERCIAL

## 2022-08-11 VITALS
SYSTOLIC BLOOD PRESSURE: 124 MMHG | BODY MASS INDEX: 23.99 KG/M2 | TEMPERATURE: 97.8 F | HEIGHT: 65 IN | DIASTOLIC BLOOD PRESSURE: 64 MMHG | HEART RATE: 64 BPM | WEIGHT: 144 LBS | OXYGEN SATURATION: 99 %

## 2022-08-11 DIAGNOSIS — M79.7 FIBROMYALGIA: ICD-10-CM

## 2022-08-11 DIAGNOSIS — F51.01 PRIMARY INSOMNIA: ICD-10-CM

## 2022-08-11 DIAGNOSIS — M25.552 HIP PAIN, BILATERAL: Primary | ICD-10-CM

## 2022-08-11 DIAGNOSIS — M25.551 HIP PAIN, BILATERAL: Primary | ICD-10-CM

## 2022-08-11 PROCEDURE — 99214 OFFICE O/P EST MOD 30 MIN: CPT | Performed by: INTERNAL MEDICINE

## 2022-08-11 RX ORDER — HYDROCODONE BITARTRATE AND ACETAMINOPHEN 5; 325 MG/1; MG/1
1 TABLET ORAL DAILY PRN
COMMUNITY
End: 2022-10-27 | Stop reason: ALTCHOICE

## 2022-08-11 RX ORDER — ACETAMINOPHEN 500 MG
500 TABLET ORAL EVERY 6 HOURS PRN
COMMUNITY

## 2022-08-11 ASSESSMENT — PATIENT HEALTH QUESTIONNAIRE - PHQ9
3. TROUBLE FALLING OR STAYING ASLEEP: 1
9. THOUGHTS THAT YOU WOULD BE BETTER OFF DEAD, OR OF HURTING YOURSELF: 0
5. POOR APPETITE OR OVEREATING: 0
SUM OF ALL RESPONSES TO PHQ QUESTIONS 1-9: 9
7. TROUBLE CONCENTRATING ON THINGS, SUCH AS READING THE NEWSPAPER OR WATCHING TELEVISION: 2
SUM OF ALL RESPONSES TO PHQ QUESTIONS 1-9: 9
8. MOVING OR SPEAKING SO SLOWLY THAT OTHER PEOPLE COULD HAVE NOTICED. OR THE OPPOSITE, BEING SO FIGETY OR RESTLESS THAT YOU HAVE BEEN MOVING AROUND A LOT MORE THAN USUAL: 1
2. FEELING DOWN, DEPRESSED OR HOPELESS: 1
6. FEELING BAD ABOUT YOURSELF - OR THAT YOU ARE A FAILURE OR HAVE LET YOURSELF OR YOUR FAMILY DOWN: 0
1. LITTLE INTEREST OR PLEASURE IN DOING THINGS: 2
SUM OF ALL RESPONSES TO PHQ QUESTIONS 1-9: 9
SUM OF ALL RESPONSES TO PHQ9 QUESTIONS 1 & 2: 3
SUM OF ALL RESPONSES TO PHQ QUESTIONS 1-9: 9
10. IF YOU CHECKED OFF ANY PROBLEMS, HOW DIFFICULT HAVE THESE PROBLEMS MADE IT FOR YOU TO DO YOUR WORK, TAKE CARE OF THINGS AT HOME, OR GET ALONG WITH OTHER PEOPLE: 0
4. FEELING TIRED OR HAVING LITTLE ENERGY: 2

## 2022-08-11 ASSESSMENT — ENCOUNTER SYMPTOMS
BACK PAIN: 0
EYE PAIN: 0
SHORTNESS OF BREATH: 0
ABDOMINAL PAIN: 0

## 2022-08-11 NOTE — PROGRESS NOTES
Subjective:      Patient ID: Yuriy Neville is a 45 y.o. female who presents today with:  Chief Complaint   Patient presents with    Follow-up     3 month        HPI      Here for follow up   Past Medical History:   Diagnosis Date    Abnormal Pap smear of cervix     Depression     Depression with anxiety     Depression with anxiety     Iron deficiency anemia 2019    Stroke ([address] 2016    Idiopathic she is still in process     Thyroid disease      Past Surgical History:   Procedure Laterality Date    BLADDER SURGERY      done by Dr. THOMAS [facility] x10 yrs    COLONOSCOPY  2019    KERRY AMADO MD    DILATION AND CURETTAGE      ENDOMETRIAL ABLATION      TONSILLECTOMY       Social History     Socioeconomic History    Marital status:      Spouse name: Not on file    Number of children: Not on file    Years of education: Not on file    Highest education level: Not on file   Occupational History    Not on file   Tobacco Use    Smoking status: Former     Packs/day: 0.25     Years: 3.00     Pack years: 0.75     Types: Cigarettes     Start date: 1988     Quit date: 1990     Years since quittin.1    Smokeless tobacco: Never   Vaping Use    Vaping Use: Never used   Substance and Sexual Activity    Alcohol use: Yes     Comment: ocassionally    Drug use: No    Sexual activity: Not Currently   Other Topics Concern    Not on file   Social History Narrative    Not on file     Social Determinants of Health     Financial Resource Strain: Low Risk     Difficulty of Paying Living Expenses: Not hard at all   Food Insecurity: No Food Insecurity    Worried About Running Out of Food in the Last Year: Never true    Ran Out of Food in the Last Year: Never true   Transportation Needs: Not on file   Physical Activity: Not on file   Stress: Not on file   Social Connections: Not on file   Intimate Partner Violence: Not on file   Housing Stability: Not on file     Allergies   Allergen Reactions    Amitriptyline      Couldn't tolerate it     Amoxicillin Hives    Nsaids Other (See Comments)     STROKE/NO NSAIDS ALLOWED    Pcn [Penicillins] Rash     Current Outpatient Medications on File Prior to Visit   Medication Sig Dispense Refill    HYDROcodone-acetaminophen (NORCO) 5-325 MG per tablet Take 1 tablet by mouth daily as needed for Pain. acetaminophen (TYLENOL) 500 MG tablet Take 500 mg by mouth every 6 hours as needed for Pain      lidocaine (LMX) 4 % cream Apply a half dollar sized amount to intact skin topically up to twice daily as needed for pain 45 g 1    levothyroxine (SYNTHROID) 100 MCG tablet Take 1 tablet by mouth daily 90 tablet 3    nitrofurantoin (MACRODANTIN) 50 MG capsule Oral: 50 mg as a single dose taken within 2 hours of sexual intercourse 90 capsule 1    fluticasone (FLONASE) 50 MCG/ACT nasal spray 1 spray by Each Nostril route daily as needed       aspirin 81 MG tablet Take 81 mg by mouth Takes 2 tablets daily       No current facility-administered medications on file prior to visit. I have personally reviewed the ROS, PMH, PFH, and social history     Review of Systems   Constitutional:  Negative for chills and fever. HENT:  Negative for congestion. Eyes:  Negative for pain. Respiratory:  Negative for shortness of breath. Cardiovascular:  Negative for chest pain. Gastrointestinal:  Negative for abdominal pain. Genitourinary:  Negative for hematuria. Musculoskeletal:  Negative for back pain. Allergic/Immunologic: Negative for immunocompromised state. Neurological:  Negative for headaches. Psychiatric/Behavioral:  Negative for hallucinations. Objective:   /64   Pulse 64   Temp 97.8 °F (36.6 °C) (Temporal)   Ht 5' 5\" (1.651 m)   Wt 144 lb (65.3 kg)   LMP 03/18/2019 (Approximate)   SpO2 99%   BMI 23.96 kg/m²     Physical Exam  Constitutional:       General: She is not in acute distress. Appearance: Normal appearance.  She is not ill-appearing, toxic-appearing or diaphoretic. HENT:      Head: Normocephalic. Neck:      Vascular: No carotid bruit. Cardiovascular:      Rate and Rhythm: Normal rate and regular rhythm. Pulses: Normal pulses. Heart sounds: Normal heart sounds. No murmur heard. No friction rub. No gallop. Pulmonary:      Effort: Pulmonary effort is normal. No respiratory distress. Breath sounds: Normal breath sounds. No wheezing, rhonchi or rales. Abdominal:      General: Abdomen is flat. There is no distension. Palpations: Abdomen is soft. Tenderness: There is no abdominal tenderness. There is no right CVA tenderness, left CVA tenderness, guarding or rebound. Musculoskeletal:      Cervical back: Neck supple. Right lower leg: No edema. Left lower leg: No edema. Skin:     General: Skin is warm. Findings: No erythema or rash. Neurological:      Mental Status: She is alert. Psychiatric:         Mood and Affect: Mood normal.         Assessment:       Diagnosis Orders   1. Hip pain, bilateral  XR HIP BILATERAL W AP PELVIS (2 VIEWS)      2. Fibromyalgia        3. Primary insomnia              Plan:     vc.  Thyroid US in 12/2023, if stable, no further fu   Fu neuro  Rheum fu is likely going to prn  [facility] 08/2022  She is agreeable to following up with pain mgmt  if no clear cause see ortho. (wants to hold off)  Repeat neuro psch test in 1 to 2 years. For now she doesn't want any other pharmacotherapy options. Orders Placed This Encounter   Procedures    XR HIP BILATERAL W AP PELVIS (2 VIEWS)     Standing Status:   Future     Standing Expiration Date:   8/11/2023       No orders of the defined types were placed in this encounter. Reluctant to start new meds  Couldn't tolerate amitripyline  NO SI/HI   Genora Primas present for chaperoned exam.   If anything should change or worsen call ASAP, don't wait for next scheduled appointment. No follow-ups on file.       Henry Flowers MD

## 2022-08-15 NOTE — TELEPHONE ENCOUNTER
Patient called asking what exactly was being injected and is there any possbile side effects? (Head aches, nausea, weight gain, etc.. )

## 2022-08-17 ENCOUNTER — TELEPHONE (OUTPATIENT)
Dept: PAIN MANAGEMENT | Age: 52
End: 2022-08-17

## 2022-08-17 NOTE — TELEPHONE ENCOUNTER
Patient is asking what the medication is exactly that's used in BILAT L3-4, 4-5, 5-S1 FJI, if any, and also if there are any possible side effects including, headaches, weight gain, or nausea? She doesn't want to schedule this procedure until she knows this. Will Dr Young House please advise?

## 2022-08-18 NOTE — TELEPHONE ENCOUNTER
Lidocaine  Sodium bicarbonate  Betamethasone (steroid) - which may cause short term headaches. Rarely causes nausea. Regular and consistent use of steroids can lead to weight gain. Steroids are not required. If she is not interested in a steroid injection, consideration for Lumbar medial branch blocks may be given (no steroid required).

## 2022-08-22 NOTE — TELEPHONE ENCOUNTER
Medications to be injected include lidocaine, sodium bicarbonate, and betamethasone steroid. Side effects can include but are not limited to headaches, weight gain, nausea, vomiting, and allergic reaction to the medications used. Potential side effects to medications can be a lengthy and potentially frightening list.    Such concerns are usually best discussed in person. It would be very difficult for me to address all potential concerns and questions through messaging. Conversations about procedures and the potential risks, benefits, and alternatives are best made in person. Please let me know if she would like to schedule follow-up appointment to discuss this in more detail in person.

## 2022-08-23 NOTE — TELEPHONE ENCOUNTER
(SM- BILAT L3-4, L4-5, L5-S1 FJI -- NO AUTH REQUIRED)    LEFT MESSAGE FOR THE PATIENT TO PLEASE CONTACT THE OFFICE REGARDING DR MCALLISTER'S MESSAGE FOR THE INJECTION.

## 2022-09-01 ENCOUNTER — PATIENT MESSAGE (OUTPATIENT)
Dept: FAMILY MEDICINE CLINIC | Age: 52
End: 2022-09-01

## 2022-09-01 NOTE — TELEPHONE ENCOUNTER
From: Bladimir Officer  To: Dr. Vanita Varela  Sent: 9/1/2022 3:25 PM EDT  Subject: Medication     Im going to be flying soon. . concerned about feeling Claustrophobic and having anxiety Was wondering if you could call me in something to help just for the flights.  Thank you

## 2022-09-02 RX ORDER — HYDROXYZINE HYDROCHLORIDE 25 MG/1
TABLET, FILM COATED ORAL
Qty: 30 TABLET | Refills: 0 | Status: SHIPPED | OUTPATIENT
Start: 2022-09-02 | End: 2022-10-27 | Stop reason: ALTCHOICE

## 2022-09-02 NOTE — TELEPHONE ENCOUNTER
Please pend hydroxyzine 25 mg 1 tab po before flying  This will probably make her tired  Please see package insert on med before taking  Ask if any questions

## 2022-09-08 NOTE — TELEPHONE ENCOUNTER
3rd attempt:  Left voicemail for the patient to schedule procedure    (SM- BILAT L3-4, L4-5, L5-S1 FJI -- NO AUTH REQUIRED)

## 2022-09-22 ENCOUNTER — TELEPHONE (OUTPATIENT)
Dept: FAMILY MEDICINE CLINIC | Age: 52
End: 2022-09-22

## 2022-10-13 DIAGNOSIS — M79.7 FIBROMYALGIA: ICD-10-CM

## 2022-10-13 DIAGNOSIS — F41.9 ANXIETY: ICD-10-CM

## 2022-10-13 LAB
ALBUMIN SERPL-MCNC: 5.2 G/DL (ref 3.5–4.6)
ALP BLD-CCNC: 58 U/L (ref 40–130)
ALT SERPL-CCNC: 15 U/L (ref 0–33)
ANION GAP SERPL CALCULATED.3IONS-SCNC: 12 MEQ/L (ref 9–15)
AST SERPL-CCNC: 27 U/L (ref 0–35)
BASOPHILS ABSOLUTE: 0.1 K/UL (ref 0–0.2)
BASOPHILS RELATIVE PERCENT: 0.8 %
BILIRUB SERPL-MCNC: 0.3 MG/DL (ref 0.2–0.7)
BUN BLDV-MCNC: 14 MG/DL (ref 6–20)
CALCIUM SERPL-MCNC: 9.7 MG/DL (ref 8.5–9.9)
CHLORIDE BLD-SCNC: 99 MEQ/L (ref 95–107)
CHOLESTEROL, TOTAL: 275 MG/DL (ref 0–199)
CO2: 27 MEQ/L (ref 20–31)
CREAT SERPL-MCNC: 0.75 MG/DL (ref 0.5–0.9)
EOSINOPHILS ABSOLUTE: 0 K/UL (ref 0–0.7)
EOSINOPHILS RELATIVE PERCENT: 0.1 %
GFR AFRICAN AMERICAN: >60
GFR NON-AFRICAN AMERICAN: >60
GLOBULIN: 2.8 G/DL (ref 2.3–3.5)
GLUCOSE BLD-MCNC: 92 MG/DL (ref 70–99)
HCT VFR BLD CALC: 41.9 % (ref 37–47)
HDLC SERPL-MCNC: 123 MG/DL (ref 40–59)
HEMOGLOBIN: 13.8 G/DL (ref 12–16)
LDL CHOLESTEROL CALCULATED: 137 MG/DL (ref 0–129)
LYMPHOCYTES ABSOLUTE: 2.1 K/UL (ref 1–4.8)
LYMPHOCYTES RELATIVE PERCENT: 25.9 %
MCH RBC QN AUTO: 30.7 PG (ref 27–31.3)
MCHC RBC AUTO-ENTMCNC: 32.9 % (ref 33–37)
MCV RBC AUTO: 93.2 FL (ref 82–100)
MONOCYTES ABSOLUTE: 0.5 K/UL (ref 0.2–0.8)
MONOCYTES RELATIVE PERCENT: 5.6 %
NEUTROPHILS ABSOLUTE: 5.5 K/UL (ref 1.4–6.5)
NEUTROPHILS RELATIVE PERCENT: 67.6 %
PDW BLD-RTO: 12.7 % (ref 11.5–14.5)
PLATELET # BLD: 238 K/UL (ref 130–400)
POTASSIUM SERPL-SCNC: 3.9 MEQ/L (ref 3.4–4.9)
RBC # BLD: 4.49 M/UL (ref 4.2–5.4)
SODIUM BLD-SCNC: 138 MEQ/L (ref 135–144)
T4 FREE: 1.64 NG/DL (ref 0.84–1.68)
TOTAL PROTEIN: 8 G/DL (ref 6.3–8)
TRIGL SERPL-MCNC: 74 MG/DL (ref 0–150)
TSH REFLEX: 1.15 UIU/ML (ref 0.44–3.86)
WBC # BLD: 8.2 K/UL (ref 4.8–10.8)

## 2022-10-14 ENCOUNTER — OFFICE VISIT (OUTPATIENT)
Dept: FAMILY MEDICINE CLINIC | Age: 52
End: 2022-10-14
Payer: COMMERCIAL

## 2022-10-14 VITALS
SYSTOLIC BLOOD PRESSURE: 124 MMHG | DIASTOLIC BLOOD PRESSURE: 68 MMHG | HEIGHT: 65 IN | TEMPERATURE: 97.7 F | WEIGHT: 145 LBS | OXYGEN SATURATION: 100 % | BODY MASS INDEX: 24.16 KG/M2 | HEART RATE: 77 BPM

## 2022-10-14 DIAGNOSIS — N39.0 URINARY TRACT INFECTION WITHOUT HEMATURIA, SITE UNSPECIFIED: Primary | ICD-10-CM

## 2022-10-14 LAB
BILIRUBIN, POC: NORMAL
BLOOD URINE, POC: NORMAL
CLARITY, POC: CLEAR
COLOR, POC: YELLOW
GLUCOSE URINE, POC: NORMAL
KETONES, POC: NORMAL
LEUKOCYTE EST, POC: NORMAL
NITRITE, POC: NORMAL
PH, POC: 5.5
PROTEIN, POC: NORMAL
SPECIFIC GRAVITY, POC: 1.02
UROBILINOGEN, POC: NORMAL
VITAMIN D 25-HYDROXY: 57.9 NG/ML

## 2022-10-14 PROCEDURE — 99213 OFFICE O/P EST LOW 20 MIN: CPT | Performed by: PHYSICIAN ASSISTANT

## 2022-10-14 PROCEDURE — 81003 URINALYSIS AUTO W/O SCOPE: CPT | Performed by: PHYSICIAN ASSISTANT

## 2022-10-14 RX ORDER — SULFAMETHOXAZOLE AND TRIMETHOPRIM 800; 160 MG/1; MG/1
1 TABLET ORAL 2 TIMES DAILY
Qty: 14 TABLET | Refills: 0 | Status: SHIPPED | OUTPATIENT
Start: 2022-10-14 | End: 2022-10-21

## 2022-10-14 ASSESSMENT — PATIENT HEALTH QUESTIONNAIRE - PHQ9
2. FEELING DOWN, DEPRESSED OR HOPELESS: 0
1. LITTLE INTEREST OR PLEASURE IN DOING THINGS: 0
9. THOUGHTS THAT YOU WOULD BE BETTER OFF DEAD, OR OF HURTING YOURSELF: 0
5. POOR APPETITE OR OVEREATING: 0
8. MOVING OR SPEAKING SO SLOWLY THAT OTHER PEOPLE COULD HAVE NOTICED. OR THE OPPOSITE, BEING SO FIGETY OR RESTLESS THAT YOU HAVE BEEN MOVING AROUND A LOT MORE THAN USUAL: 0
SUM OF ALL RESPONSES TO PHQ QUESTIONS 1-9: 0
SUM OF ALL RESPONSES TO PHQ QUESTIONS 1-9: 0
3. TROUBLE FALLING OR STAYING ASLEEP: 0
7. TROUBLE CONCENTRATING ON THINGS, SUCH AS READING THE NEWSPAPER OR WATCHING TELEVISION: 0
SUM OF ALL RESPONSES TO PHQ9 QUESTIONS 1 & 2: 0
SUM OF ALL RESPONSES TO PHQ QUESTIONS 1-9: 0
10. IF YOU CHECKED OFF ANY PROBLEMS, HOW DIFFICULT HAVE THESE PROBLEMS MADE IT FOR YOU TO DO YOUR WORK, TAKE CARE OF THINGS AT HOME, OR GET ALONG WITH OTHER PEOPLE: 0
SUM OF ALL RESPONSES TO PHQ QUESTIONS 1-9: 0
4. FEELING TIRED OR HAVING LITTLE ENERGY: 0
6. FEELING BAD ABOUT YOURSELF - OR THAT YOU ARE A FAILURE OR HAVE LET YOURSELF OR YOUR FAMILY DOWN: 0

## 2022-10-14 ASSESSMENT — ENCOUNTER SYMPTOMS
TROUBLE SWALLOWING: 0
SINUS PRESSURE: 0
COUGH: 0
ABDOMINAL PAIN: 0
DIARRHEA: 0
SHORTNESS OF BREATH: 0
BACK PAIN: 0
CHEST TIGHTNESS: 0
VOMITING: 0

## 2022-10-14 NOTE — PROGRESS NOTES
Subjective:      Patient ID: Melissa Salinas is a 45 y.o. female who presents today for:  Chief Complaint   Patient presents with    Urinary Tract Infection     Burning and urgency for the last 2 weeks or so       HPI  45year old female who presents with complaints of dysuria and urinary urgency for the past two weeks.  Low back pain    Past Medical History:   Diagnosis Date    Abnormal Pap smear of cervix     Depression     Depression with anxiety     Depression with anxiety     Iron deficiency anemia 2019    Stroke ([address] 2016    Idiopathic she is still in process     Thyroid disease      Past Surgical History:   Procedure Laterality Date    BLADDER SURGERY      done by Dr. THOMAS [facility] x10 yrs    COLONOSCOPY  2019    KERRY AMADO MD    DILATION AND CURETTAGE      ENDOMETRIAL ABLATION      TONSILLECTOMY       Social History     Socioeconomic History    Marital status:      Spouse name: Not on file    Number of children: Not on file    Years of education: Not on file    Highest education level: Not on file   Occupational History    Not on file   Tobacco Use    Smoking status: Former     Packs/day: 0.25     Years: 3.00     Pack years: 0.75     Types: Cigarettes     Start date: 1988     Quit date: 1990     Years since quittin.2    Smokeless tobacco: Never   Vaping Use    Vaping Use: Never used   Substance and Sexual Activity    Alcohol use: Yes     Comment: ocassionally    Drug use: No    Sexual activity: Not Currently   Other Topics Concern    Not on file   Social History Narrative    Not on file     Social Determinants of Health     Financial Resource Strain: Low Risk     Difficulty of Paying Living Expenses: Not hard at all   Food Insecurity: No Food Insecurity    Worried About Running Out of Food in the Last Year: Never true    Ran Out of Food in the Last Year: Never true   Transportation Needs: Not on file   Physical Activity: Not on file   Stress: Not on file   Social Connections: Not on file   Intimate Partner Violence: Not on file   Housing Stability: Not on file     No family history on file. Allergies   Allergen Reactions    Amitriptyline      Couldn't tolerate it     Amoxicillin Hives    Nsaids Other (See Comments)     STROKE/NO NSAIDS ALLOWED    Pcn [Penicillins] Rash     Current Outpatient Medications   Medication Sig Dispense Refill    hydrOXYzine HCl (ATARAX) 25 MG tablet Take 1 tablet by mouth 30 minutes before flying as needed for anxiety. Max 1 tablet per 24 hour period 30 tablet 0    HYDROcodone-acetaminophen (NORCO) 5-325 MG per tablet Take 1 tablet by mouth daily as needed for Pain. acetaminophen (TYLENOL) 500 MG tablet Take 500 mg by mouth every 6 hours as needed for Pain      lidocaine (LMX) 4 % cream Apply a half dollar sized amount to intact skin topically up to twice daily as needed for pain 45 g 1    levothyroxine (SYNTHROID) 100 MCG tablet Take 1 tablet by mouth daily 90 tablet 3    nitrofurantoin (MACRODANTIN) 50 MG capsule Oral: 50 mg as a single dose taken within 2 hours of sexual intercourse 90 capsule 1    fluticasone (FLONASE) 50 MCG/ACT nasal spray 1 spray by Each Nostril route daily as needed       aspirin 81 MG tablet Take 81 mg by mouth Takes 2 tablets daily       No current facility-administered medications for this visit. Review of Systems   Constitutional:  Negative for activity change, appetite change, chills, fever and unexpected weight change. HENT:  Negative for drooling, ear pain, nosebleeds, sinus pressure and trouble swallowing. Respiratory:  Negative for cough, chest tightness and shortness of breath. Cardiovascular:  Negative for chest pain and leg swelling. Gastrointestinal:  Negative for abdominal pain, diarrhea and vomiting. Endocrine: Negative for polydipsia and polyphagia. Genitourinary:  Positive for dysuria and urgency. Negative for flank pain and frequency. Musculoskeletal:  Negative for back pain and myalgias. Skin:  Negative for pallor and rash. Neurological:  Negative for syncope, weakness and headaches. Hematological:  Does not bruise/bleed easily. Psychiatric/Behavioral:  Negative for agitation, behavioral problems and confusion. All other systems reviewed and are negative. Objective:   /68   Pulse 77   Temp 97.7 °F (36.5 °C) (Temporal)   Ht 5' 5\" (1.651 m)   Wt 145 lb (65.8 kg)   LMP 03/18/2019 (Approximate)   SpO2 100%   BMI 24.13 kg/m²     Physical Exam  Vitals and nursing note reviewed. Constitutional:       General: She is awake. She is not in acute distress. Appearance: Normal appearance. She is well-developed and normal weight. She is not ill-appearing, toxic-appearing or diaphoretic. Comments: No photophobia. No phonophobia. HENT:      Head: Normocephalic and atraumatic. No Keen's sign. Right Ear: External ear normal.      Left Ear: External ear normal.      Nose: Nose normal. No congestion or rhinorrhea. Mouth/Throat:      Mouth: Mucous membranes are moist.      Pharynx: Oropharynx is clear. No oropharyngeal exudate or posterior oropharyngeal erythema. Eyes:      General: No scleral icterus. Right eye: No foreign body or discharge. Left eye: No discharge. Extraocular Movements: Extraocular movements intact. Conjunctiva/sclera: Conjunctivae normal.      Left eye: No exudate. Pupils: Pupils are equal, round, and reactive to light. Neck:      Vascular: No JVD. Trachea: No tracheal deviation. Comments: No meningismus. Cardiovascular:      Rate and Rhythm: Normal rate and regular rhythm. Heart sounds: Normal heart sounds. Heart sounds not distant. No murmur heard. No friction rub. No gallop. Pulmonary:      Effort: Pulmonary effort is normal. No respiratory distress. Breath sounds: Normal breath sounds. No stridor. No wheezing, rhonchi or rales. Chest:      Chest wall: No tenderness.    Abdominal: General: Abdomen is flat. Bowel sounds are normal. There is no distension. Palpations: Abdomen is soft. There is no mass. Tenderness: There is no abdominal tenderness. There is no right CVA tenderness, left CVA tenderness, guarding or rebound. Hernia: No hernia is present. Musculoskeletal:         General: No swelling, tenderness, deformity or signs of injury. Normal range of motion. Cervical back: Normal range of motion and neck supple. No rigidity. Lymphadenopathy:      Head:      Right side of head: No submental adenopathy. Left side of head: No submental adenopathy. Skin:     General: Skin is warm and dry. Capillary Refill: Capillary refill takes less than 2 seconds. Coloration: Skin is not jaundiced or pale. Findings: No bruising, erythema, lesion or rash. Neurological:      General: No focal deficit present. Mental Status: She is alert and oriented to person, place, and time. Mental status is at baseline. Cranial Nerves: No cranial nerve deficit. Sensory: No sensory deficit. Motor: No weakness. Coordination: Coordination normal.      Deep Tendon Reflexes: Reflexes are normal and symmetric. Psychiatric:         Mood and Affect: Mood normal.         Behavior: Behavior normal. Behavior is cooperative. Thought Content: Thought content normal.         Judgment: Judgment normal.       Assessment:       Diagnosis Orders   1.  Urinary tract infection without hematuria, site unspecified  POCT Urinalysis No Micro (Auto)    Culture, Urine        Results for POC orders placed in visit on 10/14/22   POCT Urinalysis No Micro (Auto)   Result Value Ref Range    Color, UA yellow     Clarity, UA clear     Glucose, UA POC -     Bilirubin, UA -     Ketones, UA -     Spec Grav, UA 1.025     Blood, UA POC 2+     pH, UA 5.5     Protein, UA POC -     Urobilinogen, UA -     Leukocytes, UA -     Nitrite, UA -       Plan:     Assessment & Plan   [facility] was seen today for urinary tract infection. Diagnoses and all orders for this visit:    Urinary tract infection without hematuria, site unspecified  -     POCT Urinalysis No Micro (Auto)  -     Culture, Urine      Orders Placed This Encounter   Procedures    Culture, Urine     Order Specific Question:   Specify (ex-cath, midstream, cysto, etc)? Answer:   CLEAN CATCH    POCT Urinalysis No Micro (Auto)     No orders of the defined types were placed in this encounter. There are no discontinued medications. No follow-ups on file. Reviewed with the patient/family: current clinical status & medications. Side effects of the medication prescribed today, as well as treatment plan/rationale and result expectations have been discussed with the patient/family who expresses understanding. Patient will be discharged home in stable condition. Follow up with PCP to evaluate treatment results or return if symptoms worsen or fail to improve. Discussed signs and symptoms which require immediate follow-up in ED/call to 911. Understanding verbalized. I have reviewed the patient's medical history in detail and updated the computerized patient record.     MIREYA Collins

## 2022-10-15 LAB — URINE CULTURE, ROUTINE: NORMAL

## 2022-10-27 ENCOUNTER — OFFICE VISIT (OUTPATIENT)
Dept: FAMILY MEDICINE CLINIC | Age: 52
End: 2022-10-27
Payer: COMMERCIAL

## 2022-10-27 VITALS
SYSTOLIC BLOOD PRESSURE: 139 MMHG | WEIGHT: 145 LBS | DIASTOLIC BLOOD PRESSURE: 83 MMHG | HEIGHT: 65 IN | BODY MASS INDEX: 24.16 KG/M2 | OXYGEN SATURATION: 99 % | TEMPERATURE: 98 F | HEART RATE: 80 BPM

## 2022-10-27 DIAGNOSIS — E78.2 MIXED HYPERLIPIDEMIA: ICD-10-CM

## 2022-10-27 DIAGNOSIS — E03.9 ACQUIRED HYPOTHYROIDISM: ICD-10-CM

## 2022-10-27 DIAGNOSIS — M79.7 FIBROMYALGIA: ICD-10-CM

## 2022-10-27 DIAGNOSIS — R73.03 PREDIABETES: ICD-10-CM

## 2022-10-27 DIAGNOSIS — Z76.89 ENCOUNTER TO ESTABLISH CARE WITH NEW DOCTOR: Primary | ICD-10-CM

## 2022-10-27 PROCEDURE — 99214 OFFICE O/P EST MOD 30 MIN: CPT | Performed by: STUDENT IN AN ORGANIZED HEALTH CARE EDUCATION/TRAINING PROGRAM

## 2022-10-27 RX ORDER — ATORVASTATIN CALCIUM 40 MG/1
40 TABLET, FILM COATED ORAL DAILY
Qty: 90 TABLET | Refills: 1 | Status: SHIPPED | OUTPATIENT
Start: 2022-10-27 | End: 2023-01-25

## 2022-10-27 NOTE — PROGRESS NOTES
Subjective  Jeannie Castaneda, 45 y.o. female presents today with:  Chief Complaint   Patient presents with    Establish Care     Was seeing Dr. Chris Hoffmann prior. Has no concerns at this time. Results     Would like to review recent test results. HPI    Patient with appointment to establish care with new physician. She was previously seeing Dr. Chris Hoffmann. Patient with hypothyroidism. She is on levothyroxine 100 mcg daily. Tolerating well. No side effects of medication. No changes to hair skin or nails. No heat or cold intolerance. Last TSH within normal limits. Patient with prediabetes. She is not currently on medications. Her last A1c was in normal range at 5.3%. Patient with hyperlipidemia. She also has history of stroke. She is not currently on a statin. We did discuss this. She is willing to go on 1. Patients with fibromyalgia. She has been doing okay with this. She did recently get on disability. She has no other concerns at this time. Review of Systems   Constitutional:  Negative for chills, fatigue, fever and unexpected weight change. HENT:  Negative for congestion, rhinorrhea and sore throat. Respiratory:  Negative for cough, shortness of breath and wheezing. Cardiovascular:  Negative for chest pain, palpitations and leg swelling. Gastrointestinal:  Negative for abdominal pain, diarrhea, nausea and vomiting. Musculoskeletal:  Positive for myalgias. Negative for arthralgias and joint swelling. Skin:  Negative for rash. Neurological:  Negative for weakness, light-headedness, numbness and headaches. Psychiatric/Behavioral:  Negative for confusion. The patient is not nervous/anxious. Past Medical History:   Diagnosis Date    Abnormal Pap smear of cervix     Allergic rhinitis     Cerebrovascular disease 2016    Chronic back pain 9/22/2019    Depression     Depression with anxiety     Depression with anxiety     Fibromyalgia 2019?     GERD (gastroesophageal reflux disease)     Headache 1987    Hyperthyroidism 1995? Hypothyroidism 1994    Not sure of exact date    Iron deficiency anemia 04/12/2019    Irritable bowel syndrome     Osteoarthritis 1/2022    Stroke ([address] 05/2016    Idiopathic she is still in process     Thyroid disease      Past Surgical History:   Procedure Laterality Date    BLADDER SURGERY      done by Dr. THOMAS [facility] x10 yrs    COLONOSCOPY  03/04/2019    KERRY AMADO MD    DILATION AND CURETTAGE      ENDOMETRIAL ABLATION      TONSILLECTOMY       Current Outpatient Medications   Medication Sig Dispense Refill    atorvastatin (LIPITOR) 40 MG tablet Take 1 tablet by mouth daily 90 tablet 1    acetaminophen (TYLENOL) 500 MG tablet Take 500 mg by mouth every 6 hours as needed for Pain      lidocaine (LMX) 4 % cream Apply a half dollar sized amount to intact skin topically up to twice daily as needed for pain 45 g 1    levothyroxine (SYNTHROID) 100 MCG tablet Take 1 tablet by mouth daily 90 tablet 3    nitrofurantoin (MACRODANTIN) 50 MG capsule Oral: 50 mg as a single dose taken within 2 hours of sexual intercourse 90 capsule 1    fluticasone (FLONASE) 50 MCG/ACT nasal spray 1 spray by Each Nostril route daily as needed       aspirin 81 MG tablet Take 81 mg by mouth Takes 2 tablets daily       No current facility-administered medications for this visit. PMH, Surgical Hx, Family Hx, and Social Hx reviewed and updated. Health Maintenance reviewed. Objective    Vitals:    10/27/22 0806   BP: 139/83   Pulse: 80   Temp: 98 °F (36.7 °C)   SpO2: 99%   Weight: 145 lb (65.8 kg)   Height: 5' 5\" (1.651 m)       Physical Exam  Vitals reviewed. Constitutional:       General: She is not in acute distress. HENT:      Head: Normocephalic and atraumatic. Eyes:      Conjunctiva/sclera: Conjunctivae normal.   Neck:      Thyroid: No thyromegaly or thyroid tenderness. Cardiovascular:      Rate and Rhythm: Normal rate and regular rhythm.       Heart sounds: No murmur heard.    No friction rub. No gallop. Pulmonary:      Effort: Pulmonary effort is normal.      Breath sounds: Normal breath sounds. No wheezing, rhonchi or rales. Musculoskeletal:         General: No swelling or deformity. Cervical back: Normal range of motion and neck supple. Right lower leg: No edema. Left lower leg: No edema. Lymphadenopathy:      Cervical: No cervical adenopathy. Skin:     General: Skin is warm and dry. Findings: No rash. Neurological:      General: No focal deficit present. Mental Status: She is alert. Gait: Gait is intact. Psychiatric:         Mood and Affect: Mood normal.         Behavior: Behavior normal.          Assessment & Plan       1. Encounter to establish care with new doctor  Patient with appointment to establish care with new physician. She was previously seeing Dr. Priyank Mckinley. 2. Acquired hypothyroidism  Stable, chronic. Last TSH within normal limits. Continue levothyroxine 100 mcg daily. No refills needed. Continue to monitor. Follow-up as scheduled. 3. Prediabetes  Stable, chronic. Plan to get A1c at next follow-up appointment. Not currently on meds. Continue to monitor. 4. Mixed hyperlipidemia  Not currently controlled. Given her history of stroke, will start atorvastatin 40 mg daily. New prescription sent to the pharmacy. Continue to monitor. Follow-up as scheduled. - atorvastatin (LIPITOR) 40 MG tablet; Take 1 tablet by mouth daily  Dispense: 90 tablet; Refill: 1    5. Fibromyalgia  Stable, chronic. Continue all current therapies. No orders of the defined types were placed in this encounter.     Orders Placed This Encounter   Medications    atorvastatin (LIPITOR) 40 MG tablet     Sig: Take 1 tablet by mouth daily     Dispense:  90 tablet     Refill:  1       Medications Discontinued During This Encounter   Medication Reason    HYDROcodone-acetaminophen (NORCO) 5-325 MG per tablet Therapy completed    hydrOXYzine HCl (ATARAX) 25 MG tablet Therapy completed     Return in about 6 months (around 4/27/2023) for follow up. Reviewed with the patient: current clinical status,medications, activities and diet. 30 minute spent talking with patient and reviewing chart. Close follow up to evaluate treatment results and for coordination of care. I have reviewed the patient's medical history in detail and updated the computerized patient record. Please note, this report has been partially produced using speech recognition software and may cause  and /or contain errors related to that system including grammar, punctuation and spelling as well as words and phrases that may seem inappropriate. If there are questions or concerns please feel free to contact me to clarify.     Jadiel Titus, DO

## 2022-10-28 ASSESSMENT — ENCOUNTER SYMPTOMS
COUGH: 0
ABDOMINAL PAIN: 0
SORE THROAT: 0
VOMITING: 0
DIARRHEA: 0
RHINORRHEA: 0
NAUSEA: 0
SHORTNESS OF BREATH: 0
WHEEZING: 0

## 2022-11-16 ENCOUNTER — HOSPITAL ENCOUNTER (OUTPATIENT)
Dept: MRI IMAGING | Age: 52
Discharge: HOME | End: 2022-11-18
Payer: COMMERCIAL

## 2022-11-16 ENCOUNTER — ANESTHESIA EVENT (OUTPATIENT)
Dept: MRI IMAGING | Age: 52
End: 2022-11-16

## 2022-11-16 ENCOUNTER — ANESTHESIA (OUTPATIENT)
Dept: MRI IMAGING | Age: 52
End: 2022-11-16

## 2022-11-16 VITALS
RESPIRATION RATE: 16 BRPM | SYSTOLIC BLOOD PRESSURE: 132 MMHG | HEART RATE: 62 BPM | OXYGEN SATURATION: 98 % | TEMPERATURE: 96.8 F | WEIGHT: 145 LBS | DIASTOLIC BLOOD PRESSURE: 62 MMHG | BODY MASS INDEX: 24.16 KG/M2 | HEIGHT: 65 IN

## 2022-11-16 DIAGNOSIS — M54.6 THORACIC BACK PAIN, UNSPECIFIED BACK PAIN LATERALITY, UNSPECIFIED CHRONICITY: ICD-10-CM

## 2022-11-16 PROCEDURE — 72146 MRI CHEST SPINE W/O DYE: CPT

## 2022-11-16 PROCEDURE — 7100000000 HC PACU RECOVERY - FIRST 15 MIN

## 2022-11-16 PROCEDURE — 7100000001 HC PACU RECOVERY - ADDTL 15 MIN

## 2022-11-16 PROCEDURE — 6360000002 HC RX W HCPCS: Performed by: NURSE ANESTHETIST, CERTIFIED REGISTERED

## 2022-11-16 PROCEDURE — 3700000000 HC ANESTHESIA ATTENDED CARE

## 2022-11-16 PROCEDURE — 2580000003 HC RX 258: Performed by: STUDENT IN AN ORGANIZED HEALTH CARE EDUCATION/TRAINING PROGRAM

## 2022-11-16 PROCEDURE — 3700000001 HC ADD 15 MINUTES (ANESTHESIA)

## 2022-11-16 RX ORDER — MEPERIDINE HYDROCHLORIDE 25 MG/ML
12.5 INJECTION INTRAMUSCULAR; INTRAVENOUS; SUBCUTANEOUS
OUTPATIENT
Start: 2022-11-16 | End: 2022-11-17

## 2022-11-16 RX ORDER — SODIUM CHLORIDE 9 MG/ML
25 INJECTION, SOLUTION INTRAVENOUS PRN
OUTPATIENT
Start: 2022-11-16

## 2022-11-16 RX ORDER — ONDANSETRON 2 MG/ML
4 INJECTION INTRAMUSCULAR; INTRAVENOUS
OUTPATIENT
Start: 2022-11-16 | End: 2022-11-17

## 2022-11-16 RX ORDER — SODIUM CHLORIDE 0.9 % (FLUSH) 0.9 %
5-40 SYRINGE (ML) INJECTION EVERY 12 HOURS SCHEDULED
OUTPATIENT
Start: 2022-11-16

## 2022-11-16 RX ORDER — OXYCODONE HYDROCHLORIDE 5 MG/1
10 TABLET ORAL PRN
OUTPATIENT
Start: 2022-11-16 | End: 2022-11-16

## 2022-11-16 RX ORDER — METOCLOPRAMIDE HYDROCHLORIDE 5 MG/ML
10 INJECTION INTRAMUSCULAR; INTRAVENOUS
OUTPATIENT
Start: 2022-11-16 | End: 2022-11-17

## 2022-11-16 RX ORDER — PROPOFOL 10 MG/ML
INJECTION, EMULSION INTRAVENOUS PRN
Status: DISCONTINUED | OUTPATIENT
Start: 2022-11-16 | End: 2022-11-16 | Stop reason: SDUPTHER

## 2022-11-16 RX ORDER — SODIUM CHLORIDE, SODIUM LACTATE, POTASSIUM CHLORIDE, CALCIUM CHLORIDE 600; 310; 30; 20 MG/100ML; MG/100ML; MG/100ML; MG/100ML
INJECTION, SOLUTION INTRAVENOUS CONTINUOUS
Status: DISCONTINUED | OUTPATIENT
Start: 2022-11-16 | End: 2022-11-19 | Stop reason: HOSPADM

## 2022-11-16 RX ORDER — OXYCODONE HYDROCHLORIDE 5 MG/1
5 TABLET ORAL PRN
OUTPATIENT
Start: 2022-11-16 | End: 2022-11-16

## 2022-11-16 RX ORDER — DIPHENHYDRAMINE HYDROCHLORIDE 50 MG/ML
12.5 INJECTION INTRAMUSCULAR; INTRAVENOUS
OUTPATIENT
Start: 2022-11-16 | End: 2022-11-17

## 2022-11-16 RX ORDER — MIDAZOLAM HYDROCHLORIDE 1 MG/ML
INJECTION INTRAMUSCULAR; INTRAVENOUS PRN
Status: DISCONTINUED | OUTPATIENT
Start: 2022-11-16 | End: 2022-11-16 | Stop reason: SDUPTHER

## 2022-11-16 RX ORDER — FENTANYL CITRATE 50 UG/ML
50 INJECTION, SOLUTION INTRAMUSCULAR; INTRAVENOUS EVERY 10 MIN PRN
OUTPATIENT
Start: 2022-11-16

## 2022-11-16 RX ORDER — SODIUM CHLORIDE 0.9 % (FLUSH) 0.9 %
5-40 SYRINGE (ML) INJECTION PRN
OUTPATIENT
Start: 2022-11-16

## 2022-11-16 RX ADMIN — SODIUM CHLORIDE, POTASSIUM CHLORIDE, SODIUM LACTATE AND CALCIUM CHLORIDE: 600; 310; 30; 20 INJECTION, SOLUTION INTRAVENOUS at 12:35

## 2022-11-16 RX ADMIN — MIDAZOLAM HYDROCHLORIDE 2 MG: 1 INJECTION, SOLUTION INTRAMUSCULAR; INTRAVENOUS at 14:40

## 2022-11-16 RX ADMIN — MIDAZOLAM HYDROCHLORIDE 2 MG: 1 INJECTION, SOLUTION INTRAMUSCULAR; INTRAVENOUS at 14:35

## 2022-11-16 RX ADMIN — PROPOFOL 75 MCG/KG/MIN: 10 INJECTION, EMULSION INTRAVENOUS at 14:37

## 2022-11-16 RX ADMIN — PROPOFOL 50 MG: 10 INJECTION, EMULSION INTRAVENOUS at 14:43

## 2022-11-16 ASSESSMENT — PAIN - FUNCTIONAL ASSESSMENT: PAIN_FUNCTIONAL_ASSESSMENT: 0-10

## 2022-11-16 NOTE — ANESTHESIA PRE PROCEDURE
Department of Anesthesiology  Preprocedure Note       Name:  Nicholas Alcantar   Age:  45 y.o.  :  1970                                          MRN:  [id number]         Date:  2022      Surgeon: * No surgeons listed *    Procedure: * No procedures listed *    Medications prior to admission:   Prior to Admission medications    Medication Sig Start Date End Date Taking?  Authorizing Provider   atorvastatin (LIPITOR) 40 MG tablet Take 1 tablet by mouth daily 10/27/22 1/25/23  Jadiel Titus DO   acetaminophen (TYLENOL) 500 MG tablet Take 500 mg by mouth every 6 hours as needed for Pain    Historical Provider, MD   lidocaine (LMX) 4 % cream Apply a half dollar sized amount to intact skin topically up to twice daily as needed for pain 22   Dorothy Fox MD   levothyroxine (SYNTHROID) 100 MCG tablet Take 1 tablet by mouth daily 3/31/22   Yelena Bustamante MD   nitrofurantoin (MACRODANTIN) 50 MG capsule Oral: 50 mg as a single dose taken within 2 hours of sexual intercourse 22   Yelena Bustamante MD   fluticasone (FLONASE) 50 MCG/ACT nasal spray 1 spray by Each Nostril route daily as needed     Historical Provider, MD   aspirin 81 MG tablet Take 81 mg by mouth Takes 2 tablets daily    Historical Provider, MD       Current medications:    Current Outpatient Medications   Medication Sig Dispense Refill    atorvastatin (LIPITOR) 40 MG tablet Take 1 tablet by mouth daily 90 tablet 1    acetaminophen (TYLENOL) 500 MG tablet Take 500 mg by mouth every 6 hours as needed for Pain      lidocaine (LMX) 4 % cream Apply a half dollar sized amount to intact skin topically up to twice daily as needed for pain 45 g 1    levothyroxine (SYNTHROID) 100 MCG tablet Take 1 tablet by mouth daily 90 tablet 3    nitrofurantoin (MACRODANTIN) 50 MG capsule Oral: 50 mg as a single dose taken within 2 hours of sexual intercourse 90 capsule 1    fluticasone (FLONASE) 50 MCG/ACT nasal spray 1 spray by Each Nostril route daily as needed       aspirin 81 MG tablet Take 81 mg by mouth Takes 2 tablets daily       Current Facility-Administered Medications   Medication Dose Route Frequency Provider Last Rate Last Admin    lactated ringers infusion   IntraVENous Continuous Weott Gift Quique,  mL/hr at 11/16/22 1430 Restarted at 11/16/22 1530       Allergies: Allergies   Allergen Reactions    Amitriptyline      Couldn't tolerate it     Amoxicillin Hives    Nsaids Other (See Comments)     STROKE/NO NSAIDS ALLOWED    Pcn [Penicillins] Rash       Problem List:    Patient Active Problem List   Diagnosis Code    Full incontinence of feces R15.9    Cerebrovascular accident (CVA) ([facility] [address] I63.9    Depression with anxiety F41.8    Hypothyroidism E03.9    Iron deficiency anemia D50.9    Prediabetes R73.03    Right hip impingement syndrome M25.851    Rosacea L71.9    Spondylosis of lumbar region without myelopathy or radiculopathy M47.816    Memory loss R41.3    Acute bacterial sinusitis J01.90, B96.89    Brain fog R41.89    Fibromyalgia M79.7    Attention deficit R41.840    Urinary tract infection without hematuria N39.0    Mixed hyperlipidemia E78.2       Past Medical History:        Diagnosis Date    Abnormal Pap smear of cervix     Allergic rhinitis     Cerebrovascular disease 2016    Chronic back pain 9/22/2019    Depression     Depression with anxiety     Depression with anxiety     Fibromyalgia 2019?  GERD (gastroesophageal reflux disease)     Headache 1987    Hyperthyroidism 1995?     Hypothyroidism 1994    Not sure of exact date    Iron deficiency anemia 04/12/2019    Irritable bowel syndrome     Osteoarthritis 1/2022    Stroke ([facility] [address] 05/2016    Idiopathic she is still in process     Thyroid disease        Past Surgical History:        Procedure Laterality Date    BLADDER SURGERY      done by Dr. [facility] x10 yrs    COLONOSCOPY  03/04/2019    KERRY AMADO MD    DILATION AND CURETTAGE      ENDOMETRIAL ABLATION      TONSILLECTOMY         Social History:    Social History     Tobacco Use    Smoking status: Former     Packs/day: 0.25     Years: 3.00     Pack years: 0.75     Types: Cigarettes     Start date: 1988     Quit date: 1990     Years since quittin.3    Smokeless tobacco: Never   Substance Use Topics    Alcohol use: Yes     Comment: ocassionally                                Counseling given: Not Answered      Vital Signs (Current):   Vitals:    22 1229 22 1540   BP: (!) 149/70 118/76   Pulse: 59 74   Resp: 16 16   Temp: 36 °C (96.8 °F)    TempSrc: Temporal    SpO2: 99% 99%   Weight: 145 lb (65.8 kg)    Height: 5' 5\" (1.651 m)                                               BP Readings from Last 3 Encounters:   22 118/76   10/27/22 139/83   10/14/22 124/68       NPO Status: Time of last liquid consumption: 1700                        Time of last solid consumption: 1700                        Date of last liquid consumption: 11/15/22                        Date of last solid food consumption: 11/15/22    BMI:   Wt Readings from Last 3 Encounters:   22 145 lb (65.8 kg)   10/27/22 145 lb (65.8 kg)   10/14/22 145 lb (65.8 kg)     Body mass index is 24.13 kg/m².     CBC:   Lab Results   Component Value Date/Time    WBC 8.2 10/13/2022 10:09 AM    RBC 4.49 10/13/2022 10:09 AM    HGB 13.8 10/13/2022 10:09 AM    HCT 41.9 10/13/2022 10:09 AM    MCV 93.2 10/13/2022 10:09 AM    RDW 12.7 10/13/2022 10:09 AM     10/13/2022 10:09 AM       CMP:   Lab Results   Component Value Date/Time     10/13/2022 10:09 AM    K 3.9 10/13/2022 10:09 AM    CL 99 10/13/2022 10:09 AM    CO2 27 10/13/2022 10:09 AM    BUN 14 10/13/2022 10:09 AM    CREATININE 0.75 10/13/2022 10:09 AM    GFRAA >60.0 10/13/2022 10:09 AM    LABGLOM >60.0 10/13/2022 10:09 AM    GLUCOSE 92 10/13/2022 10:09 AM    PROT 8.0 10/13/2022 10:09 AM    CALCIUM 9.7 10/13/2022 10:09 AM    BILITOT 0.3 10/13/2022 10:09 AM ALKPHOS 58 10/13/2022 10:09 AM    AST 27 10/13/2022 10:09 AM    ALT 15 10/13/2022 10:09 AM       POC Tests: No results for input(s): POCGLU, POCNA, POCK, POCCL, POCBUN, POCHEMO, POCHCT in the last 72 hours. Coags:   Lab Results   Component Value Date/Time    PROTIME 10.0 06/20/2016 07:22 AM    INR 0.9 06/20/2016 07:22 AM    APTT 23.8 06/20/2016 07:22 AM       HCG (If Applicable): No results found for: PREGTESTUR, PREGSERUM, HCG, HCGQUANT     ABGs: No results found for: PHART, PO2ART, IRZ0AJS, CEY2ZXU, BEART, I5YWPSVM     Type & Screen (If Applicable):  No results found for: LABABO, LABRH    Drug/Infectious Status (If Applicable):  No results found for: HIV, HEPCAB    COVID-19 Screening (If Applicable):   Lab Results   Component Value Date/Time    COVID19 Not Detected 09/17/2021 04:25 PM           Anesthesia Evaluation    Airway: Mallampati: II          Dental:          Pulmonary:                              Cardiovascular:                      Neuro/Psych:               GI/Hepatic/Renal:             Endo/Other:                     Abdominal:             Vascular:           Other Findings:           Anesthesia Plan        TANIYA Rowe CRNA   11/16/2022

## 2022-11-16 NOTE — ANESTHESIA PRE PROCEDURE
Department of Anesthesiology  Preprocedure Note       Name:  Cheikh Ramirez   Age:  45 y.o.  :  1970                                          MRN:  [id number]         Date:  2022      Surgeon: * No surgeons listed *    Procedure: * No procedures listed *    Medications prior to admission:   Prior to Admission medications    Medication Sig Start Date End Date Taking?  Authorizing Provider   atorvastatin (LIPITOR) 40 MG tablet Take 1 tablet by mouth daily 10/27/22 1/25/23  Marita BriggsyDO   acetaminophen (TYLENOL) 500 MG tablet Take 500 mg by mouth every 6 hours as needed for Pain    Historical Provider, MD   lidocaine (LMX) 4 % cream Apply a half dollar sized amount to intact skin topically up to twice daily as needed for pain 22   Oniel Morataya MD   levothyroxine (SYNTHROID) 100 MCG tablet Take 1 tablet by mouth daily 3/31/22   Rhianna Chadwick MD   nitrofurantoin (MACRODANTIN) 50 MG capsule Oral: 50 mg as a single dose taken within 2 hours of sexual intercourse 22   Rhianna Chadwick MD   fluticasone (FLONASE) 50 MCG/ACT nasal spray 1 spray by Each Nostril route daily as needed     Historical Provider, MD   aspirin 81 MG tablet Take 81 mg by mouth Takes 2 tablets daily    Historical Provider, MD       Current medications:    Current Outpatient Medications   Medication Sig Dispense Refill    atorvastatin (LIPITOR) 40 MG tablet Take 1 tablet by mouth daily 90 tablet 1    acetaminophen (TYLENOL) 500 MG tablet Take 500 mg by mouth every 6 hours as needed for Pain      lidocaine (LMX) 4 % cream Apply a half dollar sized amount to intact skin topically up to twice daily as needed for pain 45 g 1    levothyroxine (SYNTHROID) 100 MCG tablet Take 1 tablet by mouth daily 90 tablet 3    nitrofurantoin (MACRODANTIN) 50 MG capsule Oral: 50 mg as a single dose taken within 2 hours of sexual intercourse 90 capsule 1    fluticasone (FLONASE) 50 MCG/ACT nasal spray 1 spray by Each Nostril route daily as needed       aspirin 81 MG tablet Take 81 mg by mouth Takes 2 tablets daily       Current Facility-Administered Medications   Medication Dose Route Frequency Provider Last Rate Last Admin    lactated ringers infusion   IntraVENous Continuous Josette Lei,  mL/hr at 11/16/22 1235 New Bag at 11/16/22 1235       Allergies: Allergies   Allergen Reactions    Amitriptyline      Couldn't tolerate it     Amoxicillin Hives    Nsaids Other (See Comments)     STROKE/NO NSAIDS ALLOWED    Pcn [Penicillins] Rash       Problem List:    Patient Active Problem List   Diagnosis Code    Full incontinence of feces R15.9    Cerebrovascular accident (CVA) ([facility] [address] I63.9    Depression with anxiety F41.8    Hypothyroidism E03.9    Iron deficiency anemia D50.9    Prediabetes R73.03    Right hip impingement syndrome M25.851    Rosacea L71.9    Spondylosis of lumbar region without myelopathy or radiculopathy M47.816    Memory loss R41.3    Acute bacterial sinusitis J01.90, B96.89    Brain fog R41.89    Fibromyalgia M79.7    Attention deficit R41.840    Urinary tract infection without hematuria N39.0    Mixed hyperlipidemia E78.2       Past Medical History:        Diagnosis Date    Abnormal Pap smear of cervix     Allergic rhinitis     Cerebrovascular disease 2016    Chronic back pain 9/22/2019    Depression     Depression with anxiety     Depression with anxiety     Fibromyalgia 2019?  GERD (gastroesophageal reflux disease)     Headache 1987    Hyperthyroidism 1995?     Hypothyroidism 1994    Not sure of exact date    Iron deficiency anemia 04/12/2019    Irritable bowel syndrome     Osteoarthritis 1/2022    Stroke ([facility] [address] 05/2016    Idiopathic she is still in process     Thyroid disease        Past Surgical History:        Procedure Laterality Date    BLADDER SURGERY      done by Dr. IDANIA Burciaga 23 x10 yrs    COLONOSCOPY  03/04/2019    KERRY AMADO MD    DILATION AND CURETTAGE      ENDOMETRIAL ABLATION      TONSILLECTOMY         Social History:    Social History     Tobacco Use    Smoking status: Former     Packs/day: 0.25     Years: 3.00     Pack years: 0.75     Types: Cigarettes     Start date: 1988     Quit date: 1990     Years since quittin.3    Smokeless tobacco: Never   Substance Use Topics    Alcohol use: Yes     Comment: ocassionally                                Counseling given: Not Answered      Vital Signs (Current):   Vitals:    22 1229   BP: (!) 149/70   Pulse: 59   Resp: 16   Temp: 96.8 °F (36 °C)   TempSrc: Temporal   SpO2: 99%   Weight: 145 lb (65.8 kg)   Height: 5' 5\" (1.651 m)                                              BP Readings from Last 3 Encounters:   22 (!) 149/70   10/27/22 139/83   10/14/22 124/68       NPO Status: Time of last liquid consumption: 1700                        Time of last solid consumption: 1700                        Date of last liquid consumption: 11/15/22                        Date of last solid food consumption: 11/15/22    BMI:   Wt Readings from Last 3 Encounters:   22 145 lb (65.8 kg)   10/27/22 145 lb (65.8 kg)   10/14/22 145 lb (65.8 kg)     Body mass index is 24.13 kg/m².     CBC:   Lab Results   Component Value Date/Time    WBC 8.2 10/13/2022 10:09 AM    RBC 4.49 10/13/2022 10:09 AM    HGB 13.8 10/13/2022 10:09 AM    HCT 41.9 10/13/2022 10:09 AM    MCV 93.2 10/13/2022 10:09 AM    RDW 12.7 10/13/2022 10:09 AM     10/13/2022 10:09 AM       CMP:   Lab Results   Component Value Date/Time     10/13/2022 10:09 AM    K 3.9 10/13/2022 10:09 AM    CL 99 10/13/2022 10:09 AM    CO2 27 10/13/2022 10:09 AM    BUN 14 10/13/2022 10:09 AM    CREATININE 0.75 10/13/2022 10:09 AM    GFRAA >60.0 10/13/2022 10:09 AM    LABGLOM >60.0 10/13/2022 10:09 AM    GLUCOSE 92 10/13/2022 10:09 AM    PROT 8.0 10/13/2022 10:09 AM    CALCIUM 9.7 10/13/2022 10:09 AM    BILITOT 0.3 10/13/2022 10:09 AM    ALKPHOS 58 10/13/2022 10:09 AM AST 27 10/13/2022 10:09 AM    ALT 15 10/13/2022 10:09 AM       POC Tests: No results for input(s): POCGLU, POCNA, POCK, POCCL, POCBUN, POCHEMO, POCHCT in the last 72 hours. Coags:   Lab Results   Component Value Date/Time    PROTIME 10.0 06/20/2016 07:22 AM    INR 0.9 06/20/2016 07:22 AM    APTT 23.8 06/20/2016 07:22 AM       HCG (If Applicable): No results found for: PREGTESTUR, PREGSERUM, HCG, HCGQUANT     ABGs: No results found for: PHART, PO2ART, NYJ3JLC, NVQ5GUZ, BEART, Q2UEUTWF     Type & Screen (If Applicable):  No results found for: LABABO, LABRH    Drug/Infectious Status (If Applicable):  No results found for: HIV, HEPCAB    COVID-19 Screening (If Applicable):   Lab Results   Component Value Date/Time    COVID19 Not Detected 09/17/2021 04:25 PM           Anesthesia Evaluation  Patient summary reviewed and Nursing notes reviewed no history of anesthetic complications:   Airway:      Neck ROM: full  Mouth opening: > = 3 FB   Dental: normal exam         Pulmonary:Negative Pulmonary ROS and normal exam                               Cardiovascular:Negative CV ROS                      Neuro/Psych:   (+) CVA:, headaches:,             GI/Hepatic/Renal:   (+) GERD:,           Endo/Other:    (+) hypothyroidism, hyperthyroidism::., .                 Abdominal:             Vascular: negative vascular ROS. Other Findings:           Anesthesia Plan      MAC     ASA 3             Anesthetic plan and risks discussed with patient. Plan discussed with CRNA.     Attending anesthesiologist reviewed and agrees with Preprocedure content                Tony Flores MD   11/16/2022

## 2022-11-16 NOTE — ANESTHESIA POSTPROCEDURE EVALUATION
Department of Anesthesiology  Postprocedure Note    Patient: Ernesto Sinclair  MRN: [id number]  YOB: 1970  Date of evaluation: 11/16/2022      Procedure Summary     Date: 11/16/22 Room / Location: [facility]    Anesthesia Start: 1430 Anesthesia Stop: 1458    Procedure: MRI THORACIC SPINE WO CONTRAST Diagnosis: Thoracic back pain, unspecified back pain laterality, unspecified chronicity    Scheduled Providers:  Responsible Provider: Norma Chavarria MD    Anesthesia Type: MAC ASA Status: 3          Anesthesia Type: MAC    Christophe Phase I: Christophe Score: 10    Christophe Phase II:        Anesthesia Post Evaluation    Patient location during evaluation: bedside  Patient participation: complete - patient participated  Level of consciousness: awake and awake and alert  Pain score: 0  Airway patency: patent  Nausea & Vomiting: no nausea and no vomiting  Complications: no  Cardiovascular status: blood pressure returned to baseline and hemodynamically stable  Respiratory status: acceptable  Hydration status: euvolemic

## 2022-12-09 ENCOUNTER — HOSPITAL ENCOUNTER (OUTPATIENT)
Dept: WOMENS IMAGING | Age: 52
Discharge: HOME | End: 2022-12-09
Payer: COMMERCIAL

## 2022-12-09 VITALS — HEIGHT: 66 IN | WEIGHT: 146 LBS | BODY MASS INDEX: 23.46 KG/M2

## 2022-12-09 DIAGNOSIS — Z01.419 ENCOUNTER FOR WELL WOMAN EXAM WITH ROUTINE GYNECOLOGICAL EXAM: ICD-10-CM

## 2022-12-09 DIAGNOSIS — Z12.31 ENCOUNTER FOR SCREENING MAMMOGRAM FOR MALIGNANT NEOPLASM OF BREAST: ICD-10-CM

## 2022-12-09 PROCEDURE — 77063 BREAST TOMOSYNTHESIS BI: CPT

## 2023-01-11 ENCOUNTER — NURSE TRIAGE (OUTPATIENT)
Dept: OTHER | Facility: CLINIC | Age: 53
End: 2023-01-11

## 2023-01-11 NOTE — TELEPHONE ENCOUNTER
Location of patient: [address] call from Prince claros at [employer] with [employer]. Subjective: Caller states \"About 3 weeks ago I thought I just had allergies and then decided it was a cold. I don't know if it was something like RSV or something. Everything stayed clear and I dealt with it. The last couple of days I have felt very nauseous and have had a couple episodes of dizziness like the room spinning. It happened yesterday and I wasn't even moving. I do have fibromyalgia so I don't know if that is causing the nausea. I did check myself for COVID which was negative . \"     Current Symptoms: dizziness    Onset: 2 days ago; sudden    Associated Symptoms:  nausea    Pain Severity: denies pain    Temperature: denies fever     What has been tried: tylenol    LMP: NA Pregnant: NA    Recommended disposition: See PCP within 3 Days    Care advice provided, patient verbalizes understanding; denies any other questions or concerns; instructed to call back for any new or worsening symptoms. Patient/Caller agrees with recommended disposition; writer provided warm transfer to Mariano Esteban at [employer] for appointment scheduling    Attention Provider: Thank you for allowing me to participate in the care of your patient. The patient was connected to triage in response to information provided to the ECC/PSC. Please do not respond through this encounter as the response is not directed to a shared pool.       Reason for Disposition   MILD dizziness (e.g., walking normally) and has NOT been evaluated by physician for this (Exception: dizziness caused by heat exposure, sudden standing, or poor fluid intake)    Protocols used: Dizziness-ADULT-[place]

## 2023-01-12 ENCOUNTER — OFFICE VISIT (OUTPATIENT)
Dept: FAMILY MEDICINE CLINIC | Age: 53
End: 2023-01-12

## 2023-01-12 VITALS
SYSTOLIC BLOOD PRESSURE: 117 MMHG | DIASTOLIC BLOOD PRESSURE: 62 MMHG | OXYGEN SATURATION: 98 % | WEIGHT: 146 LBS | BODY MASS INDEX: 24.32 KG/M2 | HEIGHT: 65 IN | HEART RATE: 68 BPM | TEMPERATURE: 98.3 F

## 2023-01-12 DIAGNOSIS — J01.90 ACUTE BACTERIAL SINUSITIS: Primary | ICD-10-CM

## 2023-01-12 DIAGNOSIS — R30.0 DYSURIA: ICD-10-CM

## 2023-01-12 DIAGNOSIS — R11.0 NAUSEA: ICD-10-CM

## 2023-01-12 DIAGNOSIS — H65.92 FLUID LEVEL BEHIND TYMPANIC MEMBRANE OF LEFT EAR: ICD-10-CM

## 2023-01-12 DIAGNOSIS — B96.89 ACUTE BACTERIAL SINUSITIS: Primary | ICD-10-CM

## 2023-01-12 LAB
BILIRUBIN, POC: NORMAL
BLOOD URINE, POC: NORMAL
CLARITY, POC: CLEAR
COLOR, POC: NORMAL
GLUCOSE URINE, POC: NORMAL
KETONES, POC: NORMAL
LEUKOCYTE EST, POC: NORMAL
NITRITE, POC: NORMAL
PH, POC: 6
PROTEIN, POC: NORMAL
SPECIFIC GRAVITY, POC: <=1.005
UROBILINOGEN, POC: NORMAL

## 2023-01-12 RX ORDER — LEVOFLOXACIN 500 MG/1
500 TABLET, FILM COATED ORAL DAILY
Qty: 10 TABLET | Refills: 0 | Status: SHIPPED | OUTPATIENT
Start: 2023-01-12 | End: 2023-01-22

## 2023-01-12 RX ORDER — PREDNISONE 20 MG/1
40 TABLET ORAL DAILY
Qty: 6 TABLET | Refills: 0 | Status: SHIPPED | OUTPATIENT
Start: 2023-01-12 | End: 2023-01-15

## 2023-01-12 ASSESSMENT — PATIENT HEALTH QUESTIONNAIRE - PHQ9
6. FEELING BAD ABOUT YOURSELF - OR THAT YOU ARE A FAILURE OR HAVE LET YOURSELF OR YOUR FAMILY DOWN: 0
SUM OF ALL RESPONSES TO PHQ9 QUESTIONS 1 & 2: 0
SUM OF ALL RESPONSES TO PHQ QUESTIONS 1-9: 6
SUM OF ALL RESPONSES TO PHQ QUESTIONS 1-9: 6
2. FEELING DOWN, DEPRESSED OR HOPELESS: 0
SUM OF ALL RESPONSES TO PHQ QUESTIONS 1-9: 6
3. TROUBLE FALLING OR STAYING ASLEEP: 3
4. FEELING TIRED OR HAVING LITTLE ENERGY: 3
9. THOUGHTS THAT YOU WOULD BE BETTER OFF DEAD, OR OF HURTING YOURSELF: 0
SUM OF ALL RESPONSES TO PHQ QUESTIONS 1-9: 6
10. IF YOU CHECKED OFF ANY PROBLEMS, HOW DIFFICULT HAVE THESE PROBLEMS MADE IT FOR YOU TO DO YOUR WORK, TAKE CARE OF THINGS AT HOME, OR GET ALONG WITH OTHER PEOPLE: 0
1. LITTLE INTEREST OR PLEASURE IN DOING THINGS: 0
5. POOR APPETITE OR OVEREATING: 0
8. MOVING OR SPEAKING SO SLOWLY THAT OTHER PEOPLE COULD HAVE NOTICED. OR THE OPPOSITE, BEING SO FIGETY OR RESTLESS THAT YOU HAVE BEEN MOVING AROUND A LOT MORE THAN USUAL: 0
7. TROUBLE CONCENTRATING ON THINGS, SUCH AS READING THE NEWSPAPER OR WATCHING TELEVISION: 0

## 2023-01-12 ASSESSMENT — ENCOUNTER SYMPTOMS
COUGH: 0
SHORTNESS OF BREATH: 0
SINUS PRESSURE: 1
WHEEZING: 0
SORE THROAT: 0
NAUSEA: 1

## 2023-01-12 NOTE — PROGRESS NOTES
Subjective:      Patient ID: Waqas Pacheco is a 45 y.o. female who presents today for:     Chief Complaint   Patient presents with    Dizziness     Patient presents today c/o dizziness x a few days. Nausea        HPI Pt in today to f/u on dizziness and nausea. She reports that she was sick about 3 weeks ago and had been improving. She reports that she has been having nausea a few days. She reports that two times recently she turned her head and had two episodes of dizziness. It has currently subsided. She reports that she did notice mild left ear pain today. She also reports frequent UTIs and recently having some urinary frequency and intermittent burning. She reports that frequency is not new she drinks a lot of water. Would like checked for UTI. Past Medical History:   Diagnosis Date    Abnormal Pap smear of cervix     Allergic rhinitis     Cerebrovascular disease 2016    Chronic back pain 9/22/2019    Depression     Depression with anxiety     Depression with anxiety     Fibromyalgia 2019? GERD (gastroesophageal reflux disease)     Headache 1987    Hyperthyroidism 1995?     Hypothyroidism 1994    Not sure of exact date    Iron deficiency anemia 04/12/2019    Irritable bowel syndrome     Osteoarthritis 1/2022    Stroke ([facility] [address] 05/2016    Idiopathic she is still in process     Thyroid disease      Past Surgical History:   Procedure Laterality Date    BLADDER SURGERY      done by Dr. Marvin Perez x10 yrs    COLONOSCOPY  03/04/2019    KERRY AMADO MD    DILATION AND CURETTAGE      ENDOMETRIAL ABLATION      TONSILLECTOMY       Family History   Problem Relation Age of Onset    Breast Cancer Neg Hx      Social History     Socioeconomic History    Marital status:      Spouse name: Not on file    Number of children: Not on file    Years of education: Not on file    Highest education level: Not on file   Occupational History    Not on file   Tobacco Use    Smoking status: Former     Packs/day: 0.25     Years: 3.00     Pack years: 0.75     Types: Cigarettes     Start date: 1988     Quit date: 1990     Years since quittin.5    Smokeless tobacco: Never   Vaping Use    Vaping Use: Never used   Substance and Sexual Activity    Alcohol use: Yes     Comment: ocassionally    Drug use: No    Sexual activity: Yes     Partners: Female   Other Topics Concern    Not on file   Social History Narrative    Not on file     Social Determinants of Health     Financial Resource Strain: Low Risk     Difficulty of Paying Living Expenses: Not hard at all   Food Insecurity: No Food Insecurity    Worried About Running Out of Food in the Last Year: Never true    Ran Out of Food in the Last Year: Never true   Transportation Needs: Not on file   Physical Activity: Not on file   Stress: Not on file   Social Connections: Not on file   Intimate Partner Violence: Not on file   Housing Stability: Not on file     Current Outpatient Medications on File Prior to Visit   Medication Sig Dispense Refill    atorvastatin (LIPITOR) 40 MG tablet Take 1 tablet by mouth daily 90 tablet 1    acetaminophen (TYLENOL) 500 MG tablet Take 500 mg by mouth every 6 hours as needed for Pain      levothyroxine (SYNTHROID) 100 MCG tablet Take 1 tablet by mouth daily 90 tablet 3    nitrofurantoin (MACRODANTIN) 50 MG capsule Oral: 50 mg as a single dose taken within 2 hours of sexual intercourse 90 capsule 1    aspirin 81 MG tablet Take 81 mg by mouth Takes 2 tablets daily      lidocaine (LMX) 4 % cream Apply a half dollar sized amount to intact skin topically up to twice daily as needed for pain (Patient not taking: Reported on 2023) 45 g 1    fluticasone (FLONASE) 50 MCG/ACT nasal spray 1 spray by Each Nostril route daily as needed  (Patient not taking: Reported on 2023)       No current facility-administered medications on file prior to visit.       Allergies:  Amitriptyline, Amoxicillin, Nsaids, and Pcn [penicillins]    Review of Systems  Constitutional:  Negative for chills, fatigue and fever. HENT:  Positive for congestion, ear pain and sinus pressure. Negative for sore throat. Respiratory:  Negative for cough, shortness of breath and wheezing. Gastrointestinal:  Positive for nausea. Genitourinary:  Positive for dysuria and frequency. Neurological:  Positive for dizziness and light-headedness. Objective:   /62 (Site: Left Upper Arm, Position: Sitting, Cuff Size: Medium Adult)   Pulse 68   Temp 98.3 °F (36.8 °C) (Temporal)   Ht 5' 5\" (1.651 m)   Wt 146 lb (66.2 kg)   LMP 03/18/2019 (Approximate)   SpO2 98%   BMI 24.30 kg/m²     Physical Exam  Constitutional:       Appearance: She is well-developed. HENT:      Head: Normocephalic. Right Ear: Ear canal and external ear normal. A middle ear effusion is present. Left Ear: Ear canal and external ear normal. A middle ear effusion is present. Nose: Nose normal.      Left Nostril: Occlusion present. Mouth/Throat:      Mouth: Mucous membranes are moist.      Pharynx: Oropharynx is clear. Uvula midline. Eyes:      General:         Right eye: No discharge. Left eye: No discharge. Conjunctiva/sclera: Conjunctivae normal.   Cardiovascular:      Rate and Rhythm: Normal rate and regular rhythm. Heart sounds: Normal heart sounds. Pulmonary:      Effort: Pulmonary effort is normal. No respiratory distress. Breath sounds: Normal breath sounds. Abdominal:      General: Bowel sounds are normal.      Palpations: Abdomen is soft. Tenderness: There is no abdominal tenderness. Musculoskeletal:      Cervical back: Normal range of motion. Lymphadenopathy:      Cervical: No cervical adenopathy. Skin:     General: Skin is warm and dry. Neurological:      Mental Status: She is alert and oriented to person, place, and time.    Psychiatric:         Mood and Affect: Mood normal.         Behavior: Behavior normal.       Assessment: Diagnosis Orders   1. Acute bacterial sinusitis  levoFLOXacin (LEVAQUIN) 500 MG tablet    predniSONE (DELTASONE) 20 MG tablet      2. Fluid level behind tympanic membrane of left ear  levoFLOXacin (LEVAQUIN) 500 MG tablet    predniSONE (DELTASONE) 20 MG tablet      3. Nausea        4. Dysuria  Culture, Urine    POCT Urinalysis No Micro (Auto)          Plan:      Orders Placed This Encounter   Procedures    Culture, Urine     Standing Status:   Future     Standing Expiration Date:   1/12/2024     Order Specific Question:   Specify (ex-cath, midstream, cysto, etc)? Answer:   midstream    POCT Urinalysis No Micro (Auto)          Orders Placed This Encounter   Medications    levoFLOXacin (LEVAQUIN) 500 MG tablet     Sig: Take 1 tablet by mouth daily for 10 days     Dispense:  10 tablet     Refill:  0    predniSONE (DELTASONE) 20 MG tablet     Sig: Take 2 tablets by mouth daily for 3 days     Dispense:  6 tablet     Refill:  0     1. Acute bacterial sinusitis  Encouraged increase in fluids and rest. Ibuprofen and tylenol as needed. Discussed otc comfort care. - levoFLOXacin (LEVAQUIN) 500 MG tablet; Take 1 tablet by mouth daily for 10 days  Dispense: 10 tablet; Refill: 0  - predniSONE (DELTASONE) 20 MG tablet; Take 2 tablets by mouth daily for 3 days  Dispense: 6 tablet; Refill: 0    2. Fluid level behind tympanic membrane of left ear    - levoFLOXacin (LEVAQUIN) 500 MG tablet; Take 1 tablet by mouth daily for 10 days  Dispense: 10 tablet; Refill: 0  - predniSONE (DELTASONE) 20 MG tablet; Take 2 tablets by mouth daily for 3 days  Dispense: 6 tablet; Refill: 0    3. Nausea  Likely related to drainage, but will check for UTI as she is prone to them. 4. Dysuria    - Culture, Urine; Future  - POCT Urinalysis No Micro (Auto)      Return if symptoms worsen or fail to improve. Reviewed with the patient: current clinicalstatus, medications, activities and diet.      Side effects, adverse effects of the medication prescribedtoday, as well as treatment plan/ rationale and result expectations have been discussedwith the patient who expresses understanding and desires to proceed. Close follow upto evaluate treatment results and for coordination of care. I have reviewedthe patient's medical history in detail and updated the computerized patient record.     Luis Enrique Adkins, APRN - CNP

## 2023-01-13 LAB — URINE CULTURE, ROUTINE: NORMAL

## 2023-02-22 NOTE — TELEPHONE ENCOUNTER
"Chief Complaint  Follow-up -iron deficiency anemia    Subjective        Jessie Jordan presents to [facility]AT[facility]Y [facility]  History of Present Illness     No new health issues since last visit.   No new medications.   No new hospitalization.   Chronic health issues are stable.      Objective   Vital Signs:  /53   Pulse 62   Resp 20   Wt 57.5 kg (126 lb 12.8 oz)   SpO2 99%   BMI 21.77 kg/m²   Estimated body mass index is 21.77 kg/m² as calculated from the following:    Height as of 10/25/22: 162.6 cm (64\").    Weight as of this encounter: 57.5 kg (126 lb 12.8 oz).       BMI is within normal parameters. No other follow-up for BMI required.      Physical Exam  Vitals and nursing note reviewed.   Constitutional:       Appearance: Normal appearance.   Neurological:      General: No focal deficit present.      Mental Status: She is alert and oriented to person, place, and time. Mental status is at baseline.   Psychiatric:         Mood and Affect: Mood normal.         Behavior: Behavior normal.         Thought Content: Thought content normal.        Result Review :  The following data was reviewed by: Zaria Cummings MD on 02/22/2023:  Common labs    Common Labs 11/23/22 1/9/23 2/22/23   Glucose  93    BUN  19    Creatinine  0.72    Sodium  135 (A)    Potassium  4.5    Chloride  101    Calcium  9.6    Albumin  4.2    Total Bilirubin  0.2    Alkaline Phosphatase  60    AST (SGOT)  16    ALT (SGPT)  9    WBC 9.62  7.83   Hemoglobin 11.7 (A)  11.8 (A)   Hematocrit 35.7  36.3   Platelets 308  326   (A) Abnormal value            CMP    CMP 7/5/22 1/9/23   Glucose 168 (A) 93   BUN 9 19   Creatinine 0.77 0.72   eGFR 82.6 89.0   Sodium 140 135 (A)   Potassium 4.0 4.5   Chloride 103 101   Calcium 9.2 9.6   Total Protein 6.7 7.0   Albumin 4.10 4.2   Globulin 2.6 2.8   Total Bilirubin 0.3 0.2   Alkaline Phosphatase 82 60   AST (SGOT) 26 16   ALT (SGPT) 13 9   Albumin/Globulin " Patient contacted Jayleen Perdomo to try and get assistance with Trintellix. Pt does not want to try any other medication because it works so well. She will need paperwork filled out and faxed. She will try to get it to us tomorrow. Ratio 1.6 1.5   BUN/Creatinine Ratio 11.7 26.4 (A)   Anion Gap 11.0 7.0   (A) Abnormal value       Comments are available for some flowsheets but are not being displayed.           CBC    CBC 8/26/22 11/23/22 2/22/23   WBC 9.06 9.62 7.83   RBC 4.01 4.30 4.33   Hemoglobin 11.7 (A) 11.7 (A) 11.8 (A)   Hematocrit 35.4 35.7 36.3   MCV 88.3 83.0 83.8   MCH 29.2 27.2 27.3   MCHC 33.1 32.8 32.5   RDW 13.5 14.3 14.4   Platelets 350 308 326   (A) Abnormal value            CBC w/diff    CBC w/Diff 8/26/22 11/23/22 2/22/23   WBC 9.06 9.62 7.83   RBC 4.01 4.30 4.33   Hemoglobin 11.7 (A) 11.7 (A) 11.8 (A)   Hematocrit 35.4 35.7 36.3   MCV 88.3 83.0 83.8   MCH 29.2 27.2 27.3   MCHC 33.1 32.8 32.5   RDW 13.5 14.3 14.4   Platelets 350 308 326   (A) Abnormal value            Anemia labs:      Lab 02/22/23  1357   IRON 54   IRON SATURATION 16*   TIBC 347   TRANSFERRIN 233       Jessie Jordan reports a pain score of 8.  Given her pain assessment as noted, treatment options were discussed and the following options were decided upon as a follow-up plan to address the patient's pain: referral to Primary Care for assistance in pain treatment guidance.    Patient screened negative for depression based on a PHQ-9 score of 2 on 2/22/2023.     Advance Care Planning   ACP discussion was declined by the patient. Patient does not have an advance directive, declines further assistance.         Assessment and Plan   Diagnoses and all orders for this visit:    1. Iron deficiency anemia due to chronic blood loss (Primary)  -     CBC (No Diff); Future  -     Ferritin; Future  -     Iron Profile; Future    2. Iron malabsorption    Chronic, stable.  S/p IV iron treatment.  Hemoglobin and iron studies are mildly low.  Not low enough to warrant IV iron.  Recommend continue monitoring.  CBC, ferritin, iron profile in 4 months.         Follow Up   No follow-ups on file.  Patient was given instructions and counseling regarding her condition or for  health maintenance advice. Please see specific information pulled into the AVS if appropriate.

## 2023-02-28 ENCOUNTER — NURSE TRIAGE (OUTPATIENT)
Dept: OTHER | Facility: CLINIC | Age: 53
End: 2023-02-28

## 2023-02-28 SDOH — ECONOMIC STABILITY: FOOD INSECURITY: WITHIN THE PAST 12 MONTHS, THE FOOD YOU BOUGHT JUST DIDN'T LAST AND YOU DIDN'T HAVE MONEY TO GET MORE.: NEVER TRUE

## 2023-02-28 SDOH — ECONOMIC STABILITY: FOOD INSECURITY: WITHIN THE PAST 12 MONTHS, YOU WORRIED THAT YOUR FOOD WOULD RUN OUT BEFORE YOU GOT MONEY TO BUY MORE.: NEVER TRUE

## 2023-02-28 SDOH — ECONOMIC STABILITY: HOUSING INSECURITY
IN THE LAST 12 MONTHS, WAS THERE A TIME WHEN YOU DID NOT HAVE A STEADY PLACE TO SLEEP OR SLEPT IN A SHELTER (INCLUDING NOW)?: NO

## 2023-02-28 SDOH — ECONOMIC STABILITY: INCOME INSECURITY: HOW HARD IS IT FOR YOU TO PAY FOR THE VERY BASICS LIKE FOOD, HOUSING, MEDICAL CARE, AND HEATING?: NOT HARD AT ALL

## 2023-02-28 NOTE — TELEPHONE ENCOUNTER
Location of patient: Tolu Martinez call from [employer] [facility] [employer] with [employer]. Current Symptoms: intermittent dizziness with right ear pain    Speech is clear, speaking in complete sentences    Onset: 3 weeks ago;     Pain Severity: chronic fibromyalgia pain    Temperature: denies     What has been tried: drinking water      Denies - fainting / numbness or weakness on one side of the body / heart palpitations / double vision / loss of vision    Recommended disposition: See PCP within 3 Days    Care advice provided, patient verbalizes understanding; denies any other questions or concerns; instructed to call back for any new or worsening symptoms. Patient/Caller agrees with recommended disposition; writer provided warm transfer to [employer] [facility] [employer] for appointment scheduling    Attention Provider: Thank you for allowing me to participate in the care of your patient. The patient was connected to triage in response to information provided to the ECC/PSC. Please do not respond through this encounter as the response is not directed to a shared pool.       Reason for Disposition   MILD dizziness (e.g., walking normally) and has NOT been evaluated by physician for this (Exception: dizziness caused by heat exposure, sudden standing, or poor fluid intake)    Protocols used: Dizziness-ADULT-[place]

## 2023-03-01 ENCOUNTER — OFFICE VISIT (OUTPATIENT)
Dept: FAMILY MEDICINE CLINIC | Age: 53
End: 2023-03-01
Payer: COMMERCIAL

## 2023-03-01 VITALS
SYSTOLIC BLOOD PRESSURE: 120 MMHG | TEMPERATURE: 97.9 F | HEIGHT: 65 IN | HEART RATE: 56 BPM | WEIGHT: 146 LBS | BODY MASS INDEX: 24.32 KG/M2 | RESPIRATION RATE: 16 BRPM | DIASTOLIC BLOOD PRESSURE: 80 MMHG | OXYGEN SATURATION: 97 %

## 2023-03-01 DIAGNOSIS — E04.1 THYROID NODULE: ICD-10-CM

## 2023-03-01 DIAGNOSIS — R42 DIZZINESS: Primary | ICD-10-CM

## 2023-03-01 DIAGNOSIS — E53.8 VITAMIN B12 DEFICIENCY: ICD-10-CM

## 2023-03-01 DIAGNOSIS — R73.03 PREDIABETES: ICD-10-CM

## 2023-03-01 DIAGNOSIS — E78.2 MIXED HYPERLIPIDEMIA: ICD-10-CM

## 2023-03-01 DIAGNOSIS — R00.1 BRADYCARDIA: ICD-10-CM

## 2023-03-01 DIAGNOSIS — E55.9 VITAMIN D DEFICIENCY: ICD-10-CM

## 2023-03-01 DIAGNOSIS — E03.9 ACQUIRED HYPOTHYROIDISM: ICD-10-CM

## 2023-03-01 LAB — HBA1C MFR BLD: 5.5 %

## 2023-03-01 PROCEDURE — 99214 OFFICE O/P EST MOD 30 MIN: CPT | Performed by: NURSE PRACTITIONER

## 2023-03-01 PROCEDURE — 83036 HEMOGLOBIN GLYCOSYLATED A1C: CPT | Performed by: NURSE PRACTITIONER

## 2023-03-01 PROCEDURE — 93000 ELECTROCARDIOGRAM COMPLETE: CPT | Performed by: NURSE PRACTITIONER

## 2023-03-01 NOTE — PROGRESS NOTES
Subjective:     Patient ID: Sally Corado is a 52 y.o. female who presentstoday for:  Chief Complaint   Patient presents with    Dizziness     Patient was here in January and was diagnosed with sinusitis and was prescribed antibiotics and states now she is having pulsing in her right ear and states the other day she was sitting and was dizzy and had to hold on to her chair. Dizziness  This is a recurrent problem. The current episode started more than 1 month ago. The problem occurs intermittently. The problem has been waxing and waning. Pertinent negatives include no abdominal pain, anorexia, arthralgias, change in bowel habit, chest pain, chills, congestion, coughing, diaphoresis, fatigue, fever, headaches, joint swelling, myalgias, nausea, neck pain, numbness, rash, sore throat, swollen glands, urinary symptoms, visual change, vomiting or weakness. Exacerbated by: position. Treatments tried: antibiotics for sinus infection. The treatment provided mild relief. Past Medical History:   Diagnosis Date    Abnormal Pap smear of cervix     Allergic rhinitis     Cerebrovascular disease 2016    Chronic back pain 9/22/2019    Depression     Depression with anxiety     Depression with anxiety     Fibromyalgia 2019? GERD (gastroesophageal reflux disease)     Headache 1987    Hyperthyroidism 1995?     Hypothyroidism 1994    Not sure of exact date    Iron deficiency anemia 04/12/2019    Irritable bowel syndrome     Osteoarthritis 1/2022    [facility]    Idiopathic she is still in process     Thyroid disease      Current Outpatient Medications on File Prior to Visit   Medication Sig Dispense Refill    atorvastatin (LIPITOR) 40 MG tablet Take 1 tablet by mouth daily 90 tablet 1    acetaminophen (TYLENOL) 500 MG tablet Take 500 mg by mouth every 6 hours as needed for Pain      lidocaine (LMX) 4 % cream Apply a half dollar sized amount to intact skin topically up to twice daily as needed for pain 45 g 1 levothyroxine (SYNTHROID) 100 MCG tablet Take 1 tablet by mouth daily 90 tablet 3    nitrofurantoin (MACRODANTIN) 50 MG capsule Oral: 50 mg as a single dose taken within 2 hours of sexual intercourse 90 capsule 1    fluticasone (FLONASE) 50 MCG/ACT nasal spray 1 spray by Each Nostril route daily as needed      aspirin 81 MG tablet Take 81 mg by mouth Takes 2 tablets daily       No current facility-administered medications on file prior to visit. Past Surgical History:   Procedure Laterality Date    BLADDER SURGERY      done by Dr. THOMAS [facility] x10 yrs    COLONOSCOPY  2019    KERRY AMADO MD    DILATION AND CURETTAGE      ENDOMETRIAL ABLATION      TONSILLECTOMY          Family History   Problem Relation Age of Onset    Breast Cancer Neg Hx      Social History     Socioeconomic History    Marital status:      Spouse name: Not on file    Number of children: Not on file    Years of education: Not on file    Highest education level: Not on file   Occupational History    Not on file   Tobacco Use    Smoking status: Former     Packs/day: 0.25     Years: 3.00     Pack years: 0.75     Types: Cigarettes     Start date: 1988     Quit date: 1990     Years since quittin.6    Smokeless tobacco: Never   Vaping Use    Vaping Use: Never used   Substance and Sexual Activity    Alcohol use: Yes     Comment: ocassionally    Drug use: No    Sexual activity: Yes     Partners: Female   Other Topics Concern    Not on file   Social History Narrative    Not on file     Social Determinants of Health     Financial Resource Strain: Low Risk     Difficulty of Paying Living Expenses: Not hard at all   Food Insecurity: No Food Insecurity    Worried About [address] in the Last Year: Never true    [address] in the Last Year: Never true   Transportation Needs: Unknown    Lack of Transportation (Medical): Not on file    Lack of Transportation (Non-Medical):  No   Physical Activity: Not on file   Stress: Not on file Social Connections: Not on file   Intimate Partner Violence: Not on file   Housing Stability: Unknown    Unable to Pay for Housing in the Last Year: Not on file    Number of [place] in the Last Year: Not on file    Unstable Housing in the Last Year: No     Allergies:  Amitriptyline, Amoxicillin, Nsaids, and Pcn [penicillins]    Review of Systems   Constitutional:  Negative for chills, diaphoresis, fatigue and fever. HENT:  Negative for congestion, sore throat and trouble swallowing. Eyes:  Negative for visual disturbance. Respiratory:  Negative for cough. Cardiovascular:  Negative for chest pain. Gastrointestinal:  Negative for abdominal pain, anorexia, change in bowel habit, nausea and vomiting. Genitourinary:  Negative for difficulty urinating. Musculoskeletal:  Negative for arthralgias, joint swelling, myalgias and neck pain. Skin:  Negative for rash. Neurological:  Positive for dizziness. Negative for syncope, speech difficulty, weakness, numbness and headaches. Objective:    /80 (Site: Left Upper Arm, Position: Sitting, Cuff Size: Medium Adult)   Pulse 56   Temp 97.9 °F (36.6 °C) (Infrared)   Resp 16   Ht 5' 5\" (1.651 m)   Wt 146 lb (66.2 kg)   LMP 03/18/2019 (Approximate)   SpO2 97%   BMI 24.30 kg/m²     Physical Exam  Constitutional:       General: She is not in acute distress. Appearance: She is well-developed. She is not diaphoretic. HENT:      Head: Normocephalic and atraumatic. Right Ear: External ear normal.      Left Ear: External ear normal.      Mouth/Throat:      Mouth: Mucous membranes are moist.   Eyes:      Extraocular Movements: Extraocular movements intact. Conjunctiva/sclera: Conjunctivae normal.      Pupils: Pupils are equal, round, and reactive to light. Cardiovascular:      Rate and Rhythm: Normal rate and regular rhythm. Heart sounds: Normal heart sounds.    Pulmonary:      Effort: Pulmonary effort is normal. No respiratory distress. Breath sounds: Normal breath sounds. No wheezing. Abdominal:      General: Bowel sounds are normal.      Palpations: Abdomen is soft. Tenderness: There is no abdominal tenderness. Musculoskeletal:         General: Normal range of motion. Cervical back: Normal range of motion and neck supple. No tenderness. Right lower leg: No edema. Left lower leg: No edema. Lymphadenopathy:      Cervical: No cervical adenopathy. Skin:     General: Skin is warm and dry. Findings: No rash. Neurological:      General: No focal deficit present. Mental Status: She is alert and oriented to person, place, and time. Cranial Nerves: No cranial nerve deficit. Sensory: No sensory deficit. Motor: No weakness. Coordination: Coordination normal.      Gait: Gait normal.   Psychiatric:         Mood and Affect: Mood normal.         Behavior: Behavior normal.         Thought Content: Thought content normal.         Judgment: Judgment normal.       Assessment & Plan:       Diagnosis Orders   1. Dizziness  CBC with Auto Differential    Comprehensive Metabolic Panel    Urinalysis with Reflex to Culture    EKG 12 Lead      2. Bradycardia  [facility]      3. Prediabetes  POCT glycosylated hemoglobin (Hb A1C)      4. Mixed hyperlipidemia  CBC with Auto Differential    Comprehensive Metabolic Panel    Lipid Panel      5. Acquired hypothyroidism  TSH    T4, Free      6. Thyroid nodule  TSH    T4, Free    US HEAD NECK SOFT TISSUE THYROID      7. Vitamin D deficiency  Vitamin D 25 Hydroxy      8. Vitamin B12 deficiency  Vitamin B12 & Folate        Orders Placed This Encounter   Procedures    US HEAD NECK SOFT TISSUE THYROID     This procedure can be scheduled via [facility]. Access your [facility] account by visiting [email or web address].      Standing Status:   Future     Standing Expiration Date:   3/1/2024    CBC with Auto Differential     Standing Status:   Future     Number of Occurrences:   1     Standing Expiration Date:   3/1/2024    Comprehensive Metabolic Panel     Standing Status:   Future     Number of Occurrences:   1     Standing Expiration Date:   3/1/2024    Vitamin D 25 Hydroxy     Standing Status:   Future     Number of Occurrences:   1     Standing Expiration Date:   3/1/2024    Vitamin B12 & Folate     Standing Status:   Future     Number of Occurrences:   1     Standing Expiration Date:   3/1/2024    TSH     Standing Status:   Future     Number of Occurrences:   1     Standing Expiration Date:   3/1/2024    T4, Free     Standing Status:   Future     Number of Occurrences:   1     Standing Expiration Date:   3/1/2024    Urinalysis with Reflex to Culture     Standing Status:   Future     Number of Occurrences:   1     Standing Expiration Date:   3/1/2024     Order Specific Question:   SPECIFY(EX-CATH,MIDSTREAM,CYSTO,ETC)? Answer:   midstream    Lipid Panel     Standing Status:   Future     Number of Occurrences:   1     Standing Expiration Date:   3/1/2024     Order Specific Question:   Is Patient Fasting?/# of Hours     Answer:   Veronica Aragon  Cardiology, [facility]     Referral Priority:   Routine     Referral Type:   Eval and Treat     Referral Reason:   Specialty Services Required     Requested Specialty:   Cardiology     Number of Visits Requested:   1    POCT glycosylated hemoglobin (Hb A1C)    EKG 12 Lead     Order Specific Question:   Reason for Exam?     Answer:   Dizziness     No orders of the defined types were placed in this encounter. There are no discontinued medications. Return in about 6 weeks (around 4/12/2023) for PCP follow-up. Reviewed with the patient: currentclinical status, medications, activities and diet. Side effects, adverse effects of the medicationprescribed today, as well as treatment plan/ rationale and result expectations havebeen discussed with the patient who expresses understanding and desires to proceed. Pt instructions reviewed and given to patient. Close follow up to evaluate treatment resultsand for coordination of care. I have reviewed the patient's medical historyin detail and updated the computerized patient record.     TANIYA Bliss - CNP

## 2023-03-02 ENCOUNTER — PATIENT MESSAGE (OUTPATIENT)
Dept: FAMILY MEDICINE CLINIC | Age: 53
End: 2023-03-02

## 2023-03-02 DIAGNOSIS — R42 DIZZINESS: ICD-10-CM

## 2023-03-02 DIAGNOSIS — E55.9 VITAMIN D DEFICIENCY: ICD-10-CM

## 2023-03-02 DIAGNOSIS — E04.1 THYROID NODULE: ICD-10-CM

## 2023-03-02 DIAGNOSIS — E53.8 VITAMIN B12 DEFICIENCY: ICD-10-CM

## 2023-03-02 DIAGNOSIS — E03.9 ACQUIRED HYPOTHYROIDISM: ICD-10-CM

## 2023-03-02 LAB
ALBUMIN SERPL-MCNC: 4.4 G/DL (ref 3.5–4.6)
ALP BLD-CCNC: 58 U/L (ref 40–130)
ALT SERPL-CCNC: 14 U/L (ref 0–33)
ANION GAP SERPL CALCULATED.3IONS-SCNC: 12 MEQ/L (ref 9–15)
AST SERPL-CCNC: 23 U/L (ref 0–35)
ATYPICAL LYMPHOCYTE RELATIVE PERCENT: 9 %
BASOPHILS ABSOLUTE: 0 K/UL (ref 0–0.2)
BASOPHILS RELATIVE PERCENT: 0.6 %
BILIRUB SERPL-MCNC: 0.3 MG/DL (ref 0.2–0.7)
BILIRUBIN URINE: NEGATIVE
BLOOD, URINE: NEGATIVE
BUN BLDV-MCNC: 11 MG/DL (ref 6–20)
CALCIUM SERPL-MCNC: 9.5 MG/DL (ref 8.5–9.9)
CHLORIDE BLD-SCNC: 104 MEQ/L (ref 95–107)
CHOLESTEROL, TOTAL: 168 MG/DL (ref 0–199)
CLARITY: CLEAR
CO2: 26 MEQ/L (ref 20–31)
COLOR: YELLOW
CREAT SERPL-MCNC: 0.77 MG/DL (ref 0.5–0.9)
EOSINOPHILS ABSOLUTE: 0 K/UL (ref 0–0.7)
EOSINOPHILS RELATIVE PERCENT: 1.9 %
GFR SERPL CREATININE-BSD FRML MDRD: >60 ML/MIN/{1.73_M2}
GLOBULIN: 2.9 G/DL (ref 2.3–3.5)
GLUCOSE BLD-MCNC: 90 MG/DL (ref 70–99)
GLUCOSE URINE: NEGATIVE MG/DL
HCT VFR BLD CALC: 39.3 % (ref 37–47)
HDLC SERPL-MCNC: 106 MG/DL (ref 40–59)
HEMOGLOBIN: 13.4 G/DL (ref 12–16)
KETONES, URINE: NEGATIVE MG/DL
LDL CHOLESTEROL CALCULATED: 50 MG/DL (ref 0–129)
LEUKOCYTE ESTERASE, URINE: NEGATIVE
LYMPHOCYTES ABSOLUTE: 2.7 K/UL (ref 1–4.8)
LYMPHOCYTES RELATIVE PERCENT: 31 %
MCH RBC QN AUTO: 30.9 PG (ref 27–31.3)
MCHC RBC AUTO-ENTMCNC: 34.2 % (ref 33–37)
MCV RBC AUTO: 90.6 FL (ref 79.4–94.8)
MONOCYTES ABSOLUTE: 0.2 K/UL (ref 0.2–0.8)
MONOCYTES RELATIVE PERCENT: 2.8 %
NEUTROPHILS ABSOLUTE: 3.9 K/UL (ref 1.4–6.5)
NEUTROPHILS RELATIVE PERCENT: 58 %
NITRITE, URINE: NEGATIVE
PDW BLD-RTO: 12.8 % (ref 11.5–14.5)
PH UA: 6.5 (ref 5–9)
PLATELET # BLD: 218 K/UL (ref 130–400)
PLATELET SLIDE REVIEW: NORMAL
POTASSIUM SERPL-SCNC: 4.1 MEQ/L (ref 3.4–4.9)
PROTEIN UA: NEGATIVE MG/DL
RBC # BLD: 4.34 M/UL (ref 4.2–5.4)
SODIUM BLD-SCNC: 142 MEQ/L (ref 135–144)
SPECIFIC GRAVITY UA: 1 (ref 1–1.03)
T4 FREE: 1.47 NG/DL (ref 0.84–1.68)
TOTAL PROTEIN: 7.3 G/DL (ref 6.3–8)
TRIGL SERPL-MCNC: 58 MG/DL (ref 0–150)
TSH SERPL DL<=0.05 MIU/L-ACNC: 1.62 UIU/ML (ref 0.44–3.86)
URINE REFLEX TO CULTURE: NORMAL
UROBILINOGEN, URINE: 0.2 E.U./DL
WBC # BLD: 6.8 K/UL (ref 4.8–10.8)

## 2023-03-02 NOTE — TELEPHONE ENCOUNTER
From: Alison Solis  To: Carmen Backer  Sent: 3/2/2023 3:50 PM EST  Subject: Nuris Membreno     Just wandering if my thyroid levels are normal? Did we also check For anemia vitamin B vitamin D? Do I need to follow through with the cardiologist referral? I was thinking it might be due to my thyroid?  Thank you

## 2023-03-03 LAB
FOLATE: >20 NG/ML
VITAMIN B-12: 776 PG/ML (ref 232–1245)
VITAMIN D 25-HYDROXY: 54.7 NG/ML

## 2023-03-07 ASSESSMENT — ENCOUNTER SYMPTOMS
NAUSEA: 0
TROUBLE SWALLOWING: 0
COUGH: 0
VOMITING: 0
CHANGE IN BOWEL HABIT: 0
ABDOMINAL PAIN: 0
VISUAL CHANGE: 0
SORE THROAT: 0
SWOLLEN GLANDS: 0

## 2023-03-20 ENCOUNTER — OFFICE VISIT (OUTPATIENT)
Dept: CARDIOLOGY CLINIC | Age: 53
End: 2023-03-20
Payer: COMMERCIAL

## 2023-03-20 VITALS
DIASTOLIC BLOOD PRESSURE: 70 MMHG | OXYGEN SATURATION: 99 % | WEIGHT: 148 LBS | BODY MASS INDEX: 24.63 KG/M2 | HEART RATE: 60 BPM | SYSTOLIC BLOOD PRESSURE: 118 MMHG

## 2023-03-20 DIAGNOSIS — R55 SYNCOPE AND COLLAPSE: ICD-10-CM

## 2023-03-20 DIAGNOSIS — E03.9 HYPOTHYROIDISM, UNSPECIFIED TYPE: ICD-10-CM

## 2023-03-20 DIAGNOSIS — R00.1 BRADYCARDIA: Primary | ICD-10-CM

## 2023-03-20 DIAGNOSIS — M79.7 FIBROMYALGIA: ICD-10-CM

## 2023-03-20 DIAGNOSIS — E78.5 DYSLIPIDEMIA: ICD-10-CM

## 2023-03-20 PROCEDURE — 99204 OFFICE O/P NEW MOD 45 MIN: CPT | Performed by: INTERNAL MEDICINE

## 2023-03-20 RX ORDER — LEVOTHYROXINE SODIUM 0.1 MG/1
100 TABLET ORAL DAILY
Qty: 90 TABLET | Refills: 1 | Status: SHIPPED | OUTPATIENT
Start: 2023-03-20

## 2023-03-20 ASSESSMENT — ENCOUNTER SYMPTOMS
SHORTNESS OF BREATH: 0
GASTROINTESTINAL NEGATIVE: 1
STRIDOR: 0
CHEST TIGHTNESS: 0
WHEEZING: 0
EYES NEGATIVE: 1
BLOOD IN STOOL: 0
NAUSEA: 0
RESPIRATORY NEGATIVE: 1
COUGH: 0

## 2023-03-20 NOTE — PROGRESS NOTES
Fibromyalgia    Attention deficit    Urinary tract infection without hematuria    Mixed hyperlipidemia       There are no discontinued medications. Modified Medications    Modified Medication Previous Medication    LEVOTHYROXINE (SYNTHROID) 100 MCG TABLET levothyroxine (SYNTHROID) 100 MCG tablet       Take 1 tablet by mouth daily    Take 1 tablet by mouth daily       No orders of the defined types were placed in this encounter. Assessment/Plan:    1. Bradycardia     - LONGTERM CONTINUOUS CARDIAC EVENT MONITOR (ZIO); Future  - Echo 2D w doppler w color complete; Future  - CARDIAC STRESS TEST EXERCISE ONLY; Future    2. Syncope and collapse     3. Dyslipidemia  Statin low fat diet f/Iu labs     4. Fibromyalgia   F/u Rheum      Counseling:  Heart Healthy Lifestyle, Low Salt Diet, Take Precautions to Prevent Falls, and Walk Daily    Return for AFTER TESTS.       Electronically signed by Sergey Monzon MD on 3/20/2023 at 2:11 PM

## 2023-03-20 NOTE — TELEPHONE ENCOUNTER
Future Appointments    Encounter Information    Provider Department Appt Notes   3/20/2023 Sharita James MD [facility] Cardiology referred by Katarina Grimes CNP for bradycardia   4/14/2023 Isauro Kiser, [address] [facility] 6 wk f/u     Past Visits    Date Provider Specialty Visit Type Primary Dx   03/01/2023 TANIYA Junior - CNP Family Medicine Office Visit Dizziness   01/12/2023 TANIYA Tirado - CNP Family Medicine Office Visit Acute bacterial sinusitis   10/27/2022 Isauro Kiser DO Family Medicine Office Visit Encounter to establish care with new doctor

## 2023-03-21 DIAGNOSIS — R00.1 BRADYCARDIA: ICD-10-CM

## 2023-03-30 ENCOUNTER — TELEPHONE (OUTPATIENT)
Dept: CARDIOLOGY CLINIC | Age: 53
End: 2023-03-30

## 2023-03-30 NOTE — TELEPHONE ENCOUNTER
Pt calling in regards to [facility] monitor. Pt had it put on 3/20 and has been having itching and a small rash form from the adhesive. She had been wearing it for 10 days and asked to take it off, since her end date was Sunday.  Pt said she was going to take it off because of the discomfort

## 2023-04-02 DIAGNOSIS — N39.0 RECURRENT UTI: ICD-10-CM

## 2023-04-02 NOTE — TELEPHONE ENCOUNTER
Rx requested:  Requested Prescriptions     Pending Prescriptions Disp Refills    nitrofurantoin (MACRODANTIN) 50 MG capsule 90 capsule 1     Sig: Oral: 50 mg as a single dose taken within 2 hours of sexual intercourse         Last Office Visit:   10/27/2022      Next Visit Date:  Future Appointments   Date Time Provider Almaz Marte   4/14/2023  9:30 AM Ricki Nageotte, DO [facility]   4/27/2023 11:15 AM [facility] ECHO  [facility]   4/27/2023 12:30 PM [facility] STRESS ROOM 1 [facility] STRESS [facility] Fac RAD   5/12/2023 11:30 AM Madiha Diggs MD [facility]

## 2023-04-03 RX ORDER — NITROFURANTOIN MACROCRYSTALS 50 MG/1
CAPSULE ORAL
Qty: 90 CAPSULE | Refills: 1 | Status: SHIPPED | OUTPATIENT
Start: 2023-04-03

## 2023-04-14 ENCOUNTER — TELEPHONE (OUTPATIENT)
Dept: FAMILY MEDICINE CLINIC | Age: 53
End: 2023-04-14

## 2023-04-27 ENCOUNTER — HOSPITAL ENCOUNTER (OUTPATIENT)
Dept: NON INVASIVE DIAGNOSTICS | Age: 53
Discharge: HOME | End: 2023-04-27
Payer: COMMERCIAL

## 2023-04-27 ENCOUNTER — APPOINTMENT (OUTPATIENT)
Dept: NON INVASIVE DIAGNOSTICS | Age: 53
End: 2023-04-27
Payer: COMMERCIAL

## 2023-04-27 DIAGNOSIS — R00.1 BRADYCARDIA: ICD-10-CM

## 2023-04-27 LAB
LV EF: 58 %
LVEF MODALITY: NORMAL

## 2023-04-27 PROCEDURE — 93306 TTE W/DOPPLER COMPLETE: CPT

## 2023-05-01 ENCOUNTER — HOSPITAL ENCOUNTER (OUTPATIENT)
Dept: NUCLEAR MEDICINE | Age: 53
Discharge: HOME | End: 2023-05-03
Payer: COMMERCIAL

## 2023-05-01 ENCOUNTER — HOSPITAL ENCOUNTER (OUTPATIENT)
Dept: NON INVASIVE DIAGNOSTICS | Age: 53
Discharge: HOME | End: 2023-05-01
Payer: COMMERCIAL

## 2023-05-01 DIAGNOSIS — R00.1 BRADYCARDIA: ICD-10-CM

## 2023-05-01 DIAGNOSIS — I25.10 CORONARY ARTERY DISEASE INVOLVING NATIVE CORONARY ARTERY OF NATIVE HEART WITHOUT ANGINA PECTORIS: ICD-10-CM

## 2023-05-01 PROCEDURE — 78452 HT MUSCLE IMAGE SPECT MULT: CPT

## 2023-05-01 PROCEDURE — A9502 TC99M TETROFOSMIN: HCPCS | Performed by: INTERNAL MEDICINE

## 2023-05-01 PROCEDURE — 2580000003 HC RX 258: Performed by: INTERNAL MEDICINE

## 2023-05-01 PROCEDURE — 3430000000 HC RX DIAGNOSTIC RADIOPHARMACEUTICAL: Performed by: INTERNAL MEDICINE

## 2023-05-01 PROCEDURE — 93017 CV STRESS TEST TRACING ONLY: CPT

## 2023-05-01 PROCEDURE — 6360000002 HC RX W HCPCS: Performed by: INTERNAL MEDICINE

## 2023-05-01 RX ORDER — SODIUM CHLORIDE 0.9 % (FLUSH) 0.9 %
10 SYRINGE (ML) INJECTION PRN
Status: COMPLETED | OUTPATIENT
Start: 2023-05-01 | End: 2023-05-01

## 2023-05-01 RX ADMIN — TETROFOSMIN 35.3 MILLICURIE: 1.38 INJECTION, POWDER, LYOPHILIZED, FOR SOLUTION INTRAVENOUS at 09:26

## 2023-05-01 RX ADMIN — Medication 10 ML: at 09:26

## 2023-05-01 RX ADMIN — Medication 10 ML: at 09:27

## 2023-05-01 RX ADMIN — Medication 10 ML: at 08:06

## 2023-05-01 RX ADMIN — REGADENOSON 0.4 MG: 0.08 INJECTION, SOLUTION INTRAVENOUS at 09:26

## 2023-05-01 RX ADMIN — TETROFOSMIN 11.9 MILLICURIE: 1.38 INJECTION, POWDER, LYOPHILIZED, FOR SOLUTION INTRAVENOUS at 08:06

## 2023-05-01 NOTE — PROGRESS NOTES
Reviewed history, allergies, and medications. Patient took her home medications as normal prior to testing. Consent confirmed. Lexiscan exam explained. Placed patient on monitor. @301 Nuclear Medicine tech here to inject Guerrero Singh. SOB noted during recovery phase. Denied chest pain. No ectopy noted. Doctor to further review and interpret results. Patient off monitor and instructed to eat, will have last part of exam in 1 hour.

## 2023-05-02 NOTE — PROCEDURES
[address]                      [address] [place], [address] [facility]                              CARDIAC STRESS TEST    PATIENT NAME: Arabella Moya                   :        1970  MED REC NO:   [id number]                            ROOM:  ACCOUNT NO:   [de-identified]                           ADMIT DATE: 2023  PROVIDER:     Moise Mcmahan DO    CARDIOVASCULAR DIAGNOSTIC DEPARTMENT    DATE OF STUDY:  2023    Letty Rowell MYOCARDIAL PERFUSION STRESS TEST    ORDERING PROVIDER:  Yuriy Sterling MD    PRIMARY CARE PROVIDER:  Zully Galeano MD    EXAM TYPE:  Stress Myocardial Perfusion Regadenosin 1-day. REASON FOR EXAM:  Bradycardia and abnormal EKG. PROCEDURE DESCRIPTION:  The patient was injected intravenously at rest  with technetium-99m tetrofosmin followed by resting SPECT myocardial  perfusion imaging and then underwent stress protocol using regadenoson  0.4 mg injected intravenously. At that time, technetium-99m tetrofosmin  stress dose was injected intravenously and SPECT myocardial perfusion  and gated imaging were repeated. Rest dose:  11.9 mCi  Stress dose:  35.3 mCi    FINDINGS:  The stress and rest images exhibit homogeneous uptake of  tracer throughout the left ventricular myocardium. There is no evidence  of stress-induced reversible perfusion abnormalities to suggest  myocardial ischemia. Gated imaging exhibits normal left ventricular  size and normal wall motion and myocardial thickening. EKG analysis did  not demonstrate ST-segment changes nor arrhythmias concerning for  myocardial ischemia. LVEF:  73%  TID ratio:  0.92    IMPRESSION:  1. No evidence of stress-induced myocardial ischemia. 2.  Normal left ventricular systolic function.         Donald Blancas DO    D: 2023 #7:25:37       T: 2023 8:08:16     [email or web address]  Job#: [id number]     Doc#: [id number]    CC:

## 2023-05-09 DIAGNOSIS — M79.7 FIBROMYALGIA: ICD-10-CM

## 2023-05-09 RX ORDER — DULOXETIN HYDROCHLORIDE 60 MG/1
60 CAPSULE, DELAYED RELEASE ORAL DAILY
Qty: 30 CAPSULE | Refills: 1 | Status: SHIPPED | OUTPATIENT
Start: 2023-05-09 | End: 2023-06-08

## 2023-05-12 ENCOUNTER — OFFICE VISIT (OUTPATIENT)
Dept: CARDIOLOGY CLINIC | Age: 53
End: 2023-05-12
Payer: COMMERCIAL

## 2023-05-12 VITALS
DIASTOLIC BLOOD PRESSURE: 74 MMHG | HEART RATE: 70 BPM | WEIGHT: 147 LBS | OXYGEN SATURATION: 97 % | SYSTOLIC BLOOD PRESSURE: 130 MMHG | BODY MASS INDEX: 24.46 KG/M2

## 2023-05-12 DIAGNOSIS — E78.5 DYSLIPIDEMIA: ICD-10-CM

## 2023-05-12 DIAGNOSIS — M79.7 FIBROMYALGIA: ICD-10-CM

## 2023-05-12 DIAGNOSIS — R00.1 BRADYCARDIA: Primary | ICD-10-CM

## 2023-05-12 DIAGNOSIS — R55 SYNCOPE AND COLLAPSE: ICD-10-CM

## 2023-05-12 PROCEDURE — 99214 OFFICE O/P EST MOD 30 MIN: CPT | Performed by: INTERNAL MEDICINE

## 2023-05-12 ASSESSMENT — ENCOUNTER SYMPTOMS
CHEST TIGHTNESS: 0
BLOOD IN STOOL: 0
COUGH: 0
STRIDOR: 0
SHORTNESS OF BREATH: 0
RESPIRATORY NEGATIVE: 1
NAUSEA: 0
EYES NEGATIVE: 1
WHEEZING: 0
GASTROINTESTINAL NEGATIVE: 1

## 2023-05-12 NOTE — PROGRESS NOTES
OFFICE VISIT         Patient: Claudia Steele  YOB: 1970  MRN: [id number]    Chief Complaint:  Chief Complaint   Patient presents with    Results     ECHO/Stress/Zio       CV Data:  2016 CVA   4/22 CUS mild   5/23 SPECT negative  4/23 Echo EF 55-60   4/23 Zio Occ PVCs 4 Brief PSVT   Subjective/HPI  2 weeks ago while sitting in chair almost fell forward but no LOC. No cp no sob very active execises regularly. 1 year ago she had an episode where she got confused and did no know where she was going. Head MRI at the time was negative. Saw neurology. She also has Fibromyalgia. She has h/o Rowan Louis    5/12/23 still with diffuse joint aches started Cymbalta. No cp no sob still exercise. Always w memory issues. No further near syncope. Rare brief pal- tolerable. 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 [address] ETOH. Past Medical History:   Diagnosis Date    Abnormal Pap smear of cervix     Allergic rhinitis     Cerebrovascular disease 2016    Chronic back pain 9/22/2019    Depression     Depression with anxiety     Depression with anxiety     Fibromyalgia 2019? GERD (gastroesophageal reflux disease)     Headache 1987    Hyperthyroidism 1995?     Hypothyroidism 1994    Not sure of exact date    Iron deficiency anemia 04/12/2019    Irritable bowel syndrome     Osteoarthritis 1/2022    Stroke ([facility] [address] 05/2016    Idiopathic she is still in process     Thyroid disease        Past Surgical History:   Procedure Laterality Date    BLADDER SURGERY      done by Dr. Milton Martel x10 yrs    COLONOSCOPY  03/04/2019    KERRY AMADO MD    DILATION AND CURETTAGE      ENDOMETRIAL ABLATION      TONSILLECTOMY         Family History   Problem Relation Age of Onset    Breast Cancer Neg Hx        Social History     Socioeconomic History    Marital status:      Spouse name: None    Number of children: None    Years of education: None    Highest

## 2023-05-26 ENCOUNTER — OFFICE VISIT (OUTPATIENT)
Dept: FAMILY MEDICINE CLINIC | Age: 53
End: 2023-05-26
Payer: COMMERCIAL

## 2023-05-26 VITALS
DIASTOLIC BLOOD PRESSURE: 80 MMHG | OXYGEN SATURATION: 97 % | WEIGHT: 147 LBS | HEART RATE: 78 BPM | BODY MASS INDEX: 24.46 KG/M2 | TEMPERATURE: 98 F | SYSTOLIC BLOOD PRESSURE: 110 MMHG

## 2023-05-26 DIAGNOSIS — N39.46 MIXED INCONTINENCE URGE AND STRESS: ICD-10-CM

## 2023-05-26 DIAGNOSIS — M17.11 OSTEOARTHRITIS OF RIGHT KNEE, UNSPECIFIED OSTEOARTHRITIS TYPE: ICD-10-CM

## 2023-05-26 DIAGNOSIS — R13.10 DYSPHAGIA, UNSPECIFIED TYPE: Primary | ICD-10-CM

## 2023-05-26 DIAGNOSIS — M79.7 FIBROMYALGIA: ICD-10-CM

## 2023-05-26 LAB
BILIRUBIN, POC: NEGATIVE
BLOOD URINE, POC: NORMAL
CLARITY, POC: CLEAR
COLOR, POC: YELLOW
GLUCOSE URINE, POC: NEGATIVE
KETONES, POC: NEGATIVE
LEUKOCYTE EST, POC: NEGATIVE
NITRITE, POC: NEGATIVE
PH, POC: 6.5
PROTEIN, POC: NEGATIVE
SPECIFIC GRAVITY, POC: 1.01
UROBILINOGEN, POC: 0.2

## 2023-05-26 PROCEDURE — 81003 URINALYSIS AUTO W/O SCOPE: CPT | Performed by: STUDENT IN AN ORGANIZED HEALTH CARE EDUCATION/TRAINING PROGRAM

## 2023-05-26 PROCEDURE — 99214 OFFICE O/P EST MOD 30 MIN: CPT | Performed by: STUDENT IN AN ORGANIZED HEALTH CARE EDUCATION/TRAINING PROGRAM

## 2023-05-30 ENCOUNTER — PROCEDURE VISIT (OUTPATIENT)
Dept: FAMILY MEDICINE CLINIC | Age: 53
End: 2023-05-30
Payer: COMMERCIAL

## 2023-05-30 VITALS
SYSTOLIC BLOOD PRESSURE: 138 MMHG | TEMPERATURE: 97.3 F | HEIGHT: 65 IN | BODY MASS INDEX: 24.49 KG/M2 | DIASTOLIC BLOOD PRESSURE: 81 MMHG | WEIGHT: 147 LBS | HEART RATE: 66 BPM | OXYGEN SATURATION: 97 %

## 2023-05-30 DIAGNOSIS — M17.11 OSTEOARTHRITIS OF RIGHT KNEE, UNSPECIFIED OSTEOARTHRITIS TYPE: Primary | ICD-10-CM

## 2023-05-30 PROCEDURE — 20610 DRAIN/INJ JOINT/BURSA W/O US: CPT | Performed by: STUDENT IN AN ORGANIZED HEALTH CARE EDUCATION/TRAINING PROGRAM

## 2023-05-30 RX ORDER — TRIAMCINOLONE ACETONIDE 40 MG/ML
40 INJECTION, SUSPENSION INTRA-ARTICULAR; INTRAMUSCULAR ONCE
Status: COMPLETED | OUTPATIENT
Start: 2023-05-30 | End: 2023-05-30

## 2023-05-30 RX ADMIN — TRIAMCINOLONE ACETONIDE 40 MG: 40 INJECTION, SUSPENSION INTRA-ARTICULAR; INTRAMUSCULAR at 16:01

## 2023-05-30 ASSESSMENT — ENCOUNTER SYMPTOMS
DIARRHEA: 0
NAUSEA: 0
RHINORRHEA: 0
VOMITING: 0
COUGH: 0
ABDOMINAL PAIN: 0
EYE DISCHARGE: 0
SORE THROAT: 0
SHORTNESS OF BREATH: 0
WHEEZING: 0

## 2023-05-30 NOTE — PROGRESS NOTES
Joint Injection Procedure Note    Indication: right knee osteoarthritis   After having explained the potential risks and benefits of the right knee injection, the patient gave verbal consent to proceed. The patient's identifiers were confirmed with a timeout, including name and date of birth (and that no heat source or antibiotics were utilized with this procedure). The site was then confirmed and marked, and the area was prepped in aseptic fashion with Betadine. A 25 gauge, 1.5 inch needle was directed into the above area. The injection was completed with 40 mg of Kenalog mixed with 3 cc of 1% Lidocaine. The patient tolerated the procedure without difficulty and was instructed on post-injection care. All questions answered. Follow up as scheduled.     Arin Renteria DO

## 2023-08-08 ENCOUNTER — OFFICE VISIT (OUTPATIENT)
Dept: FAMILY MEDICINE CLINIC | Age: 53
End: 2023-08-08
Payer: COMMERCIAL

## 2023-08-08 VITALS
TEMPERATURE: 98.4 F | OXYGEN SATURATION: 97 % | HEIGHT: 65 IN | BODY MASS INDEX: 24.49 KG/M2 | HEART RATE: 66 BPM | DIASTOLIC BLOOD PRESSURE: 80 MMHG | WEIGHT: 147 LBS | SYSTOLIC BLOOD PRESSURE: 128 MMHG

## 2023-08-08 DIAGNOSIS — Z01.419 WELL WOMAN EXAM WITH ROUTINE GYNECOLOGICAL EXAM: ICD-10-CM

## 2023-08-08 DIAGNOSIS — R10.13 DYSPEPSIA: ICD-10-CM

## 2023-08-08 DIAGNOSIS — Z01.419 WELL WOMAN EXAM WITH ROUTINE GYNECOLOGICAL EXAM: Primary | ICD-10-CM

## 2023-08-08 DIAGNOSIS — E03.9 HYPOTHYROIDISM, UNSPECIFIED TYPE: ICD-10-CM

## 2023-08-08 PROCEDURE — 99396 PREV VISIT EST AGE 40-64: CPT | Performed by: STUDENT IN AN ORGANIZED HEALTH CARE EDUCATION/TRAINING PROGRAM

## 2023-08-08 RX ORDER — OMEPRAZOLE 20 MG/1
20 CAPSULE, DELAYED RELEASE ORAL DAILY
COMMUNITY
End: 2023-08-08 | Stop reason: SDUPTHER

## 2023-08-08 RX ORDER — LEVOTHYROXINE SODIUM 0.1 MG/1
100 TABLET ORAL DAILY
Qty: 90 TABLET | Refills: 1 | Status: SHIPPED | OUTPATIENT
Start: 2023-08-08

## 2023-08-08 RX ORDER — OMEPRAZOLE 20 MG/1
20 CAPSULE, DELAYED RELEASE ORAL DAILY
Qty: 90 CAPSULE | Refills: 1 | Status: SHIPPED | OUTPATIENT
Start: 2023-08-08 | End: 2023-08-08 | Stop reason: DRUGHIGH

## 2023-08-08 RX ORDER — OMEPRAZOLE 40 MG/1
40 CAPSULE, DELAYED RELEASE ORAL
Qty: 90 CAPSULE | Refills: 1 | Status: SHIPPED | OUTPATIENT
Start: 2023-08-08

## 2023-08-08 ASSESSMENT — ENCOUNTER SYMPTOMS
SHORTNESS OF BREATH: 0
ABDOMINAL DISTENTION: 0
DIARRHEA: 0
COUGH: 0
BACK PAIN: 0
VOMITING: 0
NAUSEA: 0
WHEEZING: 0
ABDOMINAL PAIN: 0

## 2023-08-08 NOTE — PROGRESS NOTES
Postmenopausal Annual    Subjective:     Chief Complaint   Patient presents with    Gynecologic Exam     Denies any vaginal discharge, odor, pain or itching. Denies concerns for STDs. States urinary frequency, urgency & incont are normal for her. Edward Croft is a 52y.o. year old female    female who presents today for her annual well woman exam.  The patient is sexually active. The patient has never been taking hormone replacement therapy. Patient denies post-menopausal vaginal bleeding. The patient has regular exercise: yes    Past Medical History:   Diagnosis Date    Abnormal Pap smear of cervix     Allergic rhinitis     Cerebrovascular disease     Chronic back pain 2019    Depression     Depression with anxiety     Depression with anxiety     Fibromyalgia ? GERD (gastroesophageal reflux disease)     Headache     Hyperthyroidism ?     Hypothyroidism     Not sure of exact date    Iron deficiency anemia 2019    Irritable bowel syndrome     Osteoarthritis 2022    Stroke ([address]) 2016    Idiopathic she is still in process     Thyroid disease      Past Surgical History:   Procedure Laterality Date    BLADDER SURGERY      done by Dr. Sudarshan Roman x10 yrs    COLONOSCOPY  2019    KERRY AMADO MD    DILATION AND CURETTAGE      ENDOMETRIAL ABLATION      TONSILLECTOMY       Family History   Problem Relation Age of Onset    Breast Cancer Neg Hx      Social History     Socioeconomic History    Marital status:      Spouse name: Not on file    Number of children: Not on file    Years of education: Not on file    Highest education level: Not on file   Occupational History    Not on file   Tobacco Use    Smoking status: Former     Packs/day: 0.25     Years: 3.00     Pack years: 0.75     Types: Cigarettes     Start date: 1988     Quit date: 1990     Years since quittin.1    Smokeless tobacco: Never   Vaping Use    Vaping Use: Never used   Substance and

## 2023-08-14 ENCOUNTER — HOSPITAL ENCOUNTER (OUTPATIENT)
Dept: GENERAL RADIOLOGY | Age: 53
Discharge: HOME | End: 2023-08-16
Payer: COMMERCIAL

## 2023-08-14 DIAGNOSIS — K22.5 ZENKER DIVERTICULUM: Primary | ICD-10-CM

## 2023-08-14 DIAGNOSIS — R13.10 DYSPHAGIA, UNSPECIFIED TYPE: ICD-10-CM

## 2023-08-14 PROCEDURE — 74220 X-RAY XM ESOPHAGUS 1CNTRST: CPT

## 2023-08-14 PROCEDURE — 2500000003 HC RX 250 WO HCPCS: Performed by: STUDENT IN AN ORGANIZED HEALTH CARE EDUCATION/TRAINING PROGRAM

## 2023-08-14 RX ADMIN — BARIUM SULFATE 340 ML: 980 POWDER, FOR SUSPENSION ORAL at 09:26

## 2023-08-14 RX ADMIN — BARIUM SULFATE 176 ML: 960 POWDER, FOR SUSPENSION ORAL at 09:24

## 2023-08-15 LAB
HPV HR 12 DNA SPEC QL NAA+PROBE: DETECTED
HPV16 DNA SPEC QL NAA+PROBE: NOT DETECTED
HPV16+18+H RISK 12 DNA SPEC-IMP: ABNORMAL
HPV18 DNA SPEC QL NAA+PROBE: NOT DETECTED

## 2023-08-18 NOTE — RESULT ENCOUNTER NOTE
Please let patient know that the cells of her Pap smear were normal.  She did test positive for other types of HPV, not the ones that typically cause cervical cancer. Recommendation is to repeat her Pap test in 1 year.   Thanks

## 2023-08-30 ENCOUNTER — OFFICE VISIT (OUTPATIENT)
Dept: CARDIOTHORACIC SURGERY | Age: 53
End: 2023-08-30
Payer: COMMERCIAL

## 2023-08-30 VITALS — BODY MASS INDEX: 24.32 KG/M2 | WEIGHT: 146 LBS | OXYGEN SATURATION: 99 % | HEIGHT: 65 IN | HEART RATE: 64 BPM

## 2023-08-30 DIAGNOSIS — K22.5 ZENKER DIVERTICULA: Primary | ICD-10-CM

## 2023-08-30 DIAGNOSIS — K22.5 ZENKER'S DIVERTICULUM: Primary | ICD-10-CM

## 2023-08-30 PROCEDURE — 99203 OFFICE O/P NEW LOW 30 MIN: CPT | Performed by: THORACIC SURGERY (CARDIOTHORACIC VASCULAR SURGERY)

## 2023-08-30 ASSESSMENT — ENCOUNTER SYMPTOMS
ABDOMINAL PAIN: 0
SHORTNESS OF BREATH: 0
STRIDOR: 0
TROUBLE SWALLOWING: 1
ABDOMINAL DISTENTION: 0
CHOKING: 0
CHEST TIGHTNESS: 0
DIARRHEA: 0
VOMITING: 0
WHEEZING: 0
SORE THROAT: 0
NAUSEA: 0
VOICE CHANGE: 0
COUGH: 0

## 2023-08-30 NOTE — PATIENT INSTRUCTIONS
Patient agrees to see Dr. Parth Goldman to see if an endoscopic repair of her diverticulum is appropriate. She will let me know which route she agrees to proceed with.

## 2023-08-30 NOTE — PROGRESS NOTES
Subjective:      Patient ID: Mabel Bush is a 52 y.o. female who presents today for:  Chief Complaint   Patient presents with    New Patient       HPI  Patient was in her usual state of health until about 2 months ago when she started noticing an odd feeling with swallowing. Sometimes she would feel a prolonged pressure in her throat after eating. Sometimes she would feel a gurgling sensation and sometimes she would have trouble getting the food down. She reports the symptoms are slowly getting worse. As a part of her work-up her primary doctor ordered an esophagram which did show a 1.2 cm Zenker's diverticulum. Patient is otherwise in good health. Both of the patient's daughters are expecting mothers and are due to give birth in the very near future. Patient was hoping to get repaired as soon as possible. Past Medical History:   Diagnosis Date    Abnormal Pap smear of cervix     Allergic rhinitis     Cerebrovascular disease 2016    Chronic back pain 9/22/2019    Depression     Depression with anxiety     Depression with anxiety     Fibromyalgia 2019? GERD (gastroesophageal reflux disease)     Headache 1987    Hyperthyroidism 1995?     Hypothyroidism 1994    Not sure of exact date    Iron deficiency anemia 04/12/2019    Irritable bowel syndrome     Osteoarthritis 1/2022    Stroke ([address]) 05/2016    Idiopathic she is still in process     Thyroid disease       Past Surgical History:   Procedure Laterality Date    BLADDER SURGERY      done by Dr. THOMAS [facility] x10 yrs    COLONOSCOPY  03/04/2019    KERRY AMADO MD    DILATION AND CURETTAGE      ENDOMETRIAL ABLATION      TONSILLECTOMY       Social History     Socioeconomic History    Marital status:      Spouse name: Not on file    Number of children: Not on file    Years of education: Not on file    Highest education level: Not on file   Occupational History    Not on file   Tobacco Use    Smoking status: Former     Packs/day: 0.25     Years: 3.00     Pack

## 2023-09-01 ENCOUNTER — PREP FOR PROCEDURE (OUTPATIENT)
Dept: CARDIOTHORACIC SURGERY | Age: 53
End: 2023-09-01

## 2023-09-01 PROBLEM — K22.5 ZENKER'S DIVERTICULUM: Status: ACTIVE | Noted: 2023-09-01

## 2023-09-05 RX ORDER — SODIUM CHLORIDE 9 MG/ML
INJECTION, SOLUTION INTRAVENOUS PRN
Status: CANCELLED | OUTPATIENT
Start: 2023-09-07

## 2023-09-05 RX ORDER — SODIUM CHLORIDE 0.9 % (FLUSH) 0.9 %
5-40 SYRINGE (ML) INJECTION EVERY 12 HOURS SCHEDULED
Status: CANCELLED | OUTPATIENT
Start: 2023-09-07

## 2023-09-05 RX ORDER — SODIUM CHLORIDE 0.9 % (FLUSH) 0.9 %
5-40 SYRINGE (ML) INJECTION PRN
Status: CANCELLED | OUTPATIENT
Start: 2023-09-07

## 2023-09-07 ENCOUNTER — ANESTHESIA (OUTPATIENT)
Dept: OPERATING ROOM | Age: 53
End: 2023-09-07
Payer: COMMERCIAL

## 2023-09-07 ENCOUNTER — HOSPITAL ENCOUNTER (INPATIENT)
Age: 53
LOS: 2 days | Discharge: HOME | End: 2023-09-09
Attending: THORACIC SURGERY (CARDIOTHORACIC VASCULAR SURGERY) | Admitting: THORACIC SURGERY (CARDIOTHORACIC VASCULAR SURGERY)
Payer: COMMERCIAL

## 2023-09-07 ENCOUNTER — ANESTHESIA EVENT (OUTPATIENT)
Dept: OPERATING ROOM | Age: 53
End: 2023-09-07
Payer: COMMERCIAL

## 2023-09-07 DIAGNOSIS — K22.5 ZENKER'S DIVERTICULUM: ICD-10-CM

## 2023-09-07 DIAGNOSIS — Q39.6 ESOPHAGEAL DIVERTICULUM: Primary | ICD-10-CM

## 2023-09-07 PROCEDURE — 2500000003 HC RX 250 WO HCPCS: Performed by: THORACIC SURGERY (CARDIOTHORACIC VASCULAR SURGERY)

## 2023-09-07 PROCEDURE — 6360000002 HC RX W HCPCS: Performed by: THORACIC SURGERY (CARDIOTHORACIC VASCULAR SURGERY)

## 2023-09-07 PROCEDURE — A4216 STERILE WATER/SALINE, 10 ML: HCPCS | Performed by: THORACIC SURGERY (CARDIOTHORACIC VASCULAR SURGERY)

## 2023-09-07 PROCEDURE — 3700000000 HC ANESTHESIA ATTENDED CARE: Performed by: THORACIC SURGERY (CARDIOTHORACIC VASCULAR SURGERY)

## 2023-09-07 PROCEDURE — 2709999900 HC NON-CHARGEABLE SUPPLY: Performed by: THORACIC SURGERY (CARDIOTHORACIC VASCULAR SURGERY)

## 2023-09-07 PROCEDURE — 3700000001 HC ADD 15 MINUTES (ANESTHESIA): Performed by: THORACIC SURGERY (CARDIOTHORACIC VASCULAR SURGERY)

## 2023-09-07 PROCEDURE — 7100000000 HC PACU RECOVERY - FIRST 15 MIN: Performed by: THORACIC SURGERY (CARDIOTHORACIC VASCULAR SURGERY)

## 2023-09-07 PROCEDURE — 7100000001 HC PACU RECOVERY - ADDTL 15 MIN: Performed by: THORACIC SURGERY (CARDIOTHORACIC VASCULAR SURGERY)

## 2023-09-07 PROCEDURE — 2580000003 HC RX 258: Performed by: THORACIC SURGERY (CARDIOTHORACIC VASCULAR SURGERY)

## 2023-09-07 PROCEDURE — 2500000003 HC RX 250 WO HCPCS: Performed by: REGISTERED NURSE

## 2023-09-07 PROCEDURE — A4217 STERILE WATER/SALINE, 500 ML: HCPCS | Performed by: THORACIC SURGERY (CARDIOTHORACIC VASCULAR SURGERY)

## 2023-09-07 PROCEDURE — 1210000000 HC MED SURG R&B

## 2023-09-07 PROCEDURE — 6360000002 HC RX W HCPCS: Performed by: REGISTERED NURSE

## 2023-09-07 PROCEDURE — C9399 UNCLASSIFIED DRUGS OR BIOLOG: HCPCS | Performed by: REGISTERED NURSE

## 2023-09-07 PROCEDURE — 0DB58ZZ EXCISION OF ESOPHAGUS, VIA NATURAL OR ARTIFICIAL OPENING ENDOSCOPIC: ICD-10-PCS | Performed by: THORACIC SURGERY (CARDIOTHORACIC VASCULAR SURGERY)

## 2023-09-07 PROCEDURE — 0K840ZZ DIVISION OF TONGUE, PALATE, PHARYNX MUSCLE, OPEN APPROACH: ICD-10-PCS | Performed by: THORACIC SURGERY (CARDIOTHORACIC VASCULAR SURGERY)

## 2023-09-07 PROCEDURE — 3600000014 HC SURGERY LEVEL 4 ADDTL 15MIN: Performed by: THORACIC SURGERY (CARDIOTHORACIC VASCULAR SURGERY)

## 2023-09-07 PROCEDURE — 3600000004 HC SURGERY LEVEL 4 BASE: Performed by: THORACIC SURGERY (CARDIOTHORACIC VASCULAR SURGERY)

## 2023-09-07 PROCEDURE — 43130 REMOVAL OF ESOPHAGUS POUCH: CPT | Performed by: THORACIC SURGERY (CARDIOTHORACIC VASCULAR SURGERY)

## 2023-09-07 PROCEDURE — 0DJ08ZZ INSPECTION OF UPPER INTESTINAL TRACT, VIA NATURAL OR ARTIFICIAL OPENING ENDOSCOPIC: ICD-10-PCS | Performed by: THORACIC SURGERY (CARDIOTHORACIC VASCULAR SURGERY)

## 2023-09-07 RX ORDER — ROCURONIUM BROMIDE 10 MG/ML
INJECTION, SOLUTION INTRAVENOUS PRN
Status: DISCONTINUED | OUTPATIENT
Start: 2023-09-07 | End: 2023-09-07 | Stop reason: SDUPTHER

## 2023-09-07 RX ORDER — SODIUM CHLORIDE 0.9 % (FLUSH) 0.9 %
5-40 SYRINGE (ML) INJECTION EVERY 12 HOURS SCHEDULED
Status: DISCONTINUED | OUTPATIENT
Start: 2023-09-07 | End: 2023-09-07 | Stop reason: HOSPADM

## 2023-09-07 RX ORDER — FENTANYL CITRATE 0.05 MG/ML
50 INJECTION, SOLUTION INTRAMUSCULAR; INTRAVENOUS EVERY 10 MIN PRN
Status: DISCONTINUED | OUTPATIENT
Start: 2023-09-07 | End: 2023-09-07 | Stop reason: HOSPADM

## 2023-09-07 RX ORDER — SODIUM CHLORIDE 9 MG/ML
INJECTION, SOLUTION INTRAVENOUS PRN
Status: DISCONTINUED | OUTPATIENT
Start: 2023-09-07 | End: 2023-09-07 | Stop reason: HOSPADM

## 2023-09-07 RX ORDER — METOCLOPRAMIDE HYDROCHLORIDE 5 MG/ML
10 INJECTION INTRAMUSCULAR; INTRAVENOUS
Status: DISCONTINUED | OUTPATIENT
Start: 2023-09-07 | End: 2023-09-07 | Stop reason: HOSPADM

## 2023-09-07 RX ORDER — ONDANSETRON 2 MG/ML
4 INJECTION INTRAMUSCULAR; INTRAVENOUS
Status: DISCONTINUED | OUTPATIENT
Start: 2023-09-07 | End: 2023-09-07 | Stop reason: HOSPADM

## 2023-09-07 RX ORDER — FENTANYL CITRATE 50 UG/ML
INJECTION, SOLUTION INTRAMUSCULAR; INTRAVENOUS PRN
Status: DISCONTINUED | OUTPATIENT
Start: 2023-09-07 | End: 2023-09-07 | Stop reason: SDUPTHER

## 2023-09-07 RX ORDER — SODIUM CHLORIDE 0.9 % (FLUSH) 0.9 %
5-40 SYRINGE (ML) INJECTION PRN
Status: DISCONTINUED | OUTPATIENT
Start: 2023-09-07 | End: 2023-09-09 | Stop reason: HOSPADM

## 2023-09-07 RX ORDER — DIPHENHYDRAMINE HYDROCHLORIDE 50 MG/ML
12.5 INJECTION INTRAMUSCULAR; INTRAVENOUS
Status: DISCONTINUED | OUTPATIENT
Start: 2023-09-07 | End: 2023-09-07 | Stop reason: HOSPADM

## 2023-09-07 RX ORDER — SODIUM CHLORIDE 9 MG/ML
INJECTION, SOLUTION INTRAVENOUS PRN
Status: DISCONTINUED | OUTPATIENT
Start: 2023-09-07 | End: 2023-09-09 | Stop reason: HOSPADM

## 2023-09-07 RX ORDER — SUCCINYLCHOLINE/SOD CL,ISO/PF 100 MG/5ML
SYRINGE (ML) INTRAVENOUS PRN
Status: DISCONTINUED | OUTPATIENT
Start: 2023-09-07 | End: 2023-09-07 | Stop reason: SDUPTHER

## 2023-09-07 RX ORDER — MIDAZOLAM HYDROCHLORIDE 1 MG/ML
INJECTION INTRAMUSCULAR; INTRAVENOUS PRN
Status: DISCONTINUED | OUTPATIENT
Start: 2023-09-07 | End: 2023-09-07 | Stop reason: SDUPTHER

## 2023-09-07 RX ORDER — OXYCODONE HYDROCHLORIDE 5 MG/1
5 TABLET ORAL
Status: DISCONTINUED | OUTPATIENT
Start: 2023-09-07 | End: 2023-09-07 | Stop reason: HOSPADM

## 2023-09-07 RX ORDER — LIDOCAINE HYDROCHLORIDE 10 MG/ML
1 INJECTION, SOLUTION EPIDURAL; INFILTRATION; INTRACAUDAL; PERINEURAL
Status: DISCONTINUED | OUTPATIENT
Start: 2023-09-07 | End: 2023-09-07 | Stop reason: HOSPADM

## 2023-09-07 RX ORDER — SODIUM CHLORIDE 0.9 % (FLUSH) 0.9 %
5-40 SYRINGE (ML) INJECTION PRN
Status: DISCONTINUED | OUTPATIENT
Start: 2023-09-07 | End: 2023-09-07 | Stop reason: HOSPADM

## 2023-09-07 RX ORDER — MAGNESIUM SULFATE IN WATER 40 MG/ML
2000 INJECTION, SOLUTION INTRAVENOUS PRN
Status: DISCONTINUED | OUTPATIENT
Start: 2023-09-07 | End: 2023-09-09 | Stop reason: HOSPADM

## 2023-09-07 RX ORDER — ONDANSETRON 2 MG/ML
INJECTION INTRAMUSCULAR; INTRAVENOUS PRN
Status: DISCONTINUED | OUTPATIENT
Start: 2023-09-07 | End: 2023-09-07 | Stop reason: SDUPTHER

## 2023-09-07 RX ORDER — ENOXAPARIN SODIUM 100 MG/ML
40 INJECTION SUBCUTANEOUS EVERY 24 HOURS
Status: DISCONTINUED | OUTPATIENT
Start: 2023-09-07 | End: 2023-09-09 | Stop reason: HOSPADM

## 2023-09-07 RX ORDER — FAMOTIDINE 20 MG/1
20 TABLET, FILM COATED ORAL 2 TIMES DAILY
Status: DISCONTINUED | OUTPATIENT
Start: 2023-09-07 | End: 2023-09-09 | Stop reason: HOSPADM

## 2023-09-07 RX ORDER — MEPERIDINE HYDROCHLORIDE 25 MG/ML
12.5 INJECTION INTRAMUSCULAR; INTRAVENOUS; SUBCUTANEOUS
Status: DISCONTINUED | OUTPATIENT
Start: 2023-09-07 | End: 2023-09-07 | Stop reason: HOSPADM

## 2023-09-07 RX ORDER — ONDANSETRON 4 MG/1
4 TABLET, ORALLY DISINTEGRATING ORAL EVERY 8 HOURS PRN
Status: DISCONTINUED | OUTPATIENT
Start: 2023-09-07 | End: 2023-09-09 | Stop reason: HOSPADM

## 2023-09-07 RX ORDER — DEXTROSE, SODIUM CHLORIDE, SODIUM LACTATE, POTASSIUM CHLORIDE, AND CALCIUM CHLORIDE 5; .6; .31; .03; .02 G/100ML; G/100ML; G/100ML; G/100ML; G/100ML
INJECTION, SOLUTION INTRAVENOUS CONTINUOUS
Status: DISCONTINUED | OUTPATIENT
Start: 2023-09-07 | End: 2023-09-08

## 2023-09-07 RX ORDER — PROPOFOL 10 MG/ML
INJECTION, EMULSION INTRAVENOUS PRN
Status: DISCONTINUED | OUTPATIENT
Start: 2023-09-07 | End: 2023-09-07 | Stop reason: SDUPTHER

## 2023-09-07 RX ORDER — BUPIVACAINE HYDROCHLORIDE 5 MG/ML
INJECTION, SOLUTION EPIDURAL; INTRACAUDAL PRN
Status: DISCONTINUED | OUTPATIENT
Start: 2023-09-07 | End: 2023-09-07 | Stop reason: HOSPADM

## 2023-09-07 RX ORDER — POTASSIUM CHLORIDE 7.45 MG/ML
10 INJECTION INTRAVENOUS PRN
Status: DISCONTINUED | OUTPATIENT
Start: 2023-09-07 | End: 2023-09-09 | Stop reason: HOSPADM

## 2023-09-07 RX ORDER — SODIUM CHLORIDE 0.9 % (FLUSH) 0.9 %
5-40 SYRINGE (ML) INJECTION EVERY 12 HOURS SCHEDULED
Status: DISCONTINUED | OUTPATIENT
Start: 2023-09-07 | End: 2023-09-09 | Stop reason: HOSPADM

## 2023-09-07 RX ORDER — BISACODYL 10 MG
10 SUPPOSITORY, RECTAL RECTAL DAILY PRN
Status: DISCONTINUED | OUTPATIENT
Start: 2023-09-07 | End: 2023-09-09 | Stop reason: HOSPADM

## 2023-09-07 RX ORDER — ONDANSETRON 2 MG/ML
4 INJECTION INTRAMUSCULAR; INTRAVENOUS EVERY 6 HOURS PRN
Status: DISCONTINUED | OUTPATIENT
Start: 2023-09-07 | End: 2023-09-09 | Stop reason: HOSPADM

## 2023-09-07 RX ADMIN — FAMOTIDINE 20 MG: 10 INJECTION, SOLUTION INTRAVENOUS at 20:17

## 2023-09-07 RX ADMIN — Medication 100 MG: at 14:14

## 2023-09-07 RX ADMIN — MIDAZOLAM HYDROCHLORIDE 2 MG: 1 INJECTION, SOLUTION INTRAMUSCULAR; INTRAVENOUS at 14:09

## 2023-09-07 RX ADMIN — SUGAMMADEX 200 MG: 100 INJECTION, SOLUTION INTRAVENOUS at 15:30

## 2023-09-07 RX ADMIN — SODIUM CHLORIDE: 9 INJECTION, SOLUTION INTRAVENOUS at 13:04

## 2023-09-07 RX ADMIN — Medication 10 ML: at 20:20

## 2023-09-07 RX ADMIN — ENOXAPARIN SODIUM 40 MG: 100 INJECTION SUBCUTANEOUS at 17:33

## 2023-09-07 RX ADMIN — ONDANSETRON 4 MG: 2 INJECTION INTRAMUSCULAR; INTRAVENOUS at 17:33

## 2023-09-07 RX ADMIN — FENTANYL CITRATE 100 MCG: 50 INJECTION, SOLUTION INTRAMUSCULAR; INTRAVENOUS at 14:15

## 2023-09-07 RX ADMIN — CEFAZOLIN 2000 MG: 2 INJECTION, POWDER, FOR SOLUTION INTRAMUSCULAR; INTRAVENOUS at 14:15

## 2023-09-07 RX ADMIN — HYDROMORPHONE HYDROCHLORIDE 0.25 MG: 1 INJECTION, SOLUTION INTRAMUSCULAR; INTRAVENOUS; SUBCUTANEOUS at 18:42

## 2023-09-07 RX ADMIN — ONDANSETRON 4 MG: 2 INJECTION INTRAMUSCULAR; INTRAVENOUS at 14:34

## 2023-09-07 RX ADMIN — PROPOFOL 200 MG: 10 INJECTION, EMULSION INTRAVENOUS at 14:14

## 2023-09-07 RX ADMIN — FENTANYL CITRATE 50 MCG: 50 INJECTION, SOLUTION INTRAMUSCULAR; INTRAVENOUS at 15:30

## 2023-09-07 RX ADMIN — ROCURONIUM BROMIDE 50 MG: 10 INJECTION, SOLUTION INTRAVENOUS at 14:17

## 2023-09-07 RX ADMIN — SODIUM CHLORIDE, SODIUM LACTATE, POTASSIUM CHLORIDE, CALCIUM CHLORIDE AND DEXTROSE MONOHYDRATE: 5; 600; 310; 30; 20 INJECTION, SOLUTION INTRAVENOUS at 17:13

## 2023-09-07 RX ADMIN — FENTANYL CITRATE 50 MCG: 50 INJECTION, SOLUTION INTRAMUSCULAR; INTRAVENOUS at 14:54

## 2023-09-07 ASSESSMENT — PAIN DESCRIPTION - FREQUENCY
FREQUENCY: CONTINUOUS
FREQUENCY: CONTINUOUS

## 2023-09-07 ASSESSMENT — PAIN SCALES - GENERAL
PAINLEVEL_OUTOF10: 5
PAINLEVEL_OUTOF10: 7
PAINLEVEL_OUTOF10: 4
PAINLEVEL_OUTOF10: 5
PAINLEVEL_OUTOF10: 7
PAINLEVEL_OUTOF10: 4

## 2023-09-07 ASSESSMENT — PAIN DESCRIPTION - PAIN TYPE
TYPE: SURGICAL PAIN

## 2023-09-07 ASSESSMENT — PAIN DESCRIPTION - DESCRIPTORS
DESCRIPTORS: SORE
DESCRIPTORS: THROBBING;SORE
DESCRIPTORS: THROBBING
DESCRIPTORS: SORE
DESCRIPTORS: THROBBING

## 2023-09-07 ASSESSMENT — PAIN DESCRIPTION - LOCATION
LOCATION: THROAT
LOCATION: HEAD
LOCATION: THROAT

## 2023-09-07 ASSESSMENT — PAIN DESCRIPTION - ORIENTATION
ORIENTATION: INNER

## 2023-09-07 ASSESSMENT — PAIN DESCRIPTION - ONSET
ONSET: ON-GOING

## 2023-09-07 ASSESSMENT — PAIN - FUNCTIONAL ASSESSMENT
PAIN_FUNCTIONAL_ASSESSMENT: PREVENTS OR INTERFERES SOME ACTIVE ACTIVITIES AND ADLS
PAIN_FUNCTIONAL_ASSESSMENT: PREVENTS OR INTERFERES SOME ACTIVE ACTIVITIES AND ADLS

## 2023-09-07 NOTE — OP NOTE
Operative Note      Patient: Robby Bloch  YOB: 1970  MRN: [id number]    Date of Procedure: 9/7/2023    Pre-Op Diagnosis Codes:     * Zenker's diverticulum [K22.5]    Post-Op Diagnosis: Same       Procedure(s):  EGD, excision of cervical esophageal diverticulum, cricopharyngeal myotomy    Surgeon(s):  Yvette Cody MD    Assistant:   First Assistant: Romana Arrant    Anesthesia: General    Estimated Blood Loss (mL): Minimal    Complications: None    Specimens:   * No specimens in log *    Implants:  * No implants in log *      Drains: * No LDAs found *    Findings: Diverticulum        Detailed Description of Procedure:   Patient had progressive feeling of fullness in her throat with swallowing. Her work-up included an esophagram which showed a cervical esophageal diverticulum consistent with a Zenker's diverticulum. Due to the worsening symptoms treatment options were discussed and she agreed with surgical excision with myotomy. Once patient was intubated and adult gastroscope was gently guided through her esophagus into the stomach. Upon slow withdrawal there is no evidence of duodenitis or tumors. We were unable to see the diverticulum with the gastroscope. The gastroscope was then gently guided down in the stomach. Patient's neck was then prepped and draped in a sterile fashion. An incision was made just anterior to the left sternocleidomastoid muscle. Using cautery the platysma was transected. The sternocleidomastoid muscle jugular vein and carotid artery were gently reflected laterally so that we could enter into the prevertebral space. The omohyoid muscle was transected. Once we entered the prevertebral space we carefully dissected out the proximal cervical esophagus. We then gently continued our blunt dissection proximally esophagus until we ran into the cricopharyngeal muscle. We then carefully freed this area off of the vertebral bodies.   At this point we could see the diverticulum. Using careful dissection we began opening up the longitudinal esophageal musculature about 2 to 3 cm inferior to the cricopharyngeal muscle. We then gently elevated this muscle off of the underlying mucosa and used cautery to perform the myotomy. We were able to completely transect all of the cricopharyngeal muscle fibers as well freeing up any distal resistance. Using careful blunt dissection we freed up the diverticulum from all the surrounding attachments. It was probably less than a centimeter but was broad-based. Using multiple interrupted 4-0 Prolene sutures we were able to gently imbricate the broad-based diverticulum on itself. Once all the sutures were tied down and the bulging diverticulum was completely obliterated. The surgical site was irrigated with antibiotic solution. The platysma muscle and skin were then closed in layers.     Electronically signed by Kathrine Stock MD on 9/7/2023 at 3:46 PM

## 2023-09-07 NOTE — PLAN OF CARE
Patient progressing towards discharge    Problem: Discharge Planning  Goal: Discharge to home or other facility with appropriate resources  Outcome: Progressing  Flowsheets (Taken 9/7/2023 2354)  Discharge to home or other facility with appropriate resources: Identify barriers to discharge with patient and caregiver     Problem: Chronic Conditions and Co-morbidities  Goal: Patient's chronic conditions and co-morbidity symptoms are monitored and maintained or improved  Outcome: Progressing     Problem: Pain  Goal: Verbalizes/displays adequate comfort level or baseline comfort level  Outcome: Progressing

## 2023-09-07 NOTE — ANESTHESIA POSTPROCEDURE EVALUATION
Department of Anesthesiology  Postprocedure Note    Patient: Saira Heart  MRN: [id number]  YOB: 1970  Date of evaluation: 9/7/2023      Procedure Summary     Date: 09/07/23 Room / Location: [address]    Anesthesia Start: 1405 Anesthesia Stop:     Procedure: EGD, excision of cervical esophageal diverticulum (Neck) Diagnosis:       Zenker's diverticulum      (Zenker's diverticulum [K22.5])    Surgeons: Johny Mccurdy MD Responsible Provider: Lg Stoner MD    Anesthesia Type: general ASA Status: 3          Anesthesia Type: No value filed.     Christophe Phase I:      Christophe Phase II:        Anesthesia Post Evaluation    Patient location during evaluation: bedside  Patient participation: complete - patient participated  Level of consciousness: awake and awake and alert  Airway patency: patent  Nausea & Vomiting: no nausea and no vomiting  Complications: no  Cardiovascular status: blood pressure returned to baseline and hemodynamically stable  Respiratory status: acceptable  Hydration status: euvolemic  Pain management: adequate

## 2023-09-07 NOTE — ANESTHESIA PRE PROCEDURE
Department of Anesthesiology  Preprocedure Note       Name:  Benedict Pitts   Age:  52 y.o.  :  1970                                          MRN:  [id number]         Date:  2023      Surgeon: Aba Raymond):  Bharath Jacobs MD    Procedure: Procedure(s):  EGD, excision of cervical esophageal diverticulum Medications prior to admission:   Prior to Admission medications    Medication Sig Start Date End Date Taking? Authorizing Provider   levothyroxine (SYNTHROID) 100 MCG tablet Take 1 tablet by mouth daily 23   Tasha Lainez DO   omeprazole (PRILOSEC) 40 MG delayed release capsule Take 1 capsule by mouth every morning (before breakfast) 23   Tasha Lainez, DO   Handicap Placard MISC by Does not apply route For medical condition lasting longer than 5 years.   Start 2023  End 2028   Tasha Lainez,    nitrofurantoin (MACRODANTIN) 50 MG capsule Oral: 50 mg as a single dose taken within 2 hours of sexual intercourse  Patient not taking: Reported on 2023 4/3/23   Tasha Lainez DO   Multiple Vitamin (MULTIVITAMIN ADULT PO) Take by mouth    Historical Provider, MD   atorvastatin (LIPITOR) 40 MG tablet Take 1 tablet by mouth daily  Patient taking differently: Take 0.5 tablets by mouth daily 10/27/22 9/7/23  Tasha Lainez DO   acetaminophen (TYLENOL) 500 MG tablet Take 1 tablet by mouth every 6 hours as needed for Pain    Historical Provider, MD   lidocaine (LMX) 4 % cream Apply a half dollar sized amount to intact skin topically up to twice daily as needed for pain  Patient not taking: Reported on 2023   Mami Chris MD   fluticasone (FLONASE) 50 MCG/ACT nasal spray 1 spray by Each Nostril route daily as needed  Patient not taking: Reported on 2023    Historical Provider, MD   aspirin 81 MG tablet Take 1 tablet by mouth Takes 2 tablets daily    Historical Provider, MD       Current medications:    Current Facility-Administered Medications   Medication Dose

## 2023-09-07 NOTE — FLOWSHEET NOTE
1801: Patient admitted from OR , S/P  Zenkers Diverticulum, surgical incision to left neck, dressing intact, patient to remain NPO, Nausea/ Vomiting present. Patient emesis , clear phlegm . IV Zofran given. Patient alert and oriented times 4, able to make all needs known, ambulated patient to bathroom, voided without difficulty. C/O pain to back of throat. Medicated per PRN orders. Oriented to room, home medications reconciled. Call light in reach. .Electronically signed by Bernice Castro RN on 9/7/2023 at 6:12 PM

## 2023-09-08 ENCOUNTER — APPOINTMENT (OUTPATIENT)
Dept: GENERAL RADIOLOGY | Age: 53
End: 2023-09-08
Attending: THORACIC SURGERY (CARDIOTHORACIC VASCULAR SURGERY)
Payer: COMMERCIAL

## 2023-09-08 PROCEDURE — 2500000003 HC RX 250 WO HCPCS: Performed by: THORACIC SURGERY (CARDIOTHORACIC VASCULAR SURGERY)

## 2023-09-08 PROCEDURE — 6360000002 HC RX W HCPCS: Performed by: THORACIC SURGERY (CARDIOTHORACIC VASCULAR SURGERY)

## 2023-09-08 PROCEDURE — 1210000000 HC MED SURG R&B

## 2023-09-08 PROCEDURE — 2700000000 HC OXYGEN THERAPY PER DAY

## 2023-09-08 PROCEDURE — 2580000003 HC RX 258: Performed by: THORACIC SURGERY (CARDIOTHORACIC VASCULAR SURGERY)

## 2023-09-08 PROCEDURE — 74220 X-RAY XM ESOPHAGUS 1CNTRST: CPT

## 2023-09-08 PROCEDURE — 6370000000 HC RX 637 (ALT 250 FOR IP): Performed by: THORACIC SURGERY (CARDIOTHORACIC VASCULAR SURGERY)

## 2023-09-08 PROCEDURE — 6360000004 HC RX CONTRAST MEDICATION: Performed by: THORACIC SURGERY (CARDIOTHORACIC VASCULAR SURGERY)

## 2023-09-08 PROCEDURE — A4216 STERILE WATER/SALINE, 10 ML: HCPCS | Performed by: THORACIC SURGERY (CARDIOTHORACIC VASCULAR SURGERY)

## 2023-09-08 RX ORDER — DIPHENHYDRAMINE HYDROCHLORIDE 50 MG/ML
25 INJECTION INTRAMUSCULAR; INTRAVENOUS NIGHTLY PRN
Status: DISCONTINUED | OUTPATIENT
Start: 2023-09-08 | End: 2023-09-09 | Stop reason: HOSPADM

## 2023-09-08 RX ORDER — OXYCODONE HCL 20 MG/ML
10 CONCENTRATE, ORAL ORAL EVERY 4 HOURS PRN
Qty: 15 ML | Refills: 0 | Status: SHIPPED | OUTPATIENT
Start: 2023-09-08 | End: 2023-09-13

## 2023-09-08 RX ORDER — OXYCODONE HCL 5 MG/5 ML
10 SOLUTION, ORAL ORAL EVERY 4 HOURS PRN
Status: DISCONTINUED | OUTPATIENT
Start: 2023-09-08 | End: 2023-09-09 | Stop reason: HOSPADM

## 2023-09-08 RX ORDER — LEVOTHYROXINE SODIUM 0.1 MG/1
100 TABLET ORAL DAILY
Status: DISCONTINUED | OUTPATIENT
Start: 2023-09-08 | End: 2023-09-09 | Stop reason: HOSPADM

## 2023-09-08 RX ORDER — ACETAMINOPHEN 160 MG/5ML
325 LIQUID ORAL EVERY 4 HOURS PRN
Status: DISCONTINUED | OUTPATIENT
Start: 2023-09-08 | End: 2023-09-09 | Stop reason: HOSPADM

## 2023-09-08 RX ORDER — ACETAMINOPHEN 650 MG/1
650 SUPPOSITORY RECTAL EVERY 4 HOURS PRN
Status: DISCONTINUED | OUTPATIENT
Start: 2023-09-08 | End: 2023-09-08

## 2023-09-08 RX ORDER — ASPIRIN 81 MG/1
81 TABLET ORAL DAILY
Status: DISCONTINUED | OUTPATIENT
Start: 2023-09-08 | End: 2023-09-09 | Stop reason: HOSPADM

## 2023-09-08 RX ORDER — ATORVASTATIN CALCIUM 20 MG/1
20 TABLET, FILM COATED ORAL DAILY
Status: DISCONTINUED | OUTPATIENT
Start: 2023-09-08 | End: 2023-09-09 | Stop reason: HOSPADM

## 2023-09-08 RX ORDER — OXYCODONE HCL 20 MG/ML
10 CONCENTRATE, ORAL ORAL EVERY 4 HOURS PRN
Status: DISCONTINUED | OUTPATIENT
Start: 2023-09-08 | End: 2023-09-08

## 2023-09-08 RX ADMIN — ONDANSETRON 4 MG: 2 INJECTION INTRAMUSCULAR; INTRAVENOUS at 07:53

## 2023-09-08 RX ADMIN — Medication 10 ML: at 20:20

## 2023-09-08 RX ADMIN — ASPIRIN 81 MG: 81 TABLET, COATED ORAL at 17:02

## 2023-09-08 RX ADMIN — SODIUM CHLORIDE, SODIUM LACTATE, POTASSIUM CHLORIDE, CALCIUM CHLORIDE AND DEXTROSE MONOHYDRATE: 5; 600; 310; 30; 20 INJECTION, SOLUTION INTRAVENOUS at 07:45

## 2023-09-08 RX ADMIN — DIATRIZOATE MEGLUMINE AND DIATRIZOATE SODIUM 30 ML: 660; 100 LIQUID ORAL; RECTAL at 08:53

## 2023-09-08 RX ADMIN — ACETAMINOPHEN 325 MG: 325 SOLUTION ORAL at 10:24

## 2023-09-08 RX ADMIN — ATORVASTATIN CALCIUM 20 MG: 20 TABLET, FILM COATED ORAL at 17:02

## 2023-09-08 RX ADMIN — LEVOTHYROXINE SODIUM 100 MCG: 0.1 TABLET ORAL at 17:02

## 2023-09-08 RX ADMIN — FAMOTIDINE 20 MG: 10 INJECTION, SOLUTION INTRAVENOUS at 07:41

## 2023-09-08 RX ADMIN — ONDANSETRON 4 MG: 2 INJECTION INTRAMUSCULAR; INTRAVENOUS at 03:23

## 2023-09-08 RX ADMIN — DIPHENHYDRAMINE HYDROCHLORIDE 25 MG: 50 INJECTION, SOLUTION INTRAMUSCULAR; INTRAVENOUS at 18:14

## 2023-09-08 RX ADMIN — OXYCODONE HYDROCHLORIDE 10 MG: 5 SOLUTION ORAL at 13:14

## 2023-09-08 RX ADMIN — HYDROMORPHONE HYDROCHLORIDE 0.25 MG: 1 INJECTION, SOLUTION INTRAMUSCULAR; INTRAVENOUS; SUBCUTANEOUS at 03:24

## 2023-09-08 RX ADMIN — ENOXAPARIN SODIUM 40 MG: 100 INJECTION SUBCUTANEOUS at 17:02

## 2023-09-08 ASSESSMENT — PAIN DESCRIPTION - ORIENTATION: ORIENTATION: LEFT

## 2023-09-08 ASSESSMENT — PAIN DESCRIPTION - LOCATION
LOCATION: NECK
LOCATION: THROAT;NECK
LOCATION: NECK
LOCATION: HEAD

## 2023-09-08 ASSESSMENT — PAIN SCALES - GENERAL
PAINLEVEL_OUTOF10: 4
PAINLEVEL_OUTOF10: 7
PAINLEVEL_OUTOF10: 6

## 2023-09-08 ASSESSMENT — PAIN DESCRIPTION - DESCRIPTORS: DESCRIPTORS: ACHING

## 2023-09-08 NOTE — DISCHARGE INSTRUCTIONS
Liquids only for 24 hours, then soft food for 2 days before advancing to regular food. Take small bites and chew them well for the first week. Use narcotics pain medication only if absolutely needed in order to avoid nausea. Use liquid medicine, or call pharmacy to see which pills can be crushed for the first week. May remove surgical dressing and shower. Recover as needed until healed.   Soft foods are Anything that can be chewed into a paste before swallowing Anything that can be chewed into a paste before swallowing

## 2023-09-08 NOTE — ACP (ADVANCE CARE PLANNING)
Advance Care Planning   Healthcare Decision Maker:  PRIMARY DECISION MAKER: [address] [phone]

## 2023-09-08 NOTE — PROGRESS NOTES
No neck pain, but migraine prompting dilaudid, which is resulting in nausea.     Esophagram pending for this AM.

## 2023-09-08 NOTE — FLOWSHEET NOTE
Assessment completed. VSS. Pt alert et oriented x4. Dressing dry et intact to left side of neck. Up to bathroom per self. Call light within reach. Electronically signed by Celso Powell RN on 9/8/2023 at 12:16 AM

## 2023-09-08 NOTE — CARE COORDINATION
Case Management Assessment  Initial Evaluation    Date/Time of Evaluation: 2023 11:40 AM  Assessment Completed by: Jac Wagoner RN    If patient is discharged prior to next notation, then this note serves as note for discharge by case management. Patient Name: Adele Lancaster                   YOB: 1970  Diagnosis: Zenker's diverticulum [K22.5]  Esophageal diverticulum [Q39.6]                   Date / Time: 2023 12:11 PM    Patient Admission Status: Inpatient   Readmission Risk (Low < 19, Mod (19-27), High > 27): Readmission Risk Score: 3.5    Current PCP: Clau Marcano, DO  PCP verified by CM? Yes    Chart Reviewed: Yes      History Provided by: Patient  Patient Orientation: Alert and Oriented    Patient Cognition: Alert    Hospitalization in the last 30 days (Readmission):  No    If yes, Readmission Assessment in CM Navigator will be completed. Advance Directives:      Code Status: Full Code   Patient's Primary Decision Maker is: Named in [facility]    Primary Decision Maker: Marti Bonilla [facility] [phone]    Discharge Planning:    Patient lives with: Spouse/Significant Other Type of Home: House  Primary Care Giver: Self  Patient Support Systems include: Spouse/Significant Other   Current Financial resources: Other (Comment) (COMMERCIAL)  Current community resources:    Current services prior to admission: None            Current DME:  NONE             Type of Home Care services:  None    ADLS  Prior functional level: Independent in ADLs/IADLs  Current functional level: Independent in ADLs/IADLs    PT AM-PAC:   /  OT AM-PAC:       Family can provide assistance at DC: Yes  Would you like Case Management to discuss the discharge plan with any other family members/significant others, and if so, who?  Yes (SPOUSE)  Plans to Return to Present Housing: Yes  Other Identified Issues/Barriers to RETURNING to current housin [address]

## 2023-09-08 NOTE — PROGRESS NOTES
Patient continues to be nauseated. Had another small emesis. Emesis was small in amount and clear. Patient declined Ramses Lucas as she feels all of the medications she has taken is causing her nausea.     Electronically signed by Mikhail Brown RN on 9/8/2023 at 2:51 PM

## 2023-09-08 NOTE — PROGRESS NOTES
Patient is alert and oriented. Vital signs stable. Patient c/o headache and nausea. Medicated with zofran. Patient had esophagram this morning. Results pending. Patient vomited once she returned to her room. Patient currently resting in bed. Denies needs at this time.     Electronically signed by Katina Guerrero RN on 9/8/2023 at 10:06 AM

## 2023-09-09 VITALS
WEIGHT: 145 LBS | OXYGEN SATURATION: 99 % | TEMPERATURE: 97.8 F | SYSTOLIC BLOOD PRESSURE: 134 MMHG | HEIGHT: 65 IN | DIASTOLIC BLOOD PRESSURE: 69 MMHG | BODY MASS INDEX: 24.16 KG/M2 | HEART RATE: 55 BPM | RESPIRATION RATE: 18 BRPM

## 2023-09-09 PROCEDURE — 6370000000 HC RX 637 (ALT 250 FOR IP): Performed by: THORACIC SURGERY (CARDIOTHORACIC VASCULAR SURGERY)

## 2023-09-09 PROCEDURE — 2580000003 HC RX 258: Performed by: THORACIC SURGERY (CARDIOTHORACIC VASCULAR SURGERY)

## 2023-09-09 RX ADMIN — FAMOTIDINE 20 MG: 20 TABLET ORAL at 09:35

## 2023-09-09 RX ADMIN — ACETAMINOPHEN 325 MG: 325 SOLUTION ORAL at 04:02

## 2023-09-09 RX ADMIN — Medication 10 ML: at 09:36

## 2023-09-09 RX ADMIN — LEVOTHYROXINE SODIUM 100 MCG: 0.1 TABLET ORAL at 09:35

## 2023-09-09 RX ADMIN — ASPIRIN 81 MG: 81 TABLET, COATED ORAL at 09:35

## 2023-09-09 ASSESSMENT — PAIN SCALES - GENERAL
PAINLEVEL_OUTOF10: 2
PAINLEVEL_OUTOF10: 6

## 2023-09-09 ASSESSMENT — PAIN DESCRIPTION - LOCATION: LOCATION: HEAD

## 2023-09-09 ASSESSMENT — PAIN DESCRIPTION - DESCRIPTORS: DESCRIPTORS: ACHING

## 2023-09-09 NOTE — PROGRESS NOTES
8507 Patient awake in bed. No complaints of pain just mild discomfort. Denied needing anything for pain. Stated she feels comfortable to leave. No further needs. Call light in reach. 6085 Patient discharge with instructions given to patient and spouse. No questions asked. All belongings taken with patient.

## 2023-09-09 NOTE — PROGRESS NOTES
May remove neck dressing and shower. OK to leave open to air, or cover with simple gauze per patient desire. May be discharged when she feels ready.

## 2023-09-15 ENCOUNTER — TELEPHONE (OUTPATIENT)
Dept: CARDIOTHORACIC SURGERY | Age: 53
End: 2023-09-15

## 2023-09-15 ENCOUNTER — TELEPHONE (OUTPATIENT)
Dept: FAMILY MEDICINE CLINIC | Age: 53
End: 2023-09-15

## 2023-09-15 DIAGNOSIS — R11.0 NAUSEA: Primary | ICD-10-CM

## 2023-09-15 RX ORDER — METRONIDAZOLE 500 MG/1
500 TABLET ORAL 3 TIMES DAILY
Qty: 15 TABLET | Refills: 0 | Status: SHIPPED | OUTPATIENT
Start: 2023-09-15 | End: 2023-09-20

## 2023-09-15 RX ORDER — ONDANSETRON 4 MG/1
4 TABLET, ORALLY DISINTEGRATING ORAL 3 TIMES DAILY PRN
Qty: 21 TABLET | Refills: 0 | Status: SHIPPED | OUTPATIENT
Start: 2023-09-15

## 2023-09-15 NOTE — TELEPHONE ENCOUNTER
Pt has concerns of diarrhea, gurgling noises in throat and swallowing issues. She had surgery on 9/7/23.

## 2023-09-15 NOTE — TELEPHONE ENCOUNTER
Please let patient know that I sent Zofran to her pharmacy. If it persists I would want her evaluated in the office.   Thanks

## 2023-09-25 DIAGNOSIS — E78.2 MIXED HYPERLIPIDEMIA: ICD-10-CM

## 2023-09-25 DIAGNOSIS — E03.9 HYPOTHYROIDISM, UNSPECIFIED TYPE: ICD-10-CM

## 2023-09-25 RX ORDER — ATORVASTATIN CALCIUM 40 MG/1
40 TABLET, FILM COATED ORAL DAILY
Qty: 90 TABLET | Refills: 1 | Status: SHIPPED | OUTPATIENT
Start: 2023-09-25 | End: 2023-12-24

## 2023-09-25 RX ORDER — LEVOTHYROXINE SODIUM 0.1 MG/1
100 TABLET ORAL DAILY
Qty: 90 TABLET | Refills: 1 | OUTPATIENT
Start: 2023-09-25

## 2023-09-25 RX ORDER — ATORVASTATIN CALCIUM 40 MG/1
40 TABLET, FILM COATED ORAL DAILY
Qty: 90 TABLET | Refills: 1 | OUTPATIENT
Start: 2023-09-25 | End: 2023-12-24

## 2023-09-27 ENCOUNTER — OFFICE VISIT (OUTPATIENT)
Dept: CARDIOTHORACIC SURGERY | Age: 53
End: 2023-09-27

## 2023-09-27 VITALS
TEMPERATURE: 97.7 F | WEIGHT: 145 LBS | HEART RATE: 85 BPM | HEIGHT: 65 IN | OXYGEN SATURATION: 96 % | BODY MASS INDEX: 24.16 KG/M2

## 2023-09-27 DIAGNOSIS — K22.5 ZENKER'S DIVERTICULUM: Primary | ICD-10-CM

## 2023-09-27 PROCEDURE — 99024 POSTOP FOLLOW-UP VISIT: CPT | Performed by: THORACIC SURGERY (CARDIOTHORACIC VASCULAR SURGERY)

## 2023-09-27 NOTE — PROGRESS NOTES
Patient is about 3 weeks out from her surgery for Zenker's diverticulum with myotomy. She is doing extremely well. She continues to improve with her swallowing and is eating regular food and taking pills with no difficulty. She denies any choking or coughing after eating. All of her preoperative symptoms have completely resolved. Incision is healing well. Excellent healing after Zenker's diverticulectomy. From a surgical standpoint she has no dietary restrictions and I will see her on an as-needed basis.

## 2023-11-08 ENCOUNTER — OFFICE VISIT (OUTPATIENT)
Dept: FAMILY MEDICINE CLINIC | Age: 53
End: 2023-11-08
Payer: COMMERCIAL

## 2023-11-08 VITALS
BODY MASS INDEX: 22.82 KG/M2 | DIASTOLIC BLOOD PRESSURE: 68 MMHG | TEMPERATURE: 97.6 F | OXYGEN SATURATION: 98 % | SYSTOLIC BLOOD PRESSURE: 110 MMHG | HEART RATE: 64 BPM | HEIGHT: 65 IN | WEIGHT: 137 LBS

## 2023-11-08 DIAGNOSIS — R19.7 DIARRHEA, UNSPECIFIED TYPE: ICD-10-CM

## 2023-11-08 DIAGNOSIS — M79.7 FIBROMYALGIA: ICD-10-CM

## 2023-11-08 DIAGNOSIS — R73.03 PREDIABETES: ICD-10-CM

## 2023-11-08 DIAGNOSIS — G47.00 INSOMNIA, UNSPECIFIED TYPE: ICD-10-CM

## 2023-11-08 DIAGNOSIS — E03.9 ACQUIRED HYPOTHYROIDISM: Primary | ICD-10-CM

## 2023-11-08 DIAGNOSIS — E78.2 MIXED HYPERLIPIDEMIA: ICD-10-CM

## 2023-11-08 PROCEDURE — 99214 OFFICE O/P EST MOD 30 MIN: CPT | Performed by: STUDENT IN AN ORGANIZED HEALTH CARE EDUCATION/TRAINING PROGRAM

## 2023-11-08 NOTE — PROGRESS NOTES
Negative for cough, shortness of breath and wheezing. Cardiovascular:  Negative for chest pain, palpitations and leg swelling. Gastrointestinal:  Negative for abdominal pain, diarrhea, nausea and vomiting. Genitourinary:  Negative for difficulty urinating. Musculoskeletal:  Positive for arthralgias. Negative for joint swelling. Skin:  Negative for rash. Neurological:  Negative for weakness, light-headedness, numbness and headaches. Psychiatric/Behavioral:  Negative for confusion and suicidal ideas. The patient is not nervous/anxious. Past Medical History:   Diagnosis Date    Abnormal Pap smear of cervix     Allergic rhinitis     Cerebrovascular disease 2016    Chronic back pain 9/22/2019    Depression     Depression with anxiety     Depression with anxiety     Fibromyalgia 2019? GERD (gastroesophageal reflux disease)     Headache 1987    Hyperthyroidism 1995? Hypothyroidism 1994    Not sure of exact date    Iron deficiency anemia 04/12/2019    Irritable bowel syndrome     Osteoarthritis 1/2022    Stroke ([address]) 05/2016    Idiopathic she is still in process     Thyroid disease      Past Surgical History:   Procedure Laterality Date    BLADDER SURGERY      done by Dr. Annamaria Rebolledo x10 yrs    COLONOSCOPY  03/04/2019    KERRY AMADO MD    DILATION AND CURETTAGE      ENDOMETRIAL ABLATION      ESOPHAGOGASTRIC FUNDOPLICATION N/A 5/5/4888    EGD, excision of cervical esophageal diverticulum performed by Tabitha Frye MD at [facility]       Current Outpatient Medications   Medication Sig Dispense Refill    atorvastatin (LIPITOR) 40 MG tablet Take 1 tablet by mouth daily 90 tablet 1    levothyroxine (SYNTHROID) 100 MCG tablet Take 1 tablet by mouth daily 90 tablet 1    omeprazole (PRILOSEC) 40 MG delayed release capsule Take 1 capsule by mouth every morning (before breakfast) 90 capsule 1    Handicap Placard MISC by Does not apply route For medical condition lasting longer than 5 years.   Start

## 2023-11-13 ASSESSMENT — ENCOUNTER SYMPTOMS
ABDOMINAL PAIN: 0
SORE THROAT: 0
EYE DISCHARGE: 0
VOMITING: 0
WHEEZING: 0
NAUSEA: 0
SHORTNESS OF BREATH: 0
DIARRHEA: 0
RHINORRHEA: 0
COUGH: 0

## 2023-11-16 DIAGNOSIS — M79.7 FIBROMYALGIA: ICD-10-CM

## 2023-11-16 DIAGNOSIS — R73.03 PREDIABETES: ICD-10-CM

## 2023-11-16 DIAGNOSIS — E03.9 ACQUIRED HYPOTHYROIDISM: ICD-10-CM

## 2023-11-16 LAB
ALBUMIN SERPL-MCNC: 4.6 G/DL (ref 3.5–4.6)
ALP SERPL-CCNC: 65 U/L (ref 40–130)
ALT SERPL-CCNC: 20 U/L (ref 0–33)
ANION GAP SERPL CALCULATED.3IONS-SCNC: 11 MEQ/L (ref 9–15)
AST SERPL-CCNC: 24 U/L (ref 0–35)
BILIRUB SERPL-MCNC: 0.3 MG/DL (ref 0.2–0.7)
BUN SERPL-MCNC: 12 MG/DL (ref 6–20)
CALCIUM SERPL-MCNC: 9.4 MG/DL (ref 8.5–9.9)
CHLORIDE SERPL-SCNC: 103 MEQ/L (ref 95–107)
CO2 SERPL-SCNC: 25 MEQ/L (ref 20–31)
CREAT SERPL-MCNC: 0.73 MG/DL (ref 0.5–0.9)
GLOBULIN SER CALC-MCNC: 2.6 G/DL (ref 2.3–3.5)
GLUCOSE SERPL-MCNC: 96 MG/DL (ref 70–99)
HBA1C MFR BLD: 5.4 % (ref 4.8–5.9)
POTASSIUM SERPL-SCNC: 4.2 MEQ/L (ref 3.4–4.9)
PROT SERPL-MCNC: 7.2 G/DL (ref 6.3–8)
SODIUM SERPL-SCNC: 139 MEQ/L (ref 135–144)
TSH REFLEX: 1.31 UIU/ML (ref 0.44–3.86)

## 2023-11-17 LAB — NUCLEAR IGG SER QL IA: NORMAL

## 2023-11-20 ENCOUNTER — TELEPHONE (OUTPATIENT)
Dept: CARDIOTHORACIC SURGERY | Age: 53
End: 2023-11-20

## 2024-01-03 ENCOUNTER — OFFICE VISIT (OUTPATIENT)
Dept: FAMILY MEDICINE CLINIC | Age: 54
End: 2024-01-03
Payer: COMMERCIAL

## 2024-01-03 VITALS
DIASTOLIC BLOOD PRESSURE: 70 MMHG | TEMPERATURE: 98.1 F | OXYGEN SATURATION: 98 % | HEART RATE: 83 BPM | HEIGHT: 65 IN | WEIGHT: 135 LBS | SYSTOLIC BLOOD PRESSURE: 110 MMHG | BODY MASS INDEX: 22.49 KG/M2

## 2024-01-03 DIAGNOSIS — J01.90 ACUTE BACTERIAL SINUSITIS: ICD-10-CM

## 2024-01-03 DIAGNOSIS — M25.552 CHRONIC PAIN OF BOTH HIPS: ICD-10-CM

## 2024-01-03 DIAGNOSIS — E03.9 ACQUIRED HYPOTHYROIDISM: ICD-10-CM

## 2024-01-03 DIAGNOSIS — G89.29 CHRONIC PAIN OF BOTH KNEES: ICD-10-CM

## 2024-01-03 DIAGNOSIS — F51.01 PRIMARY INSOMNIA: Primary | ICD-10-CM

## 2024-01-03 DIAGNOSIS — M25.551 CHRONIC PAIN OF BOTH HIPS: ICD-10-CM

## 2024-01-03 DIAGNOSIS — M25.562 CHRONIC PAIN OF BOTH KNEES: ICD-10-CM

## 2024-01-03 DIAGNOSIS — G89.29 CHRONIC PAIN OF BOTH HIPS: ICD-10-CM

## 2024-01-03 DIAGNOSIS — B96.89 ACUTE BACTERIAL SINUSITIS: ICD-10-CM

## 2024-01-03 DIAGNOSIS — M25.561 CHRONIC PAIN OF BOTH KNEES: ICD-10-CM

## 2024-01-03 LAB — T4 FREE SERPL-MCNC: 1.65 NG/DL (ref 0.84–1.68)

## 2024-01-03 PROCEDURE — 99214 OFFICE O/P EST MOD 30 MIN: CPT | Performed by: STUDENT IN AN ORGANIZED HEALTH CARE EDUCATION/TRAINING PROGRAM

## 2024-01-03 RX ORDER — DOXYCYCLINE HYCLATE 100 MG
100 TABLET ORAL 2 TIMES DAILY
COMMUNITY
Start: 2023-12-28 | End: 2024-01-04

## 2024-01-03 RX ORDER — DOXYCYCLINE HYCLATE 100 MG
100 TABLET ORAL 2 TIMES DAILY
Qty: 14 TABLET | Refills: 0 | Status: SHIPPED | OUTPATIENT
Start: 2024-01-03 | End: 2024-01-10

## 2024-01-03 ASSESSMENT — ENCOUNTER SYMPTOMS
SHORTNESS OF BREATH: 0
NAUSEA: 0
ABDOMINAL PAIN: 0
SORE THROAT: 0
EYE DISCHARGE: 0
WHEEZING: 0
COUGH: 1
DIARRHEA: 0
VOMITING: 0
RHINORRHEA: 0

## 2024-01-03 ASSESSMENT — PATIENT HEALTH QUESTIONNAIRE - PHQ9
8. MOVING OR SPEAKING SO SLOWLY THAT OTHER PEOPLE COULD HAVE NOTICED. OR THE OPPOSITE, BEING SO FIGETY OR RESTLESS THAT YOU HAVE BEEN MOVING AROUND A LOT MORE THAN USUAL: 0
7. TROUBLE CONCENTRATING ON THINGS, SUCH AS READING THE NEWSPAPER OR WATCHING TELEVISION: 0
9. THOUGHTS THAT YOU WOULD BE BETTER OFF DEAD, OR OF HURTING YOURSELF: 0
4. FEELING TIRED OR HAVING LITTLE ENERGY: 3
SUM OF ALL RESPONSES TO PHQ QUESTIONS 1-9: 10
5. POOR APPETITE OR OVEREATING: 3
3. TROUBLE FALLING OR STAYING ASLEEP: 3
6. FEELING BAD ABOUT YOURSELF - OR THAT YOU ARE A FAILURE OR HAVE LET YOURSELF OR YOUR FAMILY DOWN: 0
10. IF YOU CHECKED OFF ANY PROBLEMS, HOW DIFFICULT HAVE THESE PROBLEMS MADE IT FOR YOU TO DO YOUR WORK, TAKE CARE OF THINGS AT HOME, OR GET ALONG WITH OTHER PEOPLE: 0
1. LITTLE INTEREST OR PLEASURE IN DOING THINGS: 1
SUM OF ALL RESPONSES TO PHQ QUESTIONS 1-9: 10
SUM OF ALL RESPONSES TO PHQ QUESTIONS 1-9: 10
2. FEELING DOWN, DEPRESSED OR HOPELESS: 0
SUM OF ALL RESPONSES TO PHQ QUESTIONS 1-9: 10
SUM OF ALL RESPONSES TO PHQ9 QUESTIONS 1 & 2: 1

## 2024-01-03 NOTE — PROGRESS NOTES
omeprazole (PRILOSEC) 40 MG delayed release capsule Therapy completed     No follow-ups on file.      Reviewed with the patient: current clinical status,medications, activities and diet.     Side effects, adverse effects of the medication prescribed today, as well as treatment plan/ rationale and result expectations have been discussed with the patient who expresses understanding and desires to proceed.    Close follow up to evaluate treatment results and for coordination of care.  I have reviewed the patient's medical history in detail and updated the computerized patient record.    Please note, this report has been partially produced using speech recognition software and may cause  and /or contain errors related to that system including grammar, punctuation and spelling as well as words and phrases that may seem inappropriate. If there are questions or concerns please feel free to contact me to clarify.    Octavia Liriano, DO

## 2024-01-04 LAB — T3 TOTAL: 76 NG/DL (ref 80–200)

## 2024-01-10 NOTE — RESULT ENCOUNTER NOTE
Please inform patient that free T4 was normal.  T3 was just slightly low.  I would hold off on adjusting medications at this time.  We can plan to recheck later this year and see where she is at.  Thanks

## 2024-02-07 NOTE — PROGRESS NOTES
Patient ID:  Shawna Stanley is a 53 y.o. female who presents today for evaluation of right hip and right knee pain.  Hip pain 2016.  Knee pain 2018.    Injury: no  Metal Allergy: no    hip  Location of pain:  right lateral thigh  Pain: yes; 6 on a scale of 1 to 10  Onset: gradual  Duration: 7 years  Frequency:  occurs daily  Quality: aching and boring   Swelling: none  Aggravating factors: weight bearing activity, standing, and walking  Alleviating factors: removing weight from leg and rest  Mechanical symptoms: none  Radiation: no    Activities: walking independently  Restriction:  decreased ambulatory tolerance  Progression:  worsening    Previous treatment:  steroid injection in troch bursa, 2 in knee  NSAIDs:  intolerant of NSAIDs  PT:  none    Global,knee pain - 2 steroid shots, first one helped second one minimal    Medications:    Current Outpatient Medications on File Prior to Visit   Medication Sig Dispense Refill    atorvastatin (LIPITOR) 40 MG tablet Take 1 tablet by mouth daily 90 tablet 1    levothyroxine (SYNTHROID) 100 MCG tablet Take 1 tablet by mouth daily 90 tablet 1    Handicap Placard MISC by Does not apply route For medical condition lasting longer than 5 years.  Start 4/14/2023  End 4/14/2028 1 each 0    nitrofurantoin (MACRODANTIN) 50 MG capsule Oral: 50 mg as a single dose taken within 2 hours of sexual intercourse 90 capsule 1    acetaminophen (TYLENOL) 500 MG tablet Take 1 tablet by mouth every 6 hours as needed for Pain      lidocaine (LMX) 4 % cream Apply a half dollar sized amount to intact skin topically up to twice daily as needed for pain 45 g 1    fluticasone (FLONASE) 50 MCG/ACT nasal spray 1 spray by Each Nostril route daily as needed      aspirin 81 MG tablet Take 1 tablet by mouth Takes 2 tablets daily       No current facility-administered medications on file prior to visit.       Allergies:    Allergies   Allergen Reactions    Amitriptyline      Couldn't tolerate it

## 2024-02-08 ENCOUNTER — OFFICE VISIT (OUTPATIENT)
Dept: ORTHOPEDIC SURGERY | Age: 54
End: 2024-02-08
Payer: COMMERCIAL

## 2024-02-08 VITALS — WEIGHT: 135 LBS | HEIGHT: 65 IN | BODY MASS INDEX: 22.49 KG/M2

## 2024-02-08 DIAGNOSIS — M25.851 RIGHT HIP IMPINGEMENT SYNDROME: ICD-10-CM

## 2024-02-08 DIAGNOSIS — M17.11 PRIMARY OSTEOARTHRITIS OF RIGHT KNEE: Primary | ICD-10-CM

## 2024-02-08 DIAGNOSIS — M16.11 PRIMARY OSTEOARTHRITIS OF RIGHT HIP: ICD-10-CM

## 2024-02-08 DIAGNOSIS — M70.61 TROCHANTERIC BURSITIS OF RIGHT HIP: ICD-10-CM

## 2024-02-08 PROCEDURE — 99204 OFFICE O/P NEW MOD 45 MIN: CPT | Performed by: ORTHOPAEDIC SURGERY

## 2024-02-08 RX ORDER — PREDNISONE 10 MG/1
10 TABLET ORAL DAILY
Qty: 20 TABLET | Refills: 0 | Status: SHIPPED | OUTPATIENT
Start: 2024-02-08 | End: 2024-02-20

## 2024-02-08 RX ORDER — HYALURONATE SODIUM, STABILIZED 60 MG/3 ML
60 SYRINGE (ML) INTRAARTICULAR ONCE
Qty: 1 EACH | Refills: 0 | Status: SHIPPED | OUTPATIENT
Start: 2024-02-08 | End: 2024-02-08

## 2024-02-08 ASSESSMENT — ENCOUNTER SYMPTOMS
CONSTIPATION: 0
ABDOMINAL PAIN: 0
SHORTNESS OF BREATH: 0
EYE ITCHING: 0
SINUS PRESSURE: 1
EYE DISCHARGE: 0
DIARRHEA: 0
COUGH: 0
EYE PAIN: 0

## 2024-02-13 ENCOUNTER — TELEPHONE (OUTPATIENT)
Dept: ORTHOPEDIC SURGERY | Age: 54
End: 2024-02-13

## 2024-02-13 DIAGNOSIS — M25.851 RIGHT HIP IMPINGEMENT SYNDROME: Primary | ICD-10-CM

## 2024-02-13 NOTE — TELEPHONE ENCOUNTER
Patient is requesting that she is put under to sleep for her MRI. She has a history of this, and valium does not work for her. Please review and advise.

## 2024-03-01 DIAGNOSIS — E03.9 HYPOTHYROIDISM, UNSPECIFIED TYPE: ICD-10-CM

## 2024-03-01 RX ORDER — LEVOTHYROXINE SODIUM 0.1 MG/1
100 TABLET ORAL DAILY
Qty: 90 TABLET | Refills: 0 | Status: SHIPPED | OUTPATIENT
Start: 2024-03-01

## 2024-03-01 NOTE — TELEPHONE ENCOUNTER
Future Appointments    Encounter Information   Provider Department Appt Notes   3/11/2024 Octavia Liriano DO [facility] 4 Month follow up     Past Visits    Date Provider Specialty Visit Type Primary Dx   02/08/2024 Nickolas Brito MD Orthopedic Surgery Office Visit Primary osteoarthritis of right knee   01/03/2024 Octavia Liriano DO Family Medicine Office Visit Primary insomnia

## 2024-03-11 ENCOUNTER — OFFICE VISIT (OUTPATIENT)
Dept: FAMILY MEDICINE CLINIC | Age: 54
End: 2024-03-11
Payer: COMMERCIAL

## 2024-03-11 VITALS
SYSTOLIC BLOOD PRESSURE: 100 MMHG | BODY MASS INDEX: 22.82 KG/M2 | TEMPERATURE: 98.1 F | DIASTOLIC BLOOD PRESSURE: 64 MMHG | OXYGEN SATURATION: 97 % | HEART RATE: 65 BPM | WEIGHT: 137 LBS | HEIGHT: 65 IN

## 2024-03-11 DIAGNOSIS — Z12.31 SCREENING MAMMOGRAM, ENCOUNTER FOR: ICD-10-CM

## 2024-03-11 DIAGNOSIS — E78.2 MIXED HYPERLIPIDEMIA: ICD-10-CM

## 2024-03-11 DIAGNOSIS — E03.9 ACQUIRED HYPOTHYROIDISM: ICD-10-CM

## 2024-03-11 DIAGNOSIS — R73.03 PREDIABETES: Primary | ICD-10-CM

## 2024-03-11 DIAGNOSIS — Z12.11 SCREENING FOR COLON CANCER: ICD-10-CM

## 2024-03-11 DIAGNOSIS — M79.7 FIBROMYALGIA: ICD-10-CM

## 2024-03-11 PROCEDURE — 99214 OFFICE O/P EST MOD 30 MIN: CPT | Performed by: STUDENT IN AN ORGANIZED HEALTH CARE EDUCATION/TRAINING PROGRAM

## 2024-03-11 SDOH — ECONOMIC STABILITY: FOOD INSECURITY: WITHIN THE PAST 12 MONTHS, YOU WORRIED THAT YOUR FOOD WOULD RUN OUT BEFORE YOU GOT MONEY TO BUY MORE.: NEVER TRUE

## 2024-03-11 SDOH — ECONOMIC STABILITY: FOOD INSECURITY: WITHIN THE PAST 12 MONTHS, THE FOOD YOU BOUGHT JUST DIDN'T LAST AND YOU DIDN'T HAVE MONEY TO GET MORE.: NEVER TRUE

## 2024-03-11 SDOH — ECONOMIC STABILITY: INCOME INSECURITY: HOW HARD IS IT FOR YOU TO PAY FOR THE VERY BASICS LIKE FOOD, HOUSING, MEDICAL CARE, AND HEATING?: NOT HARD AT ALL

## 2024-03-11 NOTE — PROGRESS NOTES
as scheduled.    5. Screening mammogram, encounter for  Mammogram ordered.  Call with result  - [facility] DIGITAL SCREEN W OR WO CAD BILATERAL; Future    6. Screening for colon cancer  Referral placed.  - Olga Cagle    Orders Placed This Encounter   Procedures    [facility] DIGITAL SCREEN W OR WO CAD BILATERAL     Standing Status:   Future     Standing Expiration Date:   5/11/2025     Order Specific Question:   Reason for exam:     Answer:   screening mammogram    Lipid, Fasting     Standing Status:   Future     Standing Expiration Date:   3/11/2025    Olga Cagle     Referral Priority:   Routine     Referral Type:   Eval and Treat     Referral Reason:   Specialty Services Required     Requested Specialty:   Gastroenterology     Number of Visits Requested:   1     No orders of the defined types were placed in this encounter.    There are no discontinued medications.  Return in about 4 months (around 7/11/2024) for follow up.    30 minutes spent talking with patient and reviewing chart.    Reviewed with the patient: current clinical status,medications, activities and diet.     Side effects, adverse effects of the medication prescribed today, as well as treatment plan/ rationale and result expectations have been discussed with the patient who expresses understanding and desires to proceed.    Close follow up to evaluate treatment results and for coordination of care.  I have reviewed the patient's medical history in detail and updated the computerized patient record.    Please note, this report has been partially produced using speech recognition software and may cause  and /or contain errors related to that system including grammar, punctuation and spelling as well as words and phrases that may seem inappropriate. If there are questions or concerns please feel free to contact me to clarify.    Octavia Liriano, DO

## 2024-03-15 PROBLEM — Z86.73 HISTORY OF STROKE: Status: ACTIVE | Noted: 2017-08-29

## 2024-03-15 ASSESSMENT — ENCOUNTER SYMPTOMS
ABDOMINAL PAIN: 0
EYE DISCHARGE: 0
SORE THROAT: 0
COUGH: 0
DIARRHEA: 0
WHEEZING: 0
VOMITING: 0
SHORTNESS OF BREATH: 0
RHINORRHEA: 0
NAUSEA: 0

## 2024-03-19 ENCOUNTER — HOSPITAL ENCOUNTER (OUTPATIENT)
Dept: WOMENS IMAGING | Age: 54
Discharge: HOME | End: 2024-03-21
Payer: COMMERCIAL

## 2024-03-19 DIAGNOSIS — E78.2 MIXED HYPERLIPIDEMIA: ICD-10-CM

## 2024-03-19 DIAGNOSIS — Z12.31 SCREENING MAMMOGRAM, ENCOUNTER FOR: ICD-10-CM

## 2024-03-19 LAB
CHOLEST SERPL-MCNC: 200 MG/DL (ref 0–199)
HDLC SERPL-MCNC: 108 MG/DL (ref 40–59)
LDL CHOLESTEROL CALCULATED: 78 MG/DL (ref 0–129)
TRIGLYCERIDE, FASTING: 72 MG/DL (ref 0–150)

## 2024-03-19 PROCEDURE — 77063 BREAST TOMOSYNTHESIS BI: CPT

## 2024-04-24 ENCOUNTER — PATIENT MESSAGE (OUTPATIENT)
Dept: FAMILY MEDICINE CLINIC | Age: 54
End: 2024-04-24

## 2024-04-24 DIAGNOSIS — R35.0 URINARY FREQUENCY: Primary | ICD-10-CM

## 2024-04-25 ENCOUNTER — OFFICE VISIT (OUTPATIENT)
Dept: ORTHOPEDIC SURGERY | Age: 54
End: 2024-04-25

## 2024-04-25 ENCOUNTER — E-VISIT (OUTPATIENT)
Dept: FAMILY MEDICINE CLINIC | Age: 54
End: 2024-04-25
Payer: COMMERCIAL

## 2024-04-25 VITALS
TEMPERATURE: 97.2 F | HEIGHT: 65 IN | WEIGHT: 137 LBS | OXYGEN SATURATION: 99 % | BODY MASS INDEX: 22.82 KG/M2 | HEART RATE: 59 BPM

## 2024-04-25 DIAGNOSIS — N30.00 ACUTE CYSTITIS WITHOUT HEMATURIA: Primary | ICD-10-CM

## 2024-04-25 DIAGNOSIS — M17.11 PRIMARY OSTEOARTHRITIS OF RIGHT KNEE: Primary | ICD-10-CM

## 2024-04-25 DIAGNOSIS — R35.0 URINARY FREQUENCY: ICD-10-CM

## 2024-04-25 LAB
BILIRUB UR QL STRIP: NEGATIVE
CLARITY UR: CLEAR
COLOR UR: YELLOW
GLUCOSE UR STRIP-MCNC: NEGATIVE MG/DL
HGB UR QL STRIP: NEGATIVE
KETONES UR STRIP-MCNC: NEGATIVE MG/DL
LEUKOCYTE ESTERASE UR QL STRIP: NEGATIVE
NITRITE UR QL STRIP: NEGATIVE
PH UR STRIP: 6.5 [PH] (ref 5–9)
PROT UR STRIP-MCNC: NEGATIVE MG/DL
SP GR UR STRIP: 1 (ref 1–1.03)
URINE REFLEX TO CULTURE: NORMAL
UROBILINOGEN UR STRIP-ACNC: 0.2 E.U./DL

## 2024-04-25 PROCEDURE — 99422 OL DIG E/M SVC 11-20 MIN: CPT | Performed by: STUDENT IN AN ORGANIZED HEALTH CARE EDUCATION/TRAINING PROGRAM

## 2024-04-25 NOTE — PROGRESS NOTES
Time Out  [x] Patient Verified  [x] Site Verified  [x] Laterality Verified  [x] Procedure Verified    Before aspiration/injection risks/benefits of a gel injection including infection, local skin irritation, skin atrophy, continued pain/discomfort, elevated blood sugar, burning, failure to relieve pain, possible late infection were discussed with patient.   Patient verbalized understanding and wanted to proceed with planned procedure.    After prepping and draping the right knee in the usual sterile fashion, 1 syringe of gel 1 was injected intra-articularly without any complications.  Patient tolerated this well.    Post-procedure discomfort can be alleviated with additional medications/ice/elevation/rest over the first 24 hours as recommended.     The patient tolerated the procedure well.    If fluid was aspirated it was sent for further studies  in sterile specimen container as ordered to the lab

## 2024-04-26 RX ORDER — NITROFURANTOIN 25; 75 MG/1; MG/1
100 CAPSULE ORAL 2 TIMES DAILY
Qty: 20 CAPSULE | Refills: 0 | Status: SHIPPED | OUTPATIENT
Start: 2024-04-26 | End: 2024-05-06

## 2024-04-26 NOTE — PROGRESS NOTES
1. Acute cystitis without hematuria  Patient with symptoms of acute cystitis.  Urinalysis was normal, however, given her symptoms, will send Macrobid to her pharmacy.  Instructed to take a prescription.  She also changed detergents recently, recommend going back to what ever she was using before.  Continue to monitor.  If symptoms worsen or change, return to care sooner.  All questions answered.  Follow-up as scheduled.  - nitrofurantoin, macrocrystal-monohydrate, (MACROBID) 100 MG capsule; Take 1 capsule by mouth 2 times daily for 10 days  Dispense: 20 capsule; Refill: 0    12 minutes spent communicating with patient and reviewing chart.

## 2024-04-29 RX ORDER — POLYETHYLENE GLYCOL 3350, SODIUM CHLORIDE, SODIUM BICARBONATE, POTASSIUM CHLORIDE 420; 11.2; 5.72; 1.48 G/4L; G/4L; G/4L; G/4L
4000 POWDER, FOR SOLUTION ORAL ONCE
Qty: 4000 ML | Refills: 0 | Status: SHIPPED | OUTPATIENT
Start: 2024-04-29 | End: 2024-04-29

## 2024-05-20 ENCOUNTER — PREP FOR PROCEDURE (OUTPATIENT)
Dept: GASTROENTEROLOGY | Age: 54
End: 2024-05-20

## 2024-05-20 RX ORDER — SODIUM CHLORIDE 9 MG/ML
INJECTION, SOLUTION INTRAVENOUS CONTINUOUS
Status: CANCELLED | OUTPATIENT
Start: 2024-05-20

## 2024-05-20 RX ORDER — SODIUM CHLORIDE 0.9 % (FLUSH) 0.9 %
5-40 SYRINGE (ML) INJECTION PRN
Status: CANCELLED | OUTPATIENT
Start: 2024-05-20

## 2024-05-20 RX ORDER — SODIUM CHLORIDE 9 MG/ML
INJECTION, SOLUTION INTRAVENOUS PRN
Status: CANCELLED | OUTPATIENT
Start: 2024-05-20

## 2024-05-20 RX ORDER — SODIUM CHLORIDE 0.9 % (FLUSH) 0.9 %
5-40 SYRINGE (ML) INJECTION EVERY 12 HOURS SCHEDULED
Status: CANCELLED | OUTPATIENT
Start: 2024-05-20

## 2024-05-29 ENCOUNTER — ANESTHESIA EVENT (OUTPATIENT)
Dept: ENDOSCOPY | Age: 54
End: 2024-05-29
Payer: COMMERCIAL

## 2024-05-29 NOTE — ANESTHESIA PRE PROCEDURE
(MACRODANTIN) 50 MG capsule Oral: 50 mg as a single dose taken within 2 hours of sexual intercourse 90 capsule 1    acetaminophen (TYLENOL) 500 MG tablet Take 1 tablet by mouth every 6 hours as needed for Pain      lidocaine (LMX) 4 % cream Apply a half dollar sized amount to intact skin topically up to twice daily as needed for pain 45 g 1    fluticasone (FLONASE) 50 MCG/ACT nasal spray 1 spray by Each Nostril route daily as needed      aspirin 81 MG tablet Take 1 tablet by mouth Takes 2 tablets daily         Allergies:    Allergies   Allergen Reactions    Amitriptyline      Couldn't tolerate it     Amoxicillin Hives    Nsaids Other (See Comments)     STROKE/NO NSAIDS ALLOWED    Pcn [Penicillins] Rash       Problem List:    Patient Active Problem List   Diagnosis Code    Full incontinence of feces R15.9    History of stroke Z86.73    Depression with anxiety F41.8    Hypothyroidism E03.9    Iron deficiency anemia D50.9    Prediabetes R73.03    Right hip impingement syndrome M25.851    Rosacea L71.9    Spondylosis of lumbar region without myelopathy or radiculopathy M47.816    Memory loss R41.3    Acute bacterial sinusitis J01.90, B96.89    Brain fog R41.89    Fibromyalgia M79.7    Attention deficit R41.840    Recurrent UTI N39.0    Mixed hyperlipidemia E78.2    Zenker's diverticulum K22.5    Esophageal diverticulum Q39.6       Past Medical History:        Diagnosis Date    Abnormal Pap smear of cervix     Allergic rhinitis     Cerebrovascular disease 2016    Chronic back pain 9/22/2019    Depression     Depression with anxiety     Depression with anxiety     Fibromyalgia 2019?    GERD (gastroesophageal reflux disease)     Headache 1987    Hyperthyroidism 1995?    Hypothyroidism 1994    Not sure of exact date    Iron deficiency anemia 04/12/2019    Irritable bowel syndrome     Osteoarthritis 1/2022    Stroke ([facility]) 05/2016    Idiopathic she is still in process     Thyroid disease        Past Surgical History:

## 2024-05-30 ENCOUNTER — ANESTHESIA (OUTPATIENT)
Dept: ENDOSCOPY | Age: 54
End: 2024-05-30
Payer: COMMERCIAL

## 2024-05-30 ENCOUNTER — HOSPITAL ENCOUNTER (OUTPATIENT)
Age: 54
Setting detail: OUTPATIENT SURGERY
Discharge: HOME | End: 2024-05-30
Attending: INTERNAL MEDICINE | Admitting: INTERNAL MEDICINE
Payer: COMMERCIAL

## 2024-05-30 VITALS
BODY MASS INDEX: 22.66 KG/M2 | WEIGHT: 136 LBS | HEART RATE: 57 BPM | HEIGHT: 65 IN | RESPIRATION RATE: 18 BRPM | OXYGEN SATURATION: 97 % | SYSTOLIC BLOOD PRESSURE: 126 MMHG | TEMPERATURE: 98.3 F | DIASTOLIC BLOOD PRESSURE: 58 MMHG

## 2024-05-30 DIAGNOSIS — Z12.11 COLON CANCER SCREENING: ICD-10-CM

## 2024-05-30 PROCEDURE — 45385 COLONOSCOPY W/LESION REMOVAL: CPT | Performed by: INTERNAL MEDICINE

## 2024-05-30 PROCEDURE — 7100000011 HC PHASE II RECOVERY - ADDTL 15 MIN: Performed by: INTERNAL MEDICINE

## 2024-05-30 PROCEDURE — 6360000002 HC RX W HCPCS: Performed by: REGISTERED NURSE

## 2024-05-30 PROCEDURE — 7100000010 HC PHASE II RECOVERY - FIRST 15 MIN: Performed by: INTERNAL MEDICINE

## 2024-05-30 PROCEDURE — 88305 TISSUE EXAM BY PATHOLOGIST: CPT

## 2024-05-30 PROCEDURE — 3609027000 HC COLONOSCOPY: Performed by: INTERNAL MEDICINE

## 2024-05-30 PROCEDURE — 3700000001 HC ADD 15 MINUTES (ANESTHESIA): Performed by: INTERNAL MEDICINE

## 2024-05-30 PROCEDURE — 6370000000 HC RX 637 (ALT 250 FOR IP): Performed by: INTERNAL MEDICINE

## 2024-05-30 PROCEDURE — 2500000003 HC RX 250 WO HCPCS: Performed by: REGISTERED NURSE

## 2024-05-30 PROCEDURE — 3700000000 HC ANESTHESIA ATTENDED CARE: Performed by: INTERNAL MEDICINE

## 2024-05-30 PROCEDURE — 2709999900 HC NON-CHARGEABLE SUPPLY: Performed by: INTERNAL MEDICINE

## 2024-05-30 PROCEDURE — 2580000003 HC RX 258: Performed by: INTERNAL MEDICINE

## 2024-05-30 RX ORDER — SODIUM CHLORIDE 0.9 % (FLUSH) 0.9 %
5-40 SYRINGE (ML) INJECTION EVERY 12 HOURS SCHEDULED
Status: DISCONTINUED | OUTPATIENT
Start: 2024-05-30 | End: 2024-05-30 | Stop reason: HOSPADM

## 2024-05-30 RX ORDER — SODIUM CHLORIDE 9 MG/ML
INJECTION, SOLUTION INTRAVENOUS
Status: DISCONTINUED
Start: 2024-05-30 | End: 2024-05-30 | Stop reason: HOSPADM

## 2024-05-30 RX ORDER — SODIUM CHLORIDE 9 MG/ML
INJECTION, SOLUTION INTRAVENOUS CONTINUOUS
Status: DISCONTINUED | OUTPATIENT
Start: 2024-05-30 | End: 2024-05-30 | Stop reason: HOSPADM

## 2024-05-30 RX ORDER — LIDOCAINE HYDROCHLORIDE 20 MG/ML
INJECTION, SOLUTION INFILTRATION; PERINEURAL PRN
Status: DISCONTINUED | OUTPATIENT
Start: 2024-05-30 | End: 2024-05-30 | Stop reason: SDUPTHER

## 2024-05-30 RX ORDER — SODIUM CHLORIDE 9 MG/ML
INJECTION, SOLUTION INTRAVENOUS PRN
Status: DISCONTINUED | OUTPATIENT
Start: 2024-05-30 | End: 2024-05-30 | Stop reason: HOSPADM

## 2024-05-30 RX ORDER — PROPOFOL 10 MG/ML
INJECTION, EMULSION INTRAVENOUS PRN
Status: DISCONTINUED | OUTPATIENT
Start: 2024-05-30 | End: 2024-05-30 | Stop reason: SDUPTHER

## 2024-05-30 RX ORDER — M-VIT,TX,IRON,MINS/CALC/FOLIC 27MG-0.4MG
1 TABLET ORAL DAILY
COMMUNITY

## 2024-05-30 RX ORDER — SIMETHICONE 40MG/0.6ML
SUSPENSION, DROPS(FINAL DOSAGE FORM)(ML) ORAL PRN
Status: DISCONTINUED | OUTPATIENT
Start: 2024-05-30 | End: 2024-05-30 | Stop reason: ALTCHOICE

## 2024-05-30 RX ORDER — SODIUM CHLORIDE 0.9 % (FLUSH) 0.9 %
5-40 SYRINGE (ML) INJECTION PRN
Status: DISCONTINUED | OUTPATIENT
Start: 2024-05-30 | End: 2024-05-30 | Stop reason: HOSPADM

## 2024-05-30 RX ADMIN — LIDOCAINE HYDROCHLORIDE 60 MG: 20 INJECTION, SOLUTION INFILTRATION; PERINEURAL at 08:58

## 2024-05-30 RX ADMIN — PROPOFOL 50 MG: 10 INJECTION, EMULSION INTRAVENOUS at 09:08

## 2024-05-30 RX ADMIN — PROPOFOL 50 MG: 10 INJECTION, EMULSION INTRAVENOUS at 09:15

## 2024-05-30 RX ADMIN — PROPOFOL 30 MG: 10 INJECTION, EMULSION INTRAVENOUS at 09:03

## 2024-05-30 RX ADMIN — PROPOFOL 150 MG: 10 INJECTION, EMULSION INTRAVENOUS at 08:57

## 2024-05-30 RX ADMIN — SODIUM CHLORIDE: 9 INJECTION, SOLUTION INTRAVENOUS at 08:41

## 2024-05-30 RX ADMIN — PROPOFOL 50 MG: 10 INJECTION, EMULSION INTRAVENOUS at 09:18

## 2024-05-30 RX ADMIN — PROPOFOL 50 MG: 10 INJECTION, EMULSION INTRAVENOUS at 09:11

## 2024-05-30 RX ADMIN — PROPOFOL 50 MG: 10 INJECTION, EMULSION INTRAVENOUS at 09:06

## 2024-05-30 RX ADMIN — PROPOFOL 50 MG: 10 INJECTION, EMULSION INTRAVENOUS at 08:59

## 2024-05-30 ASSESSMENT — PAIN - FUNCTIONAL ASSESSMENT
PAIN_FUNCTIONAL_ASSESSMENT: 0-10
PAIN_FUNCTIONAL_ASSESSMENT: 0-10

## 2024-05-30 NOTE — H&P
Patient Name: Shawna Stanley  : 1970  MRN: [id number]  DATE: 24      ENDOSCOPY  History and Physical    Procedure:    [] Diagnostic Colonoscopy       [x] Screening Colonoscopy  [] EGD      [] ERCP      [] EUS       [] Other    [x] Previous office notes/History and Physical reviewed from the patients chart. Please see EMR for further details of HPI. I have examined the patient's status immediately prior to the procedure and:      Indications/HPI:    []Abdominal Pain   []Cancer- GI/Lung  []Fhx of colon CA  []History of Polyps   []Herrera’s   []Melena  []Abnormal Imaging   []Dysphagia    []Persistent Pneumonia  []Anemia   []Food Impaction  []History of Polyps  []GI Bleed   []Pulmonary nodule/Mass  []Change in bowel habits  []Heartburn/Reflux  []Rectal Bleed (BRBPR)  []Chest Pain - Non Cardiac  []Heme (+) Stool  []Ulcers  []Constipation   []Hemoptysis   []Varices  []Diarrhea   []Hypoxemia  []Nausea/Vomiting   [x]Screening   []Crohns/Colitis  []Other:    Anesthesia:   [x] MAC [] Moderate Sedation   [] General   [] None     ROS: 12 pt Review of Symptoms was negative unless mentioned above    Medications:   Prior to Admission medications    Medication Sig Start Date End Date Taking? Authorizing Provider   Multiple Vitamins-Minerals (THERAPEUTIC MULTIVITAMIN-MINERALS) tablet Take 1 tablet by mouth daily   Yes Provider, MD Nelly   levothyroxine (SYNTHROID) 100 MCG tablet Take 1 tablet by mouth daily 3/1/24   Maximilian Alcantar MD   atorvastatin (LIPITOR) 40 MG tablet Take 1 tablet by mouth daily 23  Octavia Liriano DO   Handicap Placard MISC by Does not apply route For medical condition lasting longer than 5 years.  Start 2023  End 2028   Octavia Liriano DO   nitrofurantoin (MACRODANTIN) 50 MG capsule Oral: 50 mg as a single dose taken within 2 hours of sexual intercourse 4/3/23   Octavia Liriano DO   acetaminophen (TYLENOL) 500 MG tablet Take 1 tablet by mouth every

## 2024-05-30 NOTE — ANESTHESIA POSTPROCEDURE EVALUATION
Department of Anesthesiology  Postprocedure Note    Patient: Shawna Stanley  MRN: [id number]  YOB: 1970  Date of evaluation: 5/30/2024    Procedure Summary       Date: 05/30/24 Room / Location: [facility] OR 02 / [facility]    Anesthesia Start: 0853 Anesthesia Stop: 0926    Procedure: COLORECTAL CANCER SCREENING, NOT HIGH RISK with polypectomy Diagnosis:       Colon cancer screening      (Colon cancer screening [Z12.11])    Surgeons: Bill Roman MD Responsible Provider: Atul Miranda APRN - CRNA    Anesthesia Type: MAC ASA Status: 3            Anesthesia Type: No value filed.    Christophe Phase I: Christophe Score: 10    Christophe Phase II: Christophe Score: 7    Anesthesia Post Evaluation    Patient location during evaluation: bedside  Patient participation: complete - patient participated  Level of consciousness: awake and awake and alert  Airway patency: patent  Nausea & Vomiting: no nausea and no vomiting  Cardiovascular status: blood pressure returned to baseline and hemodynamically stable  Respiratory status: acceptable  Hydration status: euvolemic  Pain management: adequate        No notable events documented.

## 2024-06-20 DIAGNOSIS — E03.9 HYPOTHYROIDISM, UNSPECIFIED TYPE: ICD-10-CM

## 2024-06-20 RX ORDER — LEVOTHYROXINE SODIUM 0.1 MG/1
100 TABLET ORAL DAILY
Qty: 90 TABLET | Refills: 0 | Status: SHIPPED | OUTPATIENT
Start: 2024-06-20

## 2024-07-01 DIAGNOSIS — E78.2 MIXED HYPERLIPIDEMIA: ICD-10-CM

## 2024-07-02 RX ORDER — ATORVASTATIN CALCIUM 40 MG/1
40 TABLET, FILM COATED ORAL DAILY
Qty: 90 TABLET | Refills: 3 | Status: SHIPPED | OUTPATIENT
Start: 2024-07-02 | End: 2024-12-29

## 2024-07-09 ENCOUNTER — OFFICE VISIT (OUTPATIENT)
Dept: FAMILY MEDICINE CLINIC | Age: 54
End: 2024-07-09
Payer: COMMERCIAL

## 2024-07-09 VITALS
TEMPERATURE: 98.3 F | BODY MASS INDEX: 22.82 KG/M2 | SYSTOLIC BLOOD PRESSURE: 90 MMHG | WEIGHT: 137 LBS | HEIGHT: 65 IN | OXYGEN SATURATION: 99 % | HEART RATE: 75 BPM | DIASTOLIC BLOOD PRESSURE: 60 MMHG

## 2024-07-09 DIAGNOSIS — R73.03 PREDIABETES: ICD-10-CM

## 2024-07-09 DIAGNOSIS — F41.9 ANXIETY: ICD-10-CM

## 2024-07-09 DIAGNOSIS — K58.0 IRRITABLE BOWEL SYNDROME WITH DIARRHEA: ICD-10-CM

## 2024-07-09 DIAGNOSIS — M79.7 FIBROMYALGIA: ICD-10-CM

## 2024-07-09 DIAGNOSIS — E03.9 HYPOTHYROIDISM, UNSPECIFIED TYPE: Primary | ICD-10-CM

## 2024-07-09 DIAGNOSIS — G47.00 INSOMNIA, UNSPECIFIED TYPE: ICD-10-CM

## 2024-07-09 DIAGNOSIS — E78.2 MIXED HYPERLIPIDEMIA: ICD-10-CM

## 2024-07-09 PROCEDURE — 99214 OFFICE O/P EST MOD 30 MIN: CPT | Performed by: STUDENT IN AN ORGANIZED HEALTH CARE EDUCATION/TRAINING PROGRAM

## 2024-07-09 RX ORDER — ESCITALOPRAM OXALATE 5 MG/1
5 TABLET ORAL DAILY
Qty: 90 TABLET | Refills: 1 | Status: SHIPPED | OUTPATIENT
Start: 2024-07-09 | End: 2024-10-07

## 2024-07-09 RX ORDER — DICYCLOMINE HYDROCHLORIDE 10 MG/1
10 CAPSULE ORAL
Qty: 120 CAPSULE | Refills: 3 | Status: SHIPPED | OUTPATIENT
Start: 2024-07-09

## 2024-07-09 RX ORDER — LEVOTHYROXINE SODIUM 0.1 MG/1
100 TABLET ORAL DAILY
Qty: 90 TABLET | Refills: 2 | Status: SHIPPED | OUTPATIENT
Start: 2024-07-09

## 2024-07-09 RX ORDER — DOXYCYCLINE HYCLATE 100 MG/1
100 CAPSULE ORAL 2 TIMES DAILY
COMMUNITY
Start: 2024-07-06 | End: 2024-07-11

## 2024-07-09 NOTE — PROGRESS NOTES
tenderness.   Cardiovascular:      Rate and Rhythm: Normal rate and regular rhythm.      Heart sounds: No murmur heard.     No friction rub. No gallop.   Pulmonary:      Effort: Pulmonary effort is normal.      Breath sounds: Normal breath sounds. No wheezing, rhonchi or rales.   Musculoskeletal:         General: No swelling or deformity.      Cervical back: Normal range of motion and neck supple.      Right lower leg: No edema.      Left lower leg: No edema.   Lymphadenopathy:      Cervical: No cervical adenopathy.   Skin:     General: Skin is warm and dry.      Findings: No rash.   Neurological:      General: No focal deficit present.      Mental Status: She is alert.      Gait: Gait is intact.   Psychiatric:         Mood and Affect: Mood normal.         Behavior: Behavior normal.            Assessment & Plan       1. Hypothyroidism, unspecified type  Stable, chronic.  Continue levothyroxine 100 mcg daily.  Refill sent.  Continue to monitor.  Follow-up as scheduled.  - levothyroxine (SYNTHROID) 100 MCG tablet; Take 1 tablet by mouth daily  Dispense: 90 tablet; Refill: 2    2. Anxiety  Not currently controlled.  Discussed options.  Interested in starting medication.  Will plan to start Lexapro 5 mg daily.  Refill sent.  Follow-up in 4 to 6 weeks.  Continue to monitor.  Follow-up as scheduled.  - escitalopram (LEXAPRO) 5 MG tablet; Take 1 tablet by mouth daily  Dispense: 90 tablet; Refill: 1    3. Irritable bowel syndrome with diarrhea  Stable, chronic.  Continue Bentyl as needed.  Refill sent.  Continue to monitor.  Follow-up as scheduled.  - dicyclomine (BENTYL) 10 MG capsule; Take 1 capsule by mouth 4 times daily (before meals and nightly)  Dispense: 120 capsule; Refill: 3    4. Mixed hyperlipidemia  Stable, chronic.  Continue atorvastatin 40 mg daily.  No refill needed.  Continue to monitor.  Follow-up as scheduled.    5. Prediabetes  Stable, chronic.  Last A1c normal.  Not currently on meds.  Continue healthy

## 2024-07-11 ASSESSMENT — ENCOUNTER SYMPTOMS
DIARRHEA: 1
NAUSEA: 0
WHEEZING: 0
COUGH: 0
RHINORRHEA: 0
SHORTNESS OF BREATH: 0
ABDOMINAL PAIN: 0
EYE DISCHARGE: 0
SORE THROAT: 0
VOMITING: 0

## 2024-08-20 ENCOUNTER — OFFICE VISIT (OUTPATIENT)
Dept: FAMILY MEDICINE CLINIC | Age: 54
End: 2024-08-20
Payer: COMMERCIAL

## 2024-08-20 VITALS
DIASTOLIC BLOOD PRESSURE: 70 MMHG | HEART RATE: 59 BPM | OXYGEN SATURATION: 100 % | HEIGHT: 65 IN | TEMPERATURE: 97.8 F | BODY MASS INDEX: 22.99 KG/M2 | SYSTOLIC BLOOD PRESSURE: 110 MMHG | WEIGHT: 138 LBS

## 2024-08-20 DIAGNOSIS — F41.9 ANXIETY: Primary | ICD-10-CM

## 2024-08-20 DIAGNOSIS — F32.0 CURRENT MILD EPISODE OF MAJOR DEPRESSIVE DISORDER, UNSPECIFIED WHETHER RECURRENT (HCC): ICD-10-CM

## 2024-08-20 DIAGNOSIS — E03.9 HYPOTHYROIDISM, UNSPECIFIED TYPE: ICD-10-CM

## 2024-08-20 PROCEDURE — 99215 OFFICE O/P EST HI 40 MIN: CPT | Performed by: STUDENT IN AN ORGANIZED HEALTH CARE EDUCATION/TRAINING PROGRAM

## 2024-08-20 RX ORDER — ESCITALOPRAM OXALATE 10 MG/1
10 TABLET ORAL DAILY
Qty: 90 TABLET | Refills: 1 | Status: SHIPPED | OUTPATIENT
Start: 2024-08-20

## 2024-08-21 ASSESSMENT — ENCOUNTER SYMPTOMS
WHEEZING: 0
EYE DISCHARGE: 0
ABDOMINAL PAIN: 0
VOMITING: 0
NAUSEA: 0
COUGH: 0
DIARRHEA: 0
SORE THROAT: 0
SHORTNESS OF BREATH: 0
RHINORRHEA: 0

## 2024-09-09 DIAGNOSIS — E03.9 HYPOTHYROIDISM, UNSPECIFIED TYPE: ICD-10-CM

## 2024-09-09 RX ORDER — LEVOTHYROXINE SODIUM 100 UG/1
100 TABLET ORAL DAILY
Qty: 90 TABLET | Refills: 2 | Status: SHIPPED | OUTPATIENT
Start: 2024-09-09

## 2024-10-01 ENCOUNTER — OFFICE VISIT (OUTPATIENT)
Dept: FAMILY MEDICINE CLINIC | Age: 54
End: 2024-10-01
Payer: COMMERCIAL

## 2024-10-01 VITALS
WEIGHT: 140 LBS | BODY MASS INDEX: 23.32 KG/M2 | HEART RATE: 62 BPM | HEIGHT: 65 IN | TEMPERATURE: 98 F | SYSTOLIC BLOOD PRESSURE: 122 MMHG | DIASTOLIC BLOOD PRESSURE: 80 MMHG | OXYGEN SATURATION: 98 %

## 2024-10-01 DIAGNOSIS — B96.89 ACUTE BACTERIAL SINUSITIS: ICD-10-CM

## 2024-10-01 DIAGNOSIS — J01.90 ACUTE BACTERIAL SINUSITIS: ICD-10-CM

## 2024-10-01 DIAGNOSIS — E78.2 MIXED HYPERLIPIDEMIA: Primary | ICD-10-CM

## 2024-10-01 DIAGNOSIS — F41.9 ANXIETY: ICD-10-CM

## 2024-10-01 DIAGNOSIS — F32.0 CURRENT MILD EPISODE OF MAJOR DEPRESSIVE DISORDER, UNSPECIFIED WHETHER RECURRENT (HCC): ICD-10-CM

## 2024-10-01 DIAGNOSIS — N39.0 RECURRENT UTI: ICD-10-CM

## 2024-10-01 DIAGNOSIS — E03.9 HYPOTHYROIDISM, UNSPECIFIED TYPE: ICD-10-CM

## 2024-10-01 LAB
BILIRUBIN, POC: NEGATIVE
BLOOD URINE, POC: ABNORMAL
CLARITY, POC: CLEAR
COLOR, POC: YELLOW
GLUCOSE URINE, POC: NEGATIVE MG/DL
KETONES, POC: NEGATIVE MG/DL
LEUKOCYTE EST, POC: NEGATIVE
NITRITE, POC: NEGATIVE
PH, POC: 5.5
PROTEIN, POC: NEGATIVE MG/DL
SPECIFIC GRAVITY, POC: 1.01
UROBILINOGEN, POC: 0.2 MG/DL

## 2024-10-01 PROCEDURE — 99214 OFFICE O/P EST MOD 30 MIN: CPT | Performed by: STUDENT IN AN ORGANIZED HEALTH CARE EDUCATION/TRAINING PROGRAM

## 2024-10-01 PROCEDURE — 81003 URINALYSIS AUTO W/O SCOPE: CPT | Performed by: STUDENT IN AN ORGANIZED HEALTH CARE EDUCATION/TRAINING PROGRAM

## 2024-10-01 RX ORDER — DOXYCYCLINE HYCLATE 100 MG
100 TABLET ORAL 2 TIMES DAILY
Qty: 20 TABLET | Refills: 0 | Status: SHIPPED | OUTPATIENT
Start: 2024-10-01 | End: 2024-10-11

## 2024-10-01 NOTE — PROGRESS NOTES
Subjective  Shawna Stanley, 54 y.o. female presents today with:  Chief Complaint   Patient presents with    Follow-up    Depression     Feels mood has improved. Does not really feel like she is feeling down & depressed, but is having trouble sleeping. States she is sleeping maybe 4-5 hrs a night. States she is able to fall asleep, but not able to stay asleep.     Anxiety     Feels this has improved & is controlled at this time.     Pre-Diabetes     A1c was 5.4 on 11/16/23. Does not check blood sugar at home.     Hypothyroidism     Labs done 1/3/24. Taking medication as directed, no side effects.     Hyperlipidemia     Lipid panel done 3/19/24.     Dysuria     For the past weeks has been having burning with urination, urinary frequency & urgency. Has not taken any OTC meds.     Nasal Congestion     States she thinks she has a sinus infection. For the past 2 weeks she has been having a stuffy nose & when blowing nose will bleed. Is having PND & a scratchy throat, ears are bothering her & having light sensitivity. Has taken OTC allergy meds with no relief.        Patient with routine follow-up appointment.    Patient with mixed hyperlipidemia.  She is on atorvastatin 40 mg daily.  Tolerating well.  No side effects from medication.  No muscle aches or pains.    Patient also with major depressive disorder and anxiety.  She does feel like her mood is improved.  She is not feeling as anxious either.  She is doing well on the Lexapro 10 mg daily.  No side effects from the medication.  No suicidal or homicidal ideation.  She does continue to have some difficulty sleeping but is working through it.    Patient is with recurrent UTI.  Using Macrobid as needed for this.  Working well.  She does report some burning with urination, urinary frequency and urgency.  Has not used anything to help with this.  Has been going on for about a week.  No fevers or chills.  No back pain.    Patient also with hypothyroidism.  She is

## 2024-10-02 ASSESSMENT — ENCOUNTER SYMPTOMS
SORE THROAT: 0
COUGH: 1
NAUSEA: 0
VOMITING: 0
EYE DISCHARGE: 0
WHEEZING: 0
DIARRHEA: 0
SINUS PRESSURE: 1
ABDOMINAL PAIN: 0
SHORTNESS OF BREATH: 0

## 2024-11-08 ENCOUNTER — OFFICE VISIT (OUTPATIENT)
Dept: FAMILY MEDICINE CLINIC | Age: 54
End: 2024-11-08

## 2024-11-08 VITALS
OXYGEN SATURATION: 98 % | WEIGHT: 138 LBS | TEMPERATURE: 98.2 F | HEIGHT: 65 IN | SYSTOLIC BLOOD PRESSURE: 124 MMHG | DIASTOLIC BLOOD PRESSURE: 86 MMHG | HEART RATE: 68 BPM | BODY MASS INDEX: 22.99 KG/M2

## 2024-11-08 DIAGNOSIS — F32.0 CURRENT MILD EPISODE OF MAJOR DEPRESSIVE DISORDER, UNSPECIFIED WHETHER RECURRENT (HCC): ICD-10-CM

## 2024-11-08 DIAGNOSIS — L91.8 SKIN TAG: ICD-10-CM

## 2024-11-08 DIAGNOSIS — M77.02 MEDIAL EPICONDYLITIS OF LEFT ELBOW: Primary | ICD-10-CM

## 2024-11-08 DIAGNOSIS — J06.9 VIRAL URI: ICD-10-CM

## 2024-11-08 DIAGNOSIS — F41.9 ANXIETY: ICD-10-CM

## 2024-11-08 RX ORDER — DOXYCYCLINE HYCLATE 100 MG
100 TABLET ORAL 2 TIMES DAILY
COMMUNITY

## 2024-11-08 NOTE — PROGRESS NOTES
Subjective  Shawna Stanley, 54 y.o. female presents today with:  Chief Complaint   Patient presents with    Elbow Pain     Has been having left elbow pain for the past month or more. States she thinks she did hit it on something at some point, but doesn't seem to be getting better. Denies swelling. Has not used heat or ice to area. Has not used pain relievers.     Rash     States she has a rash on right thigh area, on underwear line. Rash does not itch. Has been present for 6 months or more. Feels it continues to get worse. Has not used any OTC products to the area.     Congestion     Was seen at  11/1/24. Started on Doxycycline. States she continues to not feel well. Denies any fevers. Has nasal congestion & PND. Has a cough, occasionally productive, light yellow in color. Denies SOB or wheezing. States since not feeling well she has also been feeling like her heart is racing/fluttering.        History of Present Illness  The patient is a 54-year-old female who presents for a follow-up.    She reports persistent pain in her left elbow, which she believes may have been caused by an impact. The pain has been present for approximately 1.5 to 2 months and has been quite intense, although it is not severe today. She describes the pain as radiating around the area and notes that the bone appears more prominent. She suspects that she may have developed a cyst due to the impact. She has not been taking any anti-inflammatory medications and does not believe that swinging her arm exacerbates the pain. Additionally, she mentions that she tends to hold her granddaughter more on her left side.    She has noticed a few spots near her panty line that have been present for some time. These spots do not itch or cause discomfort, but she is unsure of their nature. She has observed a few more spots appearing recently. There is no drainage from these spots, and she does not believe they are warts, as she has had warts before and

## 2024-11-19 ENCOUNTER — TELEPHONE (OUTPATIENT)
Dept: FAMILY MEDICINE CLINIC | Age: 54
End: 2024-11-19

## 2024-11-19 NOTE — TELEPHONE ENCOUNTER
Called patient to schedule a Welcome to Medicare-AW.  Just need to verify that patient got Medicare within the last year.

## 2025-01-04 SDOH — HEALTH STABILITY: PHYSICAL HEALTH
ON AVERAGE, HOW MANY DAYS PER WEEK DO YOU ENGAGE IN MODERATE TO STRENUOUS EXERCISE (LIKE A BRISK WALK)?: PATIENT DECLINED

## 2025-01-04 ASSESSMENT — LIFESTYLE VARIABLES
HOW MANY STANDARD DRINKS CONTAINING ALCOHOL DO YOU HAVE ON A TYPICAL DAY: PATIENT DOES NOT DRINK
HOW OFTEN DO YOU HAVE A DRINK CONTAINING ALCOHOL: MONTHLY OR LESS
HOW OFTEN DO YOU HAVE SIX OR MORE DRINKS ON ONE OCCASION: 1
HOW MANY STANDARD DRINKS CONTAINING ALCOHOL DO YOU HAVE ON A TYPICAL DAY: 0
HOW OFTEN DO YOU HAVE A DRINK CONTAINING ALCOHOL: 2

## 2025-01-04 ASSESSMENT — PATIENT HEALTH QUESTIONNAIRE - PHQ9
SUM OF ALL RESPONSES TO PHQ QUESTIONS 1-9: 0
1. LITTLE INTEREST OR PLEASURE IN DOING THINGS: NOT AT ALL
8. MOVING OR SPEAKING SO SLOWLY THAT OTHER PEOPLE COULD HAVE NOTICED. OR THE OPPOSITE - BEING SO FIDGETY OR RESTLESS THAT YOU HAVE BEEN MOVING AROUND A LOT MORE THAN USUAL: NOT AT ALL
4. FEELING TIRED OR HAVING LITTLE ENERGY: SEVERAL DAYS
6. FEELING BAD ABOUT YOURSELF - OR THAT YOU ARE A FAILURE OR HAVE LET YOURSELF OR YOUR FAMILY DOWN: NOT AT ALL
SUM OF ALL RESPONSES TO PHQ QUESTIONS 1-9: 1
5. POOR APPETITE OR OVEREATING: NOT AT ALL
SUM OF ALL RESPONSES TO PHQ QUESTIONS 1-9: 1
8. MOVING OR SPEAKING SO SLOWLY THAT OTHER PEOPLE COULD HAVE NOTICED. OR THE OPPOSITE, BEING SO FIGETY OR RESTLESS THAT YOU HAVE BEEN MOVING AROUND A LOT MORE THAN USUAL: NOT AT ALL
2. FEELING DOWN, DEPRESSED OR HOPELESS: NOT AT ALL
SUM OF ALL RESPONSES TO PHQ QUESTIONS 1-9: 1
SUM OF ALL RESPONSES TO PHQ9 QUESTIONS 1 & 2: 0
10. IF YOU CHECKED OFF ANY PROBLEMS, HOW DIFFICULT HAVE THESE PROBLEMS MADE IT FOR YOU TO DO YOUR WORK, TAKE CARE OF THINGS AT HOME, OR GET ALONG WITH OTHER PEOPLE: NOT DIFFICULT AT ALL
1. LITTLE INTEREST OR PLEASURE IN DOING THINGS: NOT AT ALL
7. TROUBLE CONCENTRATING ON THINGS, SUCH AS READING THE NEWSPAPER OR WATCHING TELEVISION: NOT AT ALL
5. POOR APPETITE OR OVEREATING: NOT AT ALL
SUM OF ALL RESPONSES TO PHQ QUESTIONS 1-9: 0
6. FEELING BAD ABOUT YOURSELF - OR THAT YOU ARE A FAILURE OR HAVE LET YOURSELF OR YOUR FAMILY DOWN: NOT AT ALL
SUM OF ALL RESPONSES TO PHQ QUESTIONS 1-9: 0
3. TROUBLE FALLING OR STAYING ASLEEP: NOT AT ALL
SUM OF ALL RESPONSES TO PHQ QUESTIONS 1-9: 0
3. TROUBLE FALLING OR STAYING ASLEEP: NOT AT ALL
1. LITTLE INTEREST OR PLEASURE IN DOING THINGS: NOT AT ALL
7. TROUBLE CONCENTRATING ON THINGS, SUCH AS READING THE NEWSPAPER OR WATCHING TELEVISION: NOT AT ALL
SUM OF ALL RESPONSES TO PHQ QUESTIONS 1-9: 1
9. THOUGHTS THAT YOU WOULD BE BETTER OFF DEAD, OR OF HURTING YOURSELF: NOT AT ALL
SUM OF ALL RESPONSES TO PHQ9 QUESTIONS 1 & 2: 0
4. FEELING TIRED OR HAVING LITTLE ENERGY: SEVERAL DAYS
9. THOUGHTS THAT YOU WOULD BE BETTER OFF DEAD, OR OF HURTING YOURSELF: NOT AT ALL
2. FEELING DOWN, DEPRESSED OR HOPELESS: NOT AT ALL
SUM OF ALL RESPONSES TO PHQ QUESTIONS 1-9: 1
2. FEELING DOWN, DEPRESSED OR HOPELESS: NOT AT ALL
10. IF YOU CHECKED OFF ANY PROBLEMS, HOW DIFFICULT HAVE THESE PROBLEMS MADE IT FOR YOU TO DO YOUR WORK, TAKE CARE OF THINGS AT HOME, OR GET ALONG WITH OTHER PEOPLE: NOT DIFFICULT AT ALL

## 2025-01-07 ENCOUNTER — OFFICE VISIT (OUTPATIENT)
Age: 55
End: 2025-01-07
Payer: MEDICARE

## 2025-01-07 VITALS
OXYGEN SATURATION: 96 % | WEIGHT: 138 LBS | HEIGHT: 65 IN | HEART RATE: 70 BPM | DIASTOLIC BLOOD PRESSURE: 68 MMHG | BODY MASS INDEX: 22.99 KG/M2 | TEMPERATURE: 98.7 F | SYSTOLIC BLOOD PRESSURE: 100 MMHG

## 2025-01-07 VITALS
HEIGHT: 65 IN | HEART RATE: 70 BPM | WEIGHT: 138 LBS | SYSTOLIC BLOOD PRESSURE: 100 MMHG | DIASTOLIC BLOOD PRESSURE: 68 MMHG | BODY MASS INDEX: 22.99 KG/M2 | OXYGEN SATURATION: 96 % | TEMPERATURE: 98.7 F

## 2025-01-07 DIAGNOSIS — Z00.00 WELCOME TO MEDICARE PREVENTIVE VISIT: Primary | ICD-10-CM

## 2025-01-07 DIAGNOSIS — E78.2 MIXED HYPERLIPIDEMIA: ICD-10-CM

## 2025-01-07 DIAGNOSIS — Z81.8 FAMILY HISTORY OF DEMENTIA: ICD-10-CM

## 2025-01-07 DIAGNOSIS — E03.9 HYPOTHYROIDISM, UNSPECIFIED TYPE: ICD-10-CM

## 2025-01-07 DIAGNOSIS — R73.03 PREDIABETES: ICD-10-CM

## 2025-01-07 DIAGNOSIS — J06.9 VIRAL URI: ICD-10-CM

## 2025-01-07 DIAGNOSIS — F41.9 ANXIETY: Primary | ICD-10-CM

## 2025-01-07 DIAGNOSIS — F32.0 CURRENT MILD EPISODE OF MAJOR DEPRESSIVE DISORDER, UNSPECIFIED WHETHER RECURRENT (HCC): ICD-10-CM

## 2025-01-07 PROCEDURE — G8420 CALC BMI NORM PARAMETERS: HCPCS | Performed by: STUDENT IN AN ORGANIZED HEALTH CARE EDUCATION/TRAINING PROGRAM

## 2025-01-07 PROCEDURE — M1308 PR FLU IMMUNIZE NO ADMIN: HCPCS | Performed by: STUDENT IN AN ORGANIZED HEALTH CARE EDUCATION/TRAINING PROGRAM

## 2025-01-07 PROCEDURE — G8427 DOCREV CUR MEDS BY ELIG CLIN: HCPCS | Performed by: STUDENT IN AN ORGANIZED HEALTH CARE EDUCATION/TRAINING PROGRAM

## 2025-01-07 PROCEDURE — G0402 INITIAL PREVENTIVE EXAM: HCPCS | Performed by: STUDENT IN AN ORGANIZED HEALTH CARE EDUCATION/TRAINING PROGRAM

## 2025-01-07 PROCEDURE — 99213 OFFICE O/P EST LOW 20 MIN: CPT | Performed by: STUDENT IN AN ORGANIZED HEALTH CARE EDUCATION/TRAINING PROGRAM

## 2025-01-07 PROCEDURE — 3017F COLORECTAL CA SCREEN DOC REV: CPT | Performed by: STUDENT IN AN ORGANIZED HEALTH CARE EDUCATION/TRAINING PROGRAM

## 2025-01-07 PROCEDURE — 1036F TOBACCO NON-USER: CPT | Performed by: STUDENT IN AN ORGANIZED HEALTH CARE EDUCATION/TRAINING PROGRAM

## 2025-01-07 PROCEDURE — 99214 OFFICE O/P EST MOD 30 MIN: CPT | Performed by: STUDENT IN AN ORGANIZED HEALTH CARE EDUCATION/TRAINING PROGRAM

## 2025-01-07 RX ORDER — ESCITALOPRAM OXALATE 10 MG/1
10 TABLET ORAL DAILY
Qty: 90 TABLET | Refills: 1 | Status: SHIPPED | OUTPATIENT
Start: 2025-01-07

## 2025-01-07 RX ORDER — ACETAMINOPHEN 160 MG
2000 TABLET,DISINTEGRATING ORAL DAILY
COMMUNITY

## 2025-01-07 ASSESSMENT — VISUAL ACUITY
OS_CC: 20/25
OD_CC: 20/20

## 2025-01-07 NOTE — PATIENT INSTRUCTIONS
have a chance of having heart disease. A heart-healthy lifestyle can help keep your heart healthy and prevent heart disease. This lifestyle includes eating healthy, being active, staying at a weight that's healthy for you, and not smoking or using tobacco. It also includes taking medicines as directed, managing other health conditions, and trying to get a healthy amount of sleep.  Follow-up care is a key part of your treatment and safety. Be sure to make and go to all appointments, and call your doctor if you are having problems. It's also a good idea to know your test results and keep a list of the medicines you take.  How can you care for yourself at home?  Diet    Use less salt when you cook and eat. This helps lower your blood pressure. Taste food before salting. Add only a little salt when you think you need it. With time, your taste buds will adjust to less salt.     Eat fewer snack items, fast foods, canned soups, and other high-salt, high-fat, processed foods.     Read food labels and try to avoid saturated and trans fats. They increase your risk of heart disease by raising cholesterol levels.     Limit the amount of solid fat--butter, margarine, and shortening--you eat. Use olive, peanut, or canola oil when you cook. Bake, broil, and steam foods instead of frying them.     Eat a variety of fruit and vegetables every day. Dark green, deep orange, red, or yellow fruits and vegetables are especially good for you. Examples include spinach, carrots, peaches, and berries.     Foods high in fiber can reduce your cholesterol and provide important vitamins and minerals. High-fiber foods include whole-grain cereals and breads, oatmeal, beans, brown rice, citrus fruits, and apples.     Eat lean proteins. Heart-healthy proteins include seafood, lean meats and poultry, eggs, beans, peas, nuts, seeds, and soy products.     Limit drinks and foods with added sugar. These include candy, desserts, and soda pop.

## 2025-01-07 NOTE — PROGRESS NOTES
capsule Take 1 capsule by mouth daily      escitalopram (LEXAPRO) 10 MG tablet Take 1 tablet by mouth daily 90 tablet 1    levothyroxine (SYNTHROID) 100 MCG tablet Take 1 tablet by mouth daily 90 tablet 2    dicyclomine (BENTYL) 10 MG capsule Take 1 capsule by mouth 4 times daily (before meals and nightly) 120 capsule 3    atorvastatin (LIPITOR) 40 MG tablet Take 1 tablet by mouth daily 90 tablet 3    Multiple Vitamins-Minerals (THERAPEUTIC MULTIVITAMIN-MINERALS) tablet Take 1 tablet by mouth daily      Handicap Placard MISC by Does not apply route For medical condition lasting longer than 5 years.  Start 4/14/2023  End 4/14/2028 1 each 0    acetaminophen (TYLENOL) 500 MG tablet Take 1 tablet by mouth every 6 hours as needed for Pain      lidocaine (LMX) 4 % cream Apply a half dollar sized amount to intact skin topically up to twice daily as needed for pain 45 g 1    fluticasone (FLONASE) 50 MCG/ACT nasal spray 1 spray by Each Nostril route daily as needed      aspirin 81 MG tablet Take 1 tablet by mouth Takes 2 tablets daily      nitrofurantoin (MACRODANTIN) 50 MG capsule Oral: 50 mg as a single dose taken within 2 hours of sexual intercourse (Patient not taking: Reported on 11/8/2024) 90 capsule 1     No current facility-administered medications for this visit.     Allergies, PMH, Surgical Hx, Family Hx, and Social Hx reviewed and updated.  Health Maintenance reviewed.    Objective    Vitals:    01/07/25 0758   BP: 100/68   Pulse: 70   Temp: 98.7 °F (37.1 °C)   SpO2: 96%   Weight: 62.6 kg (138 lb)   Height: 1.651 m (5' 5\")       Physical Exam  Vitals reviewed.   Constitutional:       General: She is not in acute distress.  HENT:      Head: Normocephalic and atraumatic.      Nose: Congestion present.      Mouth/Throat:      Mouth: Mucous membranes are moist.      Pharynx: Oropharynx is clear. Posterior oropharyngeal erythema present.   Eyes:      Conjunctiva/sclera: Conjunctivae normal.   Neck:      Thyroid: No

## 2025-01-07 NOTE — PROGRESS NOTES
Medicare Annual Wellness Visit    Shawna Stanley is here for Medicare AWV    Assessment & Plan   Welcome to Medicare preventive visit       No follow-ups on file.     Subjective       Patient's complete Health Risk Assessment and screening values have been reviewed and are found in Flowsheets. The following problems were reviewed today and where indicated follow up appointments were made and/or referrals ordered.    Positive Risk Factor Screenings with Interventions:                     ADL's:   Patient reports needing help with:  Select all that apply: (!) Laundry  Interventions:  Patient declined any further interventions or treatment    Advanced Directives:  Do you have a Living Will?: (!) No    Intervention:  has NO advanced directive - not interested in additional information                     Objective   Vitals:    01/07/25 0810   BP: 100/68   Pulse: 70   Temp: 98.7 °F (37.1 °C)   SpO2: 96%   Weight: 62.6 kg (138 lb)   Height: 1.651 m (5' 5\")      Body mass index is 22.96 kg/m².                  Allergies   Allergen Reactions    Amitriptyline      Couldn't tolerate it     Amoxicillin Hives    Nsaids Other (See Comments)     STROKE/NO NSAIDS ALLOWED    Pcn [Penicillins] Rash     Prior to Visit Medications    Medication Sig Taking? Authorizing Provider   vitamin D (VITAMIN D3) 50 MCG (2000 UT) CAPS capsule Take 1 capsule by mouth daily  ProviderNelly MD   escitalopram (LEXAPRO) 10 MG tablet Take 1 tablet by mouth daily  Octavia Liriano DO   levothyroxine (SYNTHROID) 100 MCG tablet Take 1 tablet by mouth daily  Octavia Liriano DO   dicyclomine (BENTYL) 10 MG capsule Take 1 capsule by mouth 4 times daily (before meals and nightly)  Octavia Liriano DO   atorvastatin (LIPITOR) 40 MG tablet Take 1 tablet by mouth daily  Octavia Liriano DO   Multiple Vitamins-Minerals (THERAPEUTIC MULTIVITAMIN-MINERALS) tablet Take 1 tablet by mouth daily  Provider, Historical, MD   Handicap Placard [facility] [place] by Does

## 2025-02-06 DIAGNOSIS — R73.03 PREDIABETES: ICD-10-CM

## 2025-02-06 DIAGNOSIS — E03.9 HYPOTHYROIDISM, UNSPECIFIED TYPE: ICD-10-CM

## 2025-02-06 DIAGNOSIS — E78.2 MIXED HYPERLIPIDEMIA: ICD-10-CM

## 2025-02-06 LAB
ALBUMIN SERPL-MCNC: 4.8 G/DL (ref 3.5–4.6)
ALP SERPL-CCNC: 71 U/L (ref 40–130)
ALT SERPL-CCNC: 22 U/L (ref 0–33)
ANION GAP SERPL CALCULATED.3IONS-SCNC: 10 MEQ/L (ref 9–15)
AST SERPL-CCNC: 26 U/L (ref 0–35)
BASOPHILS # BLD: 0 K/UL (ref 0–0.2)
BASOPHILS NFR BLD: 0.6 %
BILIRUB SERPL-MCNC: 0.5 MG/DL (ref 0.2–0.7)
BUN SERPL-MCNC: 12 MG/DL (ref 6–20)
CALCIUM SERPL-MCNC: 9.6 MG/DL (ref 8.5–9.9)
CHLORIDE SERPL-SCNC: 103 MEQ/L (ref 95–107)
CHOLEST SERPL-MCNC: 187 MG/DL (ref 0–199)
CO2 SERPL-SCNC: 29 MEQ/L (ref 20–31)
CREAT SERPL-MCNC: 0.66 MG/DL (ref 0.5–0.9)
EOSINOPHIL # BLD: 0.1 K/UL (ref 0–0.7)
EOSINOPHIL NFR BLD: 0.9 %
ERYTHROCYTE [DISTWIDTH] IN BLOOD BY AUTOMATED COUNT: 12.6 % (ref 11.5–14.5)
GLOBULIN SER CALC-MCNC: 2.8 G/DL (ref 2.3–3.5)
GLUCOSE FASTING: 90 MG/DL (ref 70–99)
HCT VFR BLD AUTO: 41.2 % (ref 37–47)
HDLC SERPL-MCNC: 112 MG/DL (ref 40–59)
HGB BLD-MCNC: 13.6 G/DL (ref 12–16)
LDL CHOLESTEROL: 62 MG/DL (ref 0–129)
LYMPHOCYTES # BLD: 2.4 K/UL (ref 1–4.8)
LYMPHOCYTES NFR BLD: 37.3 %
MCH RBC QN AUTO: 30.2 PG (ref 27–31.3)
MCHC RBC AUTO-ENTMCNC: 33 % (ref 33–37)
MCV RBC AUTO: 91.4 FL (ref 79.4–94.8)
MONOCYTES # BLD: 0.4 K/UL (ref 0.2–0.8)
MONOCYTES NFR BLD: 5.5 %
NEUTROPHILS # BLD: 3.6 K/UL (ref 1.4–6.5)
NEUTS SEG NFR BLD: 55.2 %
PLATELET # BLD AUTO: 279 K/UL (ref 130–400)
POTASSIUM SERPL-SCNC: 4 MEQ/L (ref 3.4–4.9)
PROT SERPL-MCNC: 7.6 G/DL (ref 6.3–8)
RBC # BLD AUTO: 4.51 M/UL (ref 4.2–5.4)
SODIUM SERPL-SCNC: 142 MEQ/L (ref 135–144)
TRIGLYCERIDE, FASTING: 64 MG/DL (ref 0–150)
TSH REFLEX: 0.68 UIU/ML (ref 0.44–3.86)
WBC # BLD AUTO: 6.6 K/UL (ref 4.8–10.8)

## 2025-02-07 LAB
ESTIMATED AVERAGE GLUCOSE: 105 MG/DL
HBA1C MFR BLD: 5.3 % (ref 4–6)

## 2025-02-11 NOTE — RESULT ENCOUNTER NOTE
Please inform patient that lab work is stable.  No changes to medications needed.  Follow-up as scheduled.  Thanks

## 2025-02-21 ENCOUNTER — TELEMEDICINE (OUTPATIENT)
Age: 55
End: 2025-02-21

## 2025-02-21 DIAGNOSIS — F32.0 CURRENT MILD EPISODE OF MAJOR DEPRESSIVE DISORDER, UNSPECIFIED WHETHER RECURRENT: ICD-10-CM

## 2025-02-21 DIAGNOSIS — N95.8 GENITOURINARY SYNDROME OF MENOPAUSE: ICD-10-CM

## 2025-02-21 DIAGNOSIS — B08.1 MOLLUSCUM CONTAGIOSUM: ICD-10-CM

## 2025-02-21 DIAGNOSIS — F41.9 ANXIETY: Primary | ICD-10-CM

## 2025-02-21 RX ORDER — ESTRADIOL 0.1 MG/G
1 CREAM VAGINAL SEE ADMIN INSTRUCTIONS
Qty: 42.5 G | Refills: 3 | Status: SHIPPED | OUTPATIENT
Start: 2025-02-21

## 2025-02-21 RX ORDER — ESCITALOPRAM OXALATE 10 MG/1
15 TABLET ORAL DAILY
Qty: 135 TABLET | Refills: 1 | Status: SHIPPED | OUTPATIENT
Start: 2025-02-21 | End: 2025-05-22

## 2025-02-21 SDOH — ECONOMIC STABILITY: FOOD INSECURITY: WITHIN THE PAST 12 MONTHS, THE FOOD YOU BOUGHT JUST DIDN'T LAST AND YOU DIDN'T HAVE MONEY TO GET MORE.: NEVER TRUE

## 2025-02-21 SDOH — ECONOMIC STABILITY: FOOD INSECURITY: WITHIN THE PAST 12 MONTHS, YOU WORRIED THAT YOUR FOOD WOULD RUN OUT BEFORE YOU GOT MONEY TO BUY MORE.: NEVER TRUE

## 2025-02-21 NOTE — PROGRESS NOTES
2/21/2025    TELEHEALTH EVALUATION -- Audio/Visual    Chief Complaint   Patient presents with    Anxiety     Has been struggling with an increase in anxiety. Would like to discuss increasing Lexapro dose.     Skin Problem     States she recently was treated by dermatologist & was diagnosed with Molluscum Contagiosum. States she has some questions & concerns about this & would like to discuss.        HPI:  History of Present Illness  The patient is a 54-year-old female who presents via virtual visit for evaluation of anxiety, molluscum, and dyspareunia.    She reports an escalation in her anxiety levels, which she attributes to the ongoing health issues of her mother and daughter. She expresses a desire to increase her medication dosage of Lexapro to 15 mg daily.    She has been diagnosed with molluscum on her inner thigh, confirmed through biopsy. The dermatologist suggested that the condition might be sexually transmitted, causing significant stress. Her , however, does not exhibit any similar symptoms. She also mentions that her daughter had a similar condition, diagnosed as skin tags due to leg rubbing, which resolved spontaneously about 6 months ago. She speculates that she might have contracted the molluscum from her daughter or grandchildren.    She experiences pain during intercourse and is interested in exploring treatment options. She notes that her , aged 56, consumes alcohol and does not ejaculate during intercourse, which exacerbates her discomfort. She recalls receiving a sample of vaginal estrogen cream from her gynecologist but did not use it consistently due to the requirement of nightly application.  She has no other concerns at this time.        PHYSICAL EXAMINATION:  Due to nature of virtual visit, unable to complete full physical exam at this time, patient does not sound short of breath while talking.  Does not appear to be in respiratory distress.    ASSESSMENT/PLAN:  Assessment &

## 2025-03-11 DIAGNOSIS — M21.619 BUNION: Primary | ICD-10-CM

## 2025-03-28 ENCOUNTER — TRANSCRIBE ORDERS (OUTPATIENT)
Dept: ADMINISTRATIVE | Age: 55
End: 2025-03-28

## 2025-03-28 DIAGNOSIS — Z81.8 FHX: DEMENTIA: ICD-10-CM

## 2025-03-28 DIAGNOSIS — G31.84 MCI (MILD COGNITIVE IMPAIRMENT) WITH MEMORY LOSS: ICD-10-CM

## 2025-03-28 DIAGNOSIS — Z86.73 HISTORY OF STROKE: Primary | ICD-10-CM

## 2025-04-07 ENCOUNTER — OFFICE VISIT (OUTPATIENT)
Age: 55
End: 2025-04-07
Payer: COMMERCIAL

## 2025-04-07 VITALS
OXYGEN SATURATION: 98 % | SYSTOLIC BLOOD PRESSURE: 110 MMHG | BODY MASS INDEX: 23.32 KG/M2 | TEMPERATURE: 98 F | HEIGHT: 65 IN | HEART RATE: 60 BPM | WEIGHT: 140 LBS | DIASTOLIC BLOOD PRESSURE: 70 MMHG

## 2025-04-07 DIAGNOSIS — R73.03 PREDIABETES: ICD-10-CM

## 2025-04-07 DIAGNOSIS — F41.9 ANXIETY: ICD-10-CM

## 2025-04-07 DIAGNOSIS — N95.8 GENITOURINARY SYNDROME OF MENOPAUSE: Primary | ICD-10-CM

## 2025-04-07 DIAGNOSIS — K58.0 IRRITABLE BOWEL SYNDROME WITH DIARRHEA: ICD-10-CM

## 2025-04-07 DIAGNOSIS — M79.7 FIBROMYALGIA: ICD-10-CM

## 2025-04-07 DIAGNOSIS — F32.0 CURRENT MILD EPISODE OF MAJOR DEPRESSIVE DISORDER, UNSPECIFIED WHETHER RECURRENT: ICD-10-CM

## 2025-04-07 PROCEDURE — G2211 COMPLEX E/M VISIT ADD ON: HCPCS | Performed by: STUDENT IN AN ORGANIZED HEALTH CARE EDUCATION/TRAINING PROGRAM

## 2025-04-07 PROCEDURE — 3017F COLORECTAL CA SCREEN DOC REV: CPT | Performed by: STUDENT IN AN ORGANIZED HEALTH CARE EDUCATION/TRAINING PROGRAM

## 2025-04-07 PROCEDURE — G8420 CALC BMI NORM PARAMETERS: HCPCS | Performed by: STUDENT IN AN ORGANIZED HEALTH CARE EDUCATION/TRAINING PROGRAM

## 2025-04-07 PROCEDURE — G8427 DOCREV CUR MEDS BY ELIG CLIN: HCPCS | Performed by: STUDENT IN AN ORGANIZED HEALTH CARE EDUCATION/TRAINING PROGRAM

## 2025-04-07 PROCEDURE — 1036F TOBACCO NON-USER: CPT | Performed by: STUDENT IN AN ORGANIZED HEALTH CARE EDUCATION/TRAINING PROGRAM

## 2025-04-07 PROCEDURE — 99214 OFFICE O/P EST MOD 30 MIN: CPT | Performed by: STUDENT IN AN ORGANIZED HEALTH CARE EDUCATION/TRAINING PROGRAM

## 2025-04-07 RX ORDER — ESTRADIOL 2 MG/1
1 RING VAGINAL ONCE
Qty: 1 EACH | Refills: 1 | Status: SHIPPED | OUTPATIENT
Start: 2025-04-07 | End: 2025-04-07

## 2025-04-07 RX ORDER — ESCITALOPRAM OXALATE 20 MG/1
20 TABLET ORAL DAILY
Qty: 90 TABLET | Refills: 1 | Status: SHIPPED | OUTPATIENT
Start: 2025-04-07

## 2025-04-07 NOTE — PROGRESS NOTES
Subjective  Shawna Stanley, 55 y.o. female presents today with:  Chief Complaint   Patient presents with    Follow-up    Depression     Feels mood is stable. Denies feeling down & depressed.     Anxiety     Continues to struggle with feeling anxious. Feels like she is even having little anxiety attacks.     Pre-Diabetes     A1c was 5.3 on 2/6/25.     Hypothyroidism     Labs done 2/6/25.     Hyperlipidemia     Lipid panel done 2/6/25.       Dyspareunia     Is not using estradiol cream.     Fatigue     Has been very fatigued & would like to discuss.     Diarrhea     States she has been having a lot of stomach upset & diarrhea. States anytime she eats it goes right through her. Is also having a lot of belching & gas.        History of Present Illness  The patient is a 55-year-old female who presents for follow-up.    She reports experiencing sleep disturbances, which she attributes to her current responsibilities of caring for her grandchild four days a week and the recent health scare involving her mother. Her mother had a collapsed lung in December 2024, and a subsequent biopsy initially indicated cancer, which was later determined to be a low-grade tumor requiring surgical removal.     She has been on Lexapro 15 mg, which she believes has been beneficial without significant side effects. However, intermittent anxiety and panic attacks persist, necessitating constant hydration as a coping mechanism. An increase in her Lexapro dosage to 20 mg is being considered.    Persistent thirst is reported, despite previous tests showing normal results. She estimates her daily water intake to be around six to eight glasses.    A history of gastrointestinal issues is noted, which she suspects may be exacerbated by stress and anxiety. She does not currently take any fiber supplements. She underwent a colonoscopy last year and was prescribed Bentyl, which she takes four times daily before meals and at bedtime.    She has a

## 2025-04-24 ENCOUNTER — ANESTHESIA (OUTPATIENT)
Dept: MRI IMAGING | Age: 55
End: 2025-04-24
Payer: COMMERCIAL

## 2025-04-24 ENCOUNTER — HOSPITAL ENCOUNTER (OUTPATIENT)
Dept: MRI IMAGING | Age: 55
Discharge: HOME | End: 2025-04-26
Attending: PSYCHIATRY & NEUROLOGY
Payer: COMMERCIAL

## 2025-04-24 ENCOUNTER — ANESTHESIA EVENT (OUTPATIENT)
Dept: MRI IMAGING | Age: 55
End: 2025-04-24
Payer: COMMERCIAL

## 2025-04-24 VITALS
HEIGHT: 65 IN | TEMPERATURE: 96.9 F | HEART RATE: 54 BPM | BODY MASS INDEX: 23.32 KG/M2 | RESPIRATION RATE: 18 BRPM | DIASTOLIC BLOOD PRESSURE: 62 MMHG | WEIGHT: 140 LBS | SYSTOLIC BLOOD PRESSURE: 130 MMHG | OXYGEN SATURATION: 99 %

## 2025-04-24 DIAGNOSIS — Z81.8 FHX: DEMENTIA: ICD-10-CM

## 2025-04-24 DIAGNOSIS — Z86.73 HISTORY OF STROKE: ICD-10-CM

## 2025-04-24 DIAGNOSIS — G31.84 MCI (MILD COGNITIVE IMPAIRMENT) WITH MEMORY LOSS: ICD-10-CM

## 2025-04-24 PROCEDURE — 70553 MRI BRAIN STEM W/O & W/DYE: CPT

## 2025-04-24 PROCEDURE — 2580000003 HC RX 258: Performed by: ANESTHESIOLOGY

## 2025-04-24 PROCEDURE — 6360000002 HC RX W HCPCS: Performed by: NURSE ANESTHETIST, CERTIFIED REGISTERED

## 2025-04-24 PROCEDURE — 6360000004 HC RX CONTRAST MEDICATION: Performed by: PSYCHIATRY & NEUROLOGY

## 2025-04-24 PROCEDURE — A9579 GAD-BASE MR CONTRAST NOS,1ML: HCPCS | Performed by: PSYCHIATRY & NEUROLOGY

## 2025-04-24 RX ORDER — LIDOCAINE HYDROCHLORIDE 20 MG/ML
INJECTION, SOLUTION INFILTRATION; PERINEURAL
Status: DISCONTINUED | OUTPATIENT
Start: 2025-04-24 | End: 2025-04-24 | Stop reason: SDUPTHER

## 2025-04-24 RX ORDER — SODIUM CHLORIDE 9 MG/ML
INJECTION, SOLUTION INTRAVENOUS CONTINUOUS
Status: DISCONTINUED | OUTPATIENT
Start: 2025-04-24 | End: 2025-04-27 | Stop reason: HOSPADM

## 2025-04-24 RX ORDER — PROPOFOL 10 MG/ML
INJECTION, EMULSION INTRAVENOUS
Status: DISCONTINUED | OUTPATIENT
Start: 2025-04-24 | End: 2025-04-24 | Stop reason: SDUPTHER

## 2025-04-24 RX ADMIN — LIDOCAINE HYDROCHLORIDE 60 MG: 20 INJECTION, SOLUTION INFILTRATION; PERINEURAL at 13:13

## 2025-04-24 RX ADMIN — PROPOFOL 100 MG: 10 INJECTION, EMULSION INTRAVENOUS at 13:13

## 2025-04-24 RX ADMIN — PROPOFOL 150 MCG/KG/MIN: 10 INJECTION, EMULSION INTRAVENOUS at 13:14

## 2025-04-24 RX ADMIN — GADOTERIDOL 15 ML: 279.3 INJECTION, SOLUTION INTRAVENOUS at 14:06

## 2025-04-24 RX ADMIN — SODIUM CHLORIDE: 0.9 INJECTION, SOLUTION INTRAVENOUS at 12:46

## 2025-04-24 ASSESSMENT — PAIN SCALES - GENERAL
PAINLEVEL_OUTOF10: 0
PAINLEVEL_OUTOF10: 0

## 2025-04-24 NOTE — ANESTHESIA POSTPROCEDURE EVALUATION
Department of Anesthesiology  Postprocedure Note    Patient: Shawna Stanley  MRN: [id number]  YOB: 1970  Date of evaluation: 4/24/2025    Procedure Summary       Date: 04/24/25 Room / Location: [facility]    Anesthesia Start: 1305 Anesthesia Stop: 1350    Procedure: MRI BRAIN W WO CONTRAST Diagnosis:       History of stroke      FHx: dementia      MCI (mild cognitive impairment) with memory loss    Scheduled Providers:  Responsible Provider: Maylin Ledbetter MD    Anesthesia Type: MAC ASA Status: 3            Anesthesia Type: No value filed.    Christophe Phase I: Christophe Score: 10    Christophe Phase II:      Anesthesia Post Evaluation    Patient location during evaluation: bedside  Patient participation: complete - patient participated  Level of consciousness: awake and awake and alert  Pain score: 0  Airway patency: patent  Nausea & Vomiting: no nausea and no vomiting  Cardiovascular status: blood pressure returned to baseline and hemodynamically stable  Respiratory status: acceptable  Hydration status: euvolemic  Pain management: adequate        No notable events documented.

## 2025-04-24 NOTE — ANESTHESIA PRE PROCEDURE
Department of Anesthesiology  Preprocedure Note       Name:  Shawna Stanley   Age:  55 y.o.  :  1970                                          MRN:  [id number]         Date:  2025      Surgeon: * No surgeons listed *    Procedure: * No procedures listed *    Medications prior to admission:   Prior to Admission medications    Medication Sig Start Date End Date Taking? Authorizing Provider   escitalopram (LEXAPRO) 20 MG tablet Take 1 tablet by mouth daily 25  Yes Octavia Liriano DO   vitamin D (VITAMIN D3) 50 MCG (2000 UT) CAPS capsule Take 1 capsule by mouth daily   Yes Nelly Marquez MD   levothyroxine (SYNTHROID) 100 MCG tablet Take 1 tablet by mouth daily 24  Yes Octavia Liriano DO   dicyclomine (BENTYL) 10 MG capsule Take 1 capsule by mouth 4 times daily (before meals and nightly) 24  Yes Octavia Liriano DO   atorvastatin (LIPITOR) 40 MG tablet Take 1 tablet by mouth daily 24 Yes Octavia Liriano DO   Multiple Vitamins-Minerals (THERAPEUTIC MULTIVITAMIN-MINERALS) tablet Take 1 tablet by mouth daily   Yes ProviderNelly MD   acetaminophen (TYLENOL) 500 MG tablet Take 1 tablet by mouth every 6 hours as needed for Pain   Yes ProviderNelly MD   lidocaine (LMX) 4 % cream Apply a half dollar sized amount to intact skin topically up to twice daily as needed for pain 22  Yes Kenyon Curtis MD   fluticasone (FLONASE) 50 MCG/ACT nasal spray 1 spray by Each Nostril route daily as needed   Yes ProviderNelly MD   aspirin 81 MG tablet Take 1 tablet by mouth Takes 2 tablets daily   Yes Nelly Marquez MD   Estradiol (ESTRING) 7.5 MCG/24HR RING Place 1 each vaginally once for 1 dose Replace every 3 months 25  Octavia Liriano DO   Handicap Placard MISC by Does not apply route For medical condition lasting longer than 5 years.  Start 2023  End 2028   Octavia Liriano DO   nitrofurantoin (MACRODANTIN) 50 MG capsule

## 2025-04-24 NOTE — ANESTHESIA PRE PROCEDURE
Department of Anesthesiology  Preprocedure Note       Name:  Shawna Stanley   Age:  55 y.o.  :  1970                                          MRN:  [id number]         Date:  2025      Surgeon: * No surgeons listed *    Procedure: * No procedures listed *    Medications prior to admission:   Prior to Admission medications    Medication Sig Start Date End Date Taking? Authorizing Provider   escitalopram (LEXAPRO) 20 MG tablet Take 1 tablet by mouth daily 25  Yes Octavia Liriano DO   vitamin D (VITAMIN D3) 50 MCG (2000 UT) CAPS capsule Take 1 capsule by mouth daily   Yes Nelly Marquez MD   levothyroxine (SYNTHROID) 100 MCG tablet Take 1 tablet by mouth daily 24  Yes Octavia Liriano DO   dicyclomine (BENTYL) 10 MG capsule Take 1 capsule by mouth 4 times daily (before meals and nightly) 24  Yes Octavia Liriano DO   atorvastatin (LIPITOR) 40 MG tablet Take 1 tablet by mouth daily 24 Yes Octavia Liriano DO   Multiple Vitamins-Minerals (THERAPEUTIC MULTIVITAMIN-MINERALS) tablet Take 1 tablet by mouth daily   Yes ProviderNelly MD   acetaminophen (TYLENOL) 500 MG tablet Take 1 tablet by mouth every 6 hours as needed for Pain   Yes ProviderNelly MD   lidocaine (LMX) 4 % cream Apply a half dollar sized amount to intact skin topically up to twice daily as needed for pain 22  Yes Kenyon Curtis MD   fluticasone (FLONASE) 50 MCG/ACT nasal spray 1 spray by Each Nostril route daily as needed   Yes ProviderNelly MD   aspirin 81 MG tablet Take 1 tablet by mouth Takes 2 tablets daily   Yes Nelly Marquez MD   Estradiol (ESTRING) 7.5 MCG/24HR RING Place 1 each vaginally once for 1 dose Replace every 3 months 25  Octavia Liriano DO   Handicap Placard MISC by Does not apply route For medical condition lasting longer than 5 years.  Start 2023  End 2028   Octavia Liriano DO   nitrofurantoin (MACRODANTIN) 50 MG capsule

## 2025-05-12 DIAGNOSIS — E03.9 HYPOTHYROIDISM, UNSPECIFIED TYPE: ICD-10-CM

## 2025-05-13 RX ORDER — LEVOTHYROXINE SODIUM 100 UG/1
100 TABLET ORAL DAILY
Qty: 90 TABLET | Refills: 2 | Status: SHIPPED | OUTPATIENT
Start: 2025-05-13

## 2025-05-19 ENCOUNTER — OFFICE VISIT (OUTPATIENT)
Age: 55
End: 2025-05-19
Payer: COMMERCIAL

## 2025-05-19 VITALS
DIASTOLIC BLOOD PRESSURE: 72 MMHG | WEIGHT: 139 LBS | HEART RATE: 63 BPM | BODY MASS INDEX: 23.16 KG/M2 | HEIGHT: 65 IN | TEMPERATURE: 97.6 F | SYSTOLIC BLOOD PRESSURE: 124 MMHG | OXYGEN SATURATION: 100 %

## 2025-05-19 DIAGNOSIS — N95.8 GENITOURINARY SYNDROME OF MENOPAUSE: Primary | ICD-10-CM

## 2025-05-19 DIAGNOSIS — E78.2 MIXED HYPERLIPIDEMIA: ICD-10-CM

## 2025-05-19 DIAGNOSIS — J34.1 RETENTION CYST OF PARANASAL SINUS: ICD-10-CM

## 2025-05-19 DIAGNOSIS — F41.9 ANXIETY: ICD-10-CM

## 2025-05-19 DIAGNOSIS — K58.0 IRRITABLE BOWEL SYNDROME WITH DIARRHEA: ICD-10-CM

## 2025-05-19 DIAGNOSIS — F32.0 CURRENT MILD EPISODE OF MAJOR DEPRESSIVE DISORDER, UNSPECIFIED WHETHER RECURRENT: ICD-10-CM

## 2025-05-19 PROCEDURE — G8420 CALC BMI NORM PARAMETERS: HCPCS | Performed by: STUDENT IN AN ORGANIZED HEALTH CARE EDUCATION/TRAINING PROGRAM

## 2025-05-19 PROCEDURE — 3017F COLORECTAL CA SCREEN DOC REV: CPT | Performed by: STUDENT IN AN ORGANIZED HEALTH CARE EDUCATION/TRAINING PROGRAM

## 2025-05-19 PROCEDURE — G8427 DOCREV CUR MEDS BY ELIG CLIN: HCPCS | Performed by: STUDENT IN AN ORGANIZED HEALTH CARE EDUCATION/TRAINING PROGRAM

## 2025-05-19 PROCEDURE — 1036F TOBACCO NON-USER: CPT | Performed by: STUDENT IN AN ORGANIZED HEALTH CARE EDUCATION/TRAINING PROGRAM

## 2025-05-19 PROCEDURE — 99214 OFFICE O/P EST MOD 30 MIN: CPT | Performed by: STUDENT IN AN ORGANIZED HEALTH CARE EDUCATION/TRAINING PROGRAM

## 2025-05-19 PROCEDURE — G2211 COMPLEX E/M VISIT ADD ON: HCPCS | Performed by: STUDENT IN AN ORGANIZED HEALTH CARE EDUCATION/TRAINING PROGRAM

## 2025-05-19 RX ORDER — ESTRADIOL 2 MG/1
1 RING VAGINAL ONCE
Qty: 1 EACH | Refills: 1 | Status: SHIPPED | OUTPATIENT
Start: 2025-05-19 | End: 2025-05-19

## 2025-05-19 NOTE — PROGRESS NOTES
Subjective  Shawna Stanley, 55 y.o. female presents today with:  Chief Complaint   Patient presents with    Follow-up    Anxiety     Feels this has improved.     Insomnia     Continues to have trouble staying asleep.     Diarrhea     Remains the same.     Polydipsia     Remains the same.     GSM     Was unable to get Estradiol ring, cost was over $200 even with [facility].        History of Present Illness  The patient is a 55-year-old female who presents for follow-up.    She reports significant sinus discomfort, characterized by postnasal drip, frequent throat clearing, and coughing. She has resumed using Flonase and takes a generic version of Allegra nightly. 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 Additionally, she experiences ocular issues, which she attributes to allergies, and plans to use wipes for relief. She is currently experiencing a sinus headache. She would be interested in seeing ENT.     She continues to experience diarrhea, which she believes may be triggered by certain foods, such as [place] steak. She describes her stools as oily during these episodes. She has not yet consulted a gastroenterologist due to other ongoing health issues. She has monitored her water intake once, consuming seven 16-ounce bottles in a day.    She reports poor sleep quality and ongoing stress related to her mother's health condition and family issues.  She is currently on Lexapro 20 mg daily.  No other concerns at this time.    FAMILY HISTORY  Her mother has lung cancer, which appears to be a slow-growing carcinoid tumor.      Past Medical History:   Diagnosis Date    Abnormal Pap smear of cervix     Allergic rhinitis     Cerebrovascular disease 2016    Chronic back pain 9/22/2019    Depression     Depression with anxiety     Depression with anxiety     Fibromyalgia 2019?    GERD (gastroesophageal reflux disease)

## 2025-08-28 DIAGNOSIS — E78.2 MIXED HYPERLIPIDEMIA: ICD-10-CM

## 2025-08-28 RX ORDER — ATORVASTATIN CALCIUM 40 MG/1
40 TABLET, FILM COATED ORAL DAILY
Qty: 90 TABLET | Refills: 0 | Status: SHIPPED | OUTPATIENT
Start: 2025-08-28 | End: 2025-11-26

## (undated) DEVICE — NEPTUNE E-SEP SMOKE EVACUATION PENCIL, COATED, 70MM BLADE, PUSH BUTTON SWITCH: Brand: NEPTUNE E-SEP

## (undated) DEVICE — SNARE SURG 9 MM 2.4 MMX230 CM EXACTO CLD

## (undated) DEVICE — TUBE SET 96 MM 64 MM H2O PERISTALTIC STD AUX CHANNEL

## (undated) DEVICE — BLADE ES ELASTOMERIC COAT INSUL DURABLE BEND UPTO 90DEG

## (undated) DEVICE — Device: Brand: ENDO SMARTCAP

## (undated) DEVICE — BRUSH ENDO CLN L90.5IN SHTH DIA1.7MM BRIST DIA5-7MM 2-6MM

## (undated) DEVICE — BLADE,CARBON-STEEL,15,STRL,DISPOSABLE,TB: Brand: MEDLINE

## (undated) DEVICE — NEEDLE HYPO 25GA L1.5IN BLU POLYPR HUB S STL REG BVL STR

## (undated) DEVICE — PACK,EENT,TURBAN DRAPE,PK II: Brand: MEDLINE

## (undated) DEVICE — GLOVE ORANGE PI 8   MSG9080

## (undated) DEVICE — TUBING, SUCTION, 1/4" X 10', STRAIGHT: Brand: MEDLINE

## (undated) DEVICE — TOWEL,OR,DSP,ST,BLUE,STD,4/PK,20PK/CS: Brand: MEDLINE

## (undated) DEVICE — COUNTER NDL 40 COUNT HLD 70 FOAM BLK ADH W/ MAG

## (undated) DEVICE — GOWN,AURORA,NONREINFORCED,LARGE: Brand: MEDLINE

## (undated) DEVICE — COVER LT HNDL BLU PLAS

## (undated) DEVICE — TUBING, SUCTION, 9/32" X 12', STRAIGHT: Brand: MEDLINE INDUSTRIES, INC.

## (undated) DEVICE — CONMED SCOPE SAVER BITE BLOCK, 20X27 MM: Brand: SCOPE SAVER

## (undated) DEVICE — SUTURE VCRL SZ 3-0 L27IN ABSRB UD L26MM SH 1/2 CIR J416H

## (undated) DEVICE — GAUZE 2X2IN ST BORDERED

## (undated) DEVICE — GAUZE,SPONGE,4"X4",16PLY,XRAY,STRL,LF: Brand: MEDLINE

## (undated) DEVICE — SUTURE VCRL SZ 4-0 L18IN ABSRB UD L19MM PS-2 3/8 CIR PRIM J496H

## (undated) DEVICE — APPLICATOR MEDICATED 26 CC SOLUTION HI LT ORNG CHLORAPREP

## (undated) DEVICE — LABEL MED MINI W/ MARKER

## (undated) DEVICE — SINGLE PORT MANIFOLD: Brand: NEPTUNE 2

## (undated) DEVICE — GOWN,SIRUS,NONRNF,SETINSLV,2XL,18/CS: Brand: MEDLINE

## (undated) DEVICE — SYRINGE MED 10ML LUERLOCK TIP W/O SFTY DISP

## (undated) DEVICE — ENDOSCOPIC TRAY TRNSPRT 20.5X16.5X4.1 IN RECYCL SUGAR PULP

## (undated) DEVICE — ADAPTER FLSH PMP FLD MGMT GI IRRIG OFP 2 DISPOSABLE

## (undated) DEVICE — TRAP POLYP BALEEN